# Patient Record
Sex: FEMALE | Race: WHITE | NOT HISPANIC OR LATINO | Employment: OTHER | URBAN - METROPOLITAN AREA
[De-identification: names, ages, dates, MRNs, and addresses within clinical notes are randomized per-mention and may not be internally consistent; named-entity substitution may affect disease eponyms.]

---

## 2018-01-01 ENCOUNTER — HOSPITAL ENCOUNTER (INPATIENT)
Facility: HOSPITAL | Age: 78
LOS: 9 days | DRG: 208 | End: 2018-08-27
Attending: EMERGENCY MEDICINE | Admitting: INTERNAL MEDICINE
Payer: MEDICARE

## 2018-01-01 ENCOUNTER — APPOINTMENT (INPATIENT)
Dept: RADIOLOGY | Facility: HOSPITAL | Age: 78
DRG: 208 | End: 2018-01-01
Payer: MEDICARE

## 2018-01-01 ENCOUNTER — APPOINTMENT (INPATIENT)
Dept: CT IMAGING | Facility: HOSPITAL | Age: 78
DRG: 208 | End: 2018-01-01
Payer: MEDICARE

## 2018-01-01 ENCOUNTER — APPOINTMENT (INPATIENT)
Dept: ULTRASOUND IMAGING | Facility: HOSPITAL | Age: 78
DRG: 208 | End: 2018-01-01
Payer: MEDICARE

## 2018-01-01 ENCOUNTER — APPOINTMENT (EMERGENCY)
Dept: RADIOLOGY | Facility: HOSPITAL | Age: 78
DRG: 208 | End: 2018-01-01
Payer: MEDICARE

## 2018-01-01 ENCOUNTER — APPOINTMENT (INPATIENT)
Dept: NON INVASIVE DIAGNOSTICS | Facility: HOSPITAL | Age: 78
DRG: 208 | End: 2018-01-01
Payer: MEDICARE

## 2018-01-01 VITALS
DIASTOLIC BLOOD PRESSURE: 54 MMHG | OXYGEN SATURATION: 96 % | SYSTOLIC BLOOD PRESSURE: 112 MMHG | RESPIRATION RATE: 24 BRPM | BODY MASS INDEX: 27.59 KG/M2 | TEMPERATURE: 96.4 F | HEART RATE: 81 BPM | WEIGHT: 161.6 LBS | HEIGHT: 64 IN

## 2018-01-01 DIAGNOSIS — R09.02 HYPOXIA: ICD-10-CM

## 2018-01-01 DIAGNOSIS — N17.9 ACUTE RENAL FAILURE (ARF) (HCC): Primary | ICD-10-CM

## 2018-01-01 DIAGNOSIS — I50.43 ACUTE ON CHRONIC COMBINED SYSTOLIC (CONGESTIVE) AND DIASTOLIC (CONGESTIVE) HEART FAILURE (HCC): ICD-10-CM

## 2018-01-01 DIAGNOSIS — R41.82 ALTERED MENTAL STATUS: ICD-10-CM

## 2018-01-01 DIAGNOSIS — J96.01 ACUTE RESPIRATORY FAILURE WITH HYPOXIA (HCC): ICD-10-CM

## 2018-01-01 DIAGNOSIS — R77.8 ELEVATED TROPONIN I LEVEL: ICD-10-CM

## 2018-01-01 DIAGNOSIS — D68.9 COAGULOPATHY (HCC): ICD-10-CM

## 2018-01-01 DIAGNOSIS — J90 PLEURAL EFFUSION: ICD-10-CM

## 2018-01-01 DIAGNOSIS — R79.1 SUPRATHERAPEUTIC INR: ICD-10-CM

## 2018-01-01 DIAGNOSIS — G93.40 ENCEPHALOPATHY: ICD-10-CM

## 2018-01-01 DIAGNOSIS — R79.89 ABNORMAL LFTS: ICD-10-CM

## 2018-01-01 DIAGNOSIS — N17.9 AKI (ACUTE KIDNEY INJURY) (HCC): ICD-10-CM

## 2018-01-01 LAB
A1AT SERPL-MCNC: 207 MG/DL (ref 90–200)
ABO GROUP BLD BPU: NORMAL
ABO GROUP BLD: NORMAL
ACANTHOCYTES BLD QL SMEAR: PRESENT
ACTIN IGG SERPL-ACNC: 8 UNITS (ref 0–19)
ALBUMIN SERPL BCP-MCNC: 2.2 G/DL (ref 3.5–5)
ALBUMIN SERPL BCP-MCNC: 2.3 G/DL (ref 3.5–5)
ALBUMIN SERPL BCP-MCNC: 2.3 G/DL (ref 3.5–5)
ALBUMIN SERPL BCP-MCNC: 2.4 G/DL (ref 3.5–5)
ALBUMIN SERPL BCP-MCNC: 2.5 G/DL (ref 3.5–5)
ALBUMIN SERPL BCP-MCNC: 2.7 G/DL (ref 3.5–5)
ALBUMIN SERPL BCP-MCNC: 2.7 G/DL (ref 3.5–5)
ALP SERPL-CCNC: 145 U/L (ref 46–116)
ALP SERPL-CCNC: 187 U/L (ref 46–116)
ALP SERPL-CCNC: 194 U/L (ref 46–116)
ALP SERPL-CCNC: 197 U/L (ref 46–116)
ALP SERPL-CCNC: 215 U/L (ref 46–116)
ALP SERPL-CCNC: 216 U/L (ref 46–116)
ALP SERPL-CCNC: 221 U/L (ref 46–116)
ALP SERPL-CCNC: 223 U/L (ref 46–116)
ALP SERPL-CCNC: 238 U/L (ref 46–116)
ALT SERPL W P-5'-P-CCNC: 106 U/L (ref 12–78)
ALT SERPL W P-5'-P-CCNC: 111 U/L (ref 12–78)
ALT SERPL W P-5'-P-CCNC: 127 U/L (ref 12–78)
ALT SERPL W P-5'-P-CCNC: 131 U/L (ref 12–78)
ALT SERPL W P-5'-P-CCNC: 161 U/L (ref 12–78)
ALT SERPL W P-5'-P-CCNC: 167 U/L (ref 12–78)
ALT SERPL W P-5'-P-CCNC: 208 U/L (ref 12–78)
ALT SERPL W P-5'-P-CCNC: 413 U/L (ref 12–78)
ALT SERPL W P-5'-P-CCNC: 429 U/L (ref 12–78)
AMMONIA PLAS-SCNC: 11 UMOL/L (ref 11–35)
AMMONIA PLAS-SCNC: 34 UMOL/L (ref 11–35)
ANION GAP BLD CALC-SCNC: 17 MMOL/L (ref 4–13)
ANION GAP SERPL CALCULATED.3IONS-SCNC: 10 MMOL/L (ref 4–13)
ANION GAP SERPL CALCULATED.3IONS-SCNC: 10 MMOL/L (ref 4–13)
ANION GAP SERPL CALCULATED.3IONS-SCNC: 11 MMOL/L (ref 4–13)
ANION GAP SERPL CALCULATED.3IONS-SCNC: 12 MMOL/L (ref 4–13)
ANION GAP SERPL CALCULATED.3IONS-SCNC: 12 MMOL/L (ref 4–13)
ANION GAP SERPL CALCULATED.3IONS-SCNC: 13 MMOL/L (ref 4–13)
ANION GAP SERPL CALCULATED.3IONS-SCNC: 14 MMOL/L (ref 4–13)
ANION GAP SERPL CALCULATED.3IONS-SCNC: 14 MMOL/L (ref 4–13)
ANION GAP SERPL CALCULATED.3IONS-SCNC: 15 MMOL/L (ref 4–13)
ANION GAP SERPL CALCULATED.3IONS-SCNC: 16 MMOL/L (ref 4–13)
ANION GAP SERPL CALCULATED.3IONS-SCNC: 18 MMOL/L (ref 4–13)
ANION GAP SERPL CALCULATED.3IONS-SCNC: 7 MMOL/L (ref 4–13)
ANION GAP SERPL CALCULATED.3IONS-SCNC: 9 MMOL/L (ref 4–13)
ANISOCYTOSIS BLD QL SMEAR: PRESENT
APTT PPP: 45 SECONDS (ref 24–36)
APTT PPP: 47 SECONDS (ref 24–36)
APTT PPP: 47 SECONDS (ref 24–36)
ARTERIAL PATENCY WRIST A: ABNORMAL
ARTERIAL PATENCY WRIST A: ABNORMAL
AST SERPL W P-5'-P-CCNC: 22 U/L (ref 5–45)
AST SERPL W P-5'-P-CCNC: 23 U/L (ref 5–45)
AST SERPL W P-5'-P-CCNC: 265 U/L (ref 5–45)
AST SERPL W P-5'-P-CCNC: 27 U/L (ref 5–45)
AST SERPL W P-5'-P-CCNC: 388 U/L (ref 5–45)
AST SERPL W P-5'-P-CCNC: 40 U/L (ref 5–45)
AST SERPL W P-5'-P-CCNC: 473 U/L (ref 5–45)
AST SERPL W P-5'-P-CCNC: 64 U/L (ref 5–45)
AST SERPL W P-5'-P-CCNC: 72 U/L (ref 5–45)
ATRIAL RATE: 100 BPM
ATRIAL RATE: 119 BPM
ATRIAL RATE: 120 BPM
ATRIAL RATE: 54 BPM
ATRIAL RATE: 58 BPM
ATRIAL RATE: 94 BPM
BACTERIA BLD CULT: NORMAL
BACTERIA UR CULT: ABNORMAL
BACTERIA UR QL AUTO: ABNORMAL /HPF
BACTERIA UR QL AUTO: ABNORMAL /HPF
BASE EX.OXY STD BLDV CALC-SCNC: 74.7 % (ref 60–80)
BASE EXCESS BLDA CALC-SCNC: -15 MMOL/L (ref -2–3)
BASE EXCESS BLDA CALC-SCNC: -4 MMOL/L (ref -2–3)
BASE EXCESS BLDA CALC-SCNC: 0 MMOL/L (ref -2–3)
BASE EXCESS BLDV CALC-SCNC: -13.4 MMOL/L
BASOPHILS # BLD AUTO: 0.01 THOUSANDS/ΜL (ref 0–0.1)
BASOPHILS # BLD MANUAL: 0 THOUSAND/UL (ref 0–0.1)
BASOPHILS NFR BLD AUTO: 0 % (ref 0–1)
BASOPHILS NFR MAR MANUAL: 0 % (ref 0–1)
BILIRUB DIRECT SERPL-MCNC: 0.91 MG/DL (ref 0–0.2)
BILIRUB DIRECT SERPL-MCNC: 1.13 MG/DL (ref 0–0.2)
BILIRUB DIRECT SERPL-MCNC: 1.2 MG/DL (ref 0–0.2)
BILIRUB DIRECT SERPL-MCNC: 1.81 MG/DL (ref 0–0.2)
BILIRUB SERPL-MCNC: 1.2 MG/DL (ref 0.2–1)
BILIRUB SERPL-MCNC: 1.4 MG/DL (ref 0.2–1)
BILIRUB SERPL-MCNC: 1.5 MG/DL (ref 0.2–1)
BILIRUB SERPL-MCNC: 1.6 MG/DL (ref 0.2–1)
BILIRUB SERPL-MCNC: 1.6 MG/DL (ref 0.2–1)
BILIRUB SERPL-MCNC: 1.8 MG/DL (ref 0.2–1)
BILIRUB SERPL-MCNC: 2.4 MG/DL (ref 0.2–1)
BILIRUB SERPL-MCNC: 3.1 MG/DL (ref 0.2–1)
BILIRUB SERPL-MCNC: 3.4 MG/DL (ref 0.2–1)
BILIRUB UR QL STRIP: NEGATIVE
BILIRUB UR QL STRIP: NEGATIVE
BLD GP AB SCN SERPL QL: POSITIVE
BLD SMEAR INTERP: NORMAL
BLOOD GROUP ANTIBODIES SERPL: NORMAL
BLOOD GROUP ANTIBODIES SERPL: NORMAL
BPU ID: NORMAL
BUN BLD-MCNC: 55 MG/DL (ref 5–25)
BUN SERPL-MCNC: 41 MG/DL (ref 5–25)
BUN SERPL-MCNC: 42 MG/DL (ref 5–25)
BUN SERPL-MCNC: 46 MG/DL (ref 5–25)
BUN SERPL-MCNC: 48 MG/DL (ref 5–25)
BUN SERPL-MCNC: 49 MG/DL (ref 5–25)
BUN SERPL-MCNC: 51 MG/DL (ref 5–25)
BUN SERPL-MCNC: 52 MG/DL (ref 5–25)
BUN SERPL-MCNC: 52 MG/DL (ref 5–25)
BUN SERPL-MCNC: 54 MG/DL (ref 5–25)
BUN SERPL-MCNC: 54 MG/DL (ref 5–25)
BUN SERPL-MCNC: 56 MG/DL (ref 5–25)
BUN SERPL-MCNC: 57 MG/DL (ref 5–25)
BUN SERPL-MCNC: 57 MG/DL (ref 5–25)
BUN SERPL-MCNC: 62 MG/DL (ref 5–25)
BUN SERPL-MCNC: 65 MG/DL (ref 5–25)
BUN SERPL-MCNC: 68 MG/DL (ref 5–25)
BUN SERPL-MCNC: 69 MG/DL (ref 5–25)
BUN SERPL-MCNC: 71 MG/DL (ref 5–25)
BUN SERPL-MCNC: 74 MG/DL (ref 5–25)
BURR CELLS BLD QL SMEAR: PRESENT
CA-I BLD-SCNC: 1.04 MMOL/L (ref 1.12–1.32)
CA-I BLD-SCNC: 1.07 MMOL/L (ref 1.12–1.32)
CA-I BLD-SCNC: 1.07 MMOL/L (ref 1.12–1.32)
CA-I BLD-SCNC: 1.11 MMOL/L (ref 1.12–1.32)
CA-I BLD-SCNC: 1.12 MMOL/L (ref 1.12–1.32)
CA-I BLD-SCNC: 1.14 MMOL/L (ref 1.12–1.32)
CA-I BLD-SCNC: 1.14 MMOL/L (ref 1.12–1.32)
CA-I BLD-SCNC: 1.16 MMOL/L (ref 1.12–1.32)
CA-I BLD-SCNC: 1.19 MMOL/L (ref 1.12–1.32)
CALCIUM SERPL-MCNC: 7.8 MG/DL (ref 8.3–10.1)
CALCIUM SERPL-MCNC: 8 MG/DL (ref 8.3–10.1)
CALCIUM SERPL-MCNC: 8.1 MG/DL (ref 8.3–10.1)
CALCIUM SERPL-MCNC: 8.2 MG/DL (ref 8.3–10.1)
CALCIUM SERPL-MCNC: 8.3 MG/DL (ref 8.3–10.1)
CALCIUM SERPL-MCNC: 8.4 MG/DL (ref 8.3–10.1)
CALCIUM SERPL-MCNC: 8.6 MG/DL (ref 8.3–10.1)
CALCIUM SERPL-MCNC: 8.6 MG/DL (ref 8.3–10.1)
CALCIUM SERPL-MCNC: 8.9 MG/DL (ref 8.3–10.1)
CALCIUM SERPL-MCNC: 9 MG/DL (ref 8.3–10.1)
CALCIUM SERPL-MCNC: 9.4 MG/DL (ref 8.3–10.1)
CERULOPLASMIN SERPL-MCNC: 36.3 MG/DL (ref 19–39)
CHLORIDE BLD-SCNC: 110 MMOL/L (ref 100–108)
CHLORIDE SERPL-SCNC: 105 MMOL/L (ref 100–108)
CHLORIDE SERPL-SCNC: 106 MMOL/L (ref 100–108)
CHLORIDE SERPL-SCNC: 107 MMOL/L (ref 100–108)
CHLORIDE SERPL-SCNC: 108 MMOL/L (ref 100–108)
CHLORIDE SERPL-SCNC: 109 MMOL/L (ref 100–108)
CHLORIDE SERPL-SCNC: 110 MMOL/L (ref 100–108)
CHLORIDE SERPL-SCNC: 111 MMOL/L (ref 100–108)
CHLORIDE SERPL-SCNC: 112 MMOL/L (ref 100–108)
CLARITY UR: ABNORMAL
CLARITY UR: ABNORMAL
CO2 SERPL-SCNC: 20 MMOL/L (ref 21–32)
CO2 SERPL-SCNC: 20 MMOL/L (ref 21–32)
CO2 SERPL-SCNC: 22 MMOL/L (ref 21–32)
CO2 SERPL-SCNC: 23 MMOL/L (ref 21–32)
CO2 SERPL-SCNC: 24 MMOL/L (ref 21–32)
CO2 SERPL-SCNC: 24 MMOL/L (ref 21–32)
CO2 SERPL-SCNC: 25 MMOL/L (ref 21–32)
CO2 SERPL-SCNC: 26 MMOL/L (ref 21–32)
CO2 SERPL-SCNC: 26 MMOL/L (ref 21–32)
CO2 SERPL-SCNC: 27 MMOL/L (ref 21–32)
CO2 SERPL-SCNC: 28 MMOL/L (ref 21–32)
COARSE GRAN CASTS URNS QL MICRO: ABNORMAL /LPF
COLOR UR: YELLOW
COLOR UR: YELLOW
CREAT BLD-MCNC: 2.7 MG/DL (ref 0.6–1.3)
CREAT SERPL-MCNC: 2.17 MG/DL (ref 0.6–1.3)
CREAT SERPL-MCNC: 2.34 MG/DL (ref 0.6–1.3)
CREAT SERPL-MCNC: 2.39 MG/DL (ref 0.6–1.3)
CREAT SERPL-MCNC: 2.41 MG/DL (ref 0.6–1.3)
CREAT SERPL-MCNC: 2.54 MG/DL (ref 0.6–1.3)
CREAT SERPL-MCNC: 2.62 MG/DL (ref 0.6–1.3)
CREAT SERPL-MCNC: 2.68 MG/DL (ref 0.6–1.3)
CREAT SERPL-MCNC: 2.69 MG/DL (ref 0.6–1.3)
CREAT SERPL-MCNC: 2.7 MG/DL (ref 0.6–1.3)
CREAT SERPL-MCNC: 2.77 MG/DL (ref 0.6–1.3)
CREAT SERPL-MCNC: 2.79 MG/DL (ref 0.6–1.3)
CREAT SERPL-MCNC: 2.81 MG/DL (ref 0.6–1.3)
CREAT SERPL-MCNC: 2.93 MG/DL (ref 0.6–1.3)
CREAT SERPL-MCNC: 3.27 MG/DL (ref 0.6–1.3)
CREAT SERPL-MCNC: 3.43 MG/DL (ref 0.6–1.3)
CREAT SERPL-MCNC: 3.66 MG/DL (ref 0.6–1.3)
CREAT SERPL-MCNC: 3.67 MG/DL (ref 0.6–1.3)
CREAT SERPL-MCNC: 3.85 MG/DL (ref 0.6–1.3)
CREAT SERPL-MCNC: 3.87 MG/DL (ref 0.6–1.3)
CREAT UR-MCNC: 38.6 MG/DL
CROSSMATCH: NORMAL
CROSSMATCH: NORMAL
DAT POLY-SP REAG RBC QL: NEGATIVE
DEPRECATED AT III PPP: 69 % OF NORMAL (ref 92–136)
DEPRECATED D DIMER PPP: 2222 NG/ML (FEU) (ref 0–424)
DS:DELIVERY SYSTEM: ABNORMAL
DS:DELIVERY SYSTEM: AC
EOSINOPHIL # BLD AUTO: 0 THOUSAND/ΜL (ref 0–0.61)
EOSINOPHIL # BLD AUTO: 0.01 THOUSAND/ΜL (ref 0–0.61)
EOSINOPHIL # BLD AUTO: 0.01 THOUSAND/ΜL (ref 0–0.61)
EOSINOPHIL # BLD AUTO: 0.02 THOUSAND/ΜL (ref 0–0.61)
EOSINOPHIL # BLD MANUAL: 0 THOUSAND/UL (ref 0–0.4)
EOSINOPHIL NFR BLD AUTO: 0 % (ref 0–6)
EOSINOPHIL NFR BLD MANUAL: 0 % (ref 0–6)
ERYTHROCYTE [DISTWIDTH] IN BLOOD BY AUTOMATED COUNT: 20.4 % (ref 11.6–15.1)
ERYTHROCYTE [DISTWIDTH] IN BLOOD BY AUTOMATED COUNT: 20.6 % (ref 11.6–15.1)
ERYTHROCYTE [DISTWIDTH] IN BLOOD BY AUTOMATED COUNT: 20.7 % (ref 11.6–15.1)
ERYTHROCYTE [DISTWIDTH] IN BLOOD BY AUTOMATED COUNT: 20.8 % (ref 11.6–15.1)
ERYTHROCYTE [DISTWIDTH] IN BLOOD BY AUTOMATED COUNT: 20.9 % (ref 11.6–15.1)
ERYTHROCYTE [DISTWIDTH] IN BLOOD BY AUTOMATED COUNT: 21.7 % (ref 11.6–15.1)
ERYTHROCYTE [DISTWIDTH] IN BLOOD BY AUTOMATED COUNT: 21.9 % (ref 11.6–15.1)
ERYTHROCYTE [DISTWIDTH] IN BLOOD BY AUTOMATED COUNT: 22 % (ref 11.6–15.1)
ERYTHROCYTE [DISTWIDTH] IN BLOOD BY AUTOMATED COUNT: 22.2 % (ref 11.6–15.1)
ERYTHROCYTE [SEDIMENTATION RATE] IN BLOOD: 5 MM/HOUR (ref 0–20)
EST. AVERAGE GLUCOSE BLD GHB EST-MCNC: 137 MG/DL
FDP BLD QL AGGL: <10
FERRITIN SERPL-MCNC: 35 NG/ML (ref 8–388)
FIBRINOGEN PPP-MCNC: 169 MG/DL (ref 227–495)
FIBRINOGEN PPP-MCNC: 307 MG/DL (ref 227–495)
FINE GRAN CASTS URNS QL MICRO: ABNORMAL /LPF
FINE GRAN CASTS URNS QL MICRO: ABNORMAL /LPF
FIO2 GAS DIL.REBREATH: 100 L
FIO2 GAS DIL.REBREATH: 28 L
FIO2 GAS DIL.REBREATH: 44 L
GFR SERPL CREATININE-BSD FRML MDRD: 11 ML/MIN/1.73SQ M
GFR SERPL CREATININE-BSD FRML MDRD: 12 ML/MIN/1.73SQ M
GFR SERPL CREATININE-BSD FRML MDRD: 13 ML/MIN/1.73SQ M
GFR SERPL CREATININE-BSD FRML MDRD: 15 ML/MIN/1.73SQ M
GFR SERPL CREATININE-BSD FRML MDRD: 16 ML/MIN/1.73SQ M
GFR SERPL CREATININE-BSD FRML MDRD: 17 ML/MIN/1.73SQ M
GFR SERPL CREATININE-BSD FRML MDRD: 18 ML/MIN/1.73SQ M
GFR SERPL CREATININE-BSD FRML MDRD: 19 ML/MIN/1.73SQ M
GFR SERPL CREATININE-BSD FRML MDRD: 19 ML/MIN/1.73SQ M
GFR SERPL CREATININE-BSD FRML MDRD: 20 ML/MIN/1.73SQ M
GFR SERPL CREATININE-BSD FRML MDRD: 22 ML/MIN/1.73SQ M
GLUCOSE SERPL-MCNC: 103 MG/DL (ref 65–140)
GLUCOSE SERPL-MCNC: 104 MG/DL (ref 65–140)
GLUCOSE SERPL-MCNC: 106 MG/DL (ref 65–140)
GLUCOSE SERPL-MCNC: 108 MG/DL (ref 65–140)
GLUCOSE SERPL-MCNC: 108 MG/DL (ref 65–140)
GLUCOSE SERPL-MCNC: 110 MG/DL (ref 65–140)
GLUCOSE SERPL-MCNC: 114 MG/DL (ref 65–140)
GLUCOSE SERPL-MCNC: 118 MG/DL (ref 65–140)
GLUCOSE SERPL-MCNC: 119 MG/DL (ref 65–140)
GLUCOSE SERPL-MCNC: 122 MG/DL (ref 65–140)
GLUCOSE SERPL-MCNC: 125 MG/DL (ref 65–140)
GLUCOSE SERPL-MCNC: 129 MG/DL (ref 65–140)
GLUCOSE SERPL-MCNC: 129 MG/DL (ref 65–140)
GLUCOSE SERPL-MCNC: 132 MG/DL (ref 65–140)
GLUCOSE SERPL-MCNC: 135 MG/DL (ref 65–140)
GLUCOSE SERPL-MCNC: 136 MG/DL (ref 65–140)
GLUCOSE SERPL-MCNC: 139 MG/DL (ref 65–140)
GLUCOSE SERPL-MCNC: 141 MG/DL (ref 65–140)
GLUCOSE SERPL-MCNC: 141 MG/DL (ref 65–140)
GLUCOSE SERPL-MCNC: 146 MG/DL (ref 65–140)
GLUCOSE SERPL-MCNC: 148 MG/DL (ref 65–140)
GLUCOSE SERPL-MCNC: 148 MG/DL (ref 65–140)
GLUCOSE SERPL-MCNC: 149 MG/DL (ref 65–140)
GLUCOSE SERPL-MCNC: 153 MG/DL (ref 65–140)
GLUCOSE SERPL-MCNC: 154 MG/DL (ref 65–140)
GLUCOSE SERPL-MCNC: 155 MG/DL (ref 65–140)
GLUCOSE SERPL-MCNC: 157 MG/DL (ref 65–140)
GLUCOSE SERPL-MCNC: 160 MG/DL (ref 65–140)
GLUCOSE SERPL-MCNC: 160 MG/DL (ref 65–140)
GLUCOSE SERPL-MCNC: 162 MG/DL (ref 65–140)
GLUCOSE SERPL-MCNC: 178 MG/DL (ref 65–140)
GLUCOSE SERPL-MCNC: 187 MG/DL (ref 65–140)
GLUCOSE SERPL-MCNC: 187 MG/DL (ref 65–140)
GLUCOSE SERPL-MCNC: 248 MG/DL (ref 65–140)
GLUCOSE SERPL-MCNC: 43 MG/DL (ref 65–140)
GLUCOSE SERPL-MCNC: 431 MG/DL (ref 65–140)
GLUCOSE SERPL-MCNC: 63 MG/DL (ref 65–140)
GLUCOSE SERPL-MCNC: 79 MG/DL (ref 65–140)
GLUCOSE SERPL-MCNC: 89 MG/DL (ref 65–140)
GLUCOSE SERPL-MCNC: 94 MG/DL (ref 65–140)
GLUCOSE SERPL-MCNC: 97 MG/DL (ref 65–140)
GLUCOSE UR STRIP-MCNC: NEGATIVE MG/DL
GLUCOSE UR STRIP-MCNC: NEGATIVE MG/DL
HAV IGM SER QL: NORMAL
HBA1C MFR BLD: 6.4 % (ref 4.2–6.3)
HBV CORE AB SER QL: REACTIVE
HBV CORE IGM SER QL: ABNORMAL
HBV CORE IGM SER QL: NORMAL
HBV SURFACE AG SER QL: ABNORMAL
HBV SURFACE AG SER QL: NORMAL
HCO3 BLDA-SCNC: 15.2 MMOL/L (ref 22–28)
HCO3 BLDA-SCNC: 21 MMOL/L (ref 22–28)
HCO3 BLDA-SCNC: 25.3 MMOL/L (ref 22–28)
HCO3 BLDV-SCNC: 13.9 MMOL/L (ref 24–30)
HCT VFR BLD AUTO: 21.8 % (ref 34.8–46.1)
HCT VFR BLD AUTO: 21.8 % (ref 34.8–46.1)
HCT VFR BLD AUTO: 25 % (ref 34.8–46.1)
HCT VFR BLD AUTO: 27.4 % (ref 34.8–46.1)
HCT VFR BLD AUTO: 27.9 % (ref 34.8–46.1)
HCT VFR BLD AUTO: 28.4 % (ref 34.8–46.1)
HCT VFR BLD AUTO: 28.6 % (ref 34.8–46.1)
HCT VFR BLD AUTO: 29.8 % (ref 34.8–46.1)
HCT VFR BLD AUTO: 30.2 % (ref 34.8–46.1)
HCT VFR BLD CALC: 21 % (ref 34.8–46.1)
HCT VFR BLD CALC: 22 % (ref 34.8–46.1)
HCT VFR BLD CALC: 27 % (ref 34.8–46.1)
HCT VFR BLD CALC: 30 % (ref 34.8–46.1)
HCV AB SER QL: ABNORMAL
HCV AB SER QL: NORMAL
HCYS SERPL-SCNC: 13.1 UMOL/L (ref 3.7–11.2)
HGB BLD-MCNC: 6.2 G/DL (ref 11.5–15.4)
HGB BLD-MCNC: 6.2 G/DL (ref 11.5–15.4)
HGB BLD-MCNC: 6.3 G/DL (ref 11.5–15.4)
HGB BLD-MCNC: 7.1 G/DL (ref 11.5–15.4)
HGB BLD-MCNC: 7.6 G/DL (ref 11.5–15.4)
HGB BLD-MCNC: 7.8 G/DL (ref 11.5–15.4)
HGB BLD-MCNC: 8 G/DL (ref 11.5–15.4)
HGB BLD-MCNC: 8.1 G/DL (ref 11.5–15.4)
HGB BLD-MCNC: 8.2 G/DL (ref 11.5–15.4)
HGB BLD-MCNC: 8.3 G/DL (ref 11.5–15.4)
HGB BLD-MCNC: 8.4 G/DL (ref 11.5–15.4)
HGB BLD-MCNC: 8.4 G/DL (ref 11.5–15.4)
HGB BLD-MCNC: 8.9 G/DL (ref 11.5–15.4)
HGB BLD-MCNC: 9 G/DL (ref 11.5–15.4)
HGB BLD-MCNC: 9.2 G/DL (ref 11.5–15.4)
HGB BLDA-MCNC: 10.2 G/DL (ref 11.5–15.4)
HGB BLDA-MCNC: 7.1 G/DL (ref 11.5–15.4)
HGB BLDA-MCNC: 7.5 G/DL (ref 11.5–15.4)
HGB BLDA-MCNC: 9.2 G/DL (ref 11.5–15.4)
HGB UR QL STRIP.AUTO: ABNORMAL
HGB UR QL STRIP.AUTO: ABNORMAL
HYALINE CASTS #/AREA URNS LPF: ABNORMAL /LPF
IMM GRANULOCYTES # BLD AUTO: 0.05 THOUSAND/UL (ref 0–0.2)
IMM GRANULOCYTES # BLD AUTO: 0.06 THOUSAND/UL (ref 0–0.2)
IMM GRANULOCYTES # BLD AUTO: 0.07 THOUSAND/UL (ref 0–0.2)
IMM GRANULOCYTES # BLD AUTO: 0.08 THOUSAND/UL (ref 0–0.2)
IMM GRANULOCYTES # BLD AUTO: 0.08 THOUSAND/UL (ref 0–0.2)
IMM GRANULOCYTES # BLD AUTO: 0.12 THOUSAND/UL (ref 0–0.2)
IMM GRANULOCYTES # BLD AUTO: 0.24 THOUSAND/UL (ref 0–0.2)
IMM GRANULOCYTES NFR BLD AUTO: 1 % (ref 0–2)
IMM GRANULOCYTES NFR BLD AUTO: 2 % (ref 0–2)
INR PPP: 2.14 (ref 0.86–1.17)
INR PPP: 2.52 (ref 0.86–1.17)
INR PPP: 2.54 (ref 0.86–1.17)
INR PPP: 2.82 (ref 0.86–1.17)
INR PPP: 3 (ref 0.86–1.17)
INR PPP: 3.81 (ref 0.86–1.17)
INR PPP: 5.65 (ref 0.86–1.17)
IRON SATN MFR SERPL: 4 %
IRON SERPL-MCNC: 15 UG/DL (ref 50–170)
KETONES UR STRIP-MCNC: NEGATIVE MG/DL
KETONES UR STRIP-MCNC: NEGATIVE MG/DL
LACTATE SERPL-SCNC: 2 MMOL/L (ref 0.5–2)
LACTATE SERPL-SCNC: 2.2 MMOL/L (ref 0.5–2)
LACTATE SERPL-SCNC: 2.6 MMOL/L (ref 0.5–2)
LDH SERPL-CCNC: 277 U/L (ref 81–234)
LEUKOCYTE ESTERASE UR QL STRIP: ABNORMAL
LEUKOCYTE ESTERASE UR QL STRIP: ABNORMAL
LYMPHOCYTES # BLD AUTO: 0.85 THOUSANDS/ΜL (ref 0.6–4.47)
LYMPHOCYTES # BLD AUTO: 0.91 THOUSANDS/ΜL (ref 0.6–4.47)
LYMPHOCYTES # BLD AUTO: 1.01 THOUSANDS/ΜL (ref 0.6–4.47)
LYMPHOCYTES # BLD AUTO: 1.19 THOUSANDS/ΜL (ref 0.6–4.47)
LYMPHOCYTES # BLD AUTO: 1.32 THOUSANDS/ΜL (ref 0.6–4.47)
LYMPHOCYTES # BLD AUTO: 1.61 THOUSANDS/ΜL (ref 0.6–4.47)
LYMPHOCYTES # BLD AUTO: 1.72 THOUSAND/UL (ref 0.6–4.47)
LYMPHOCYTES # BLD AUTO: 1.72 THOUSANDS/ΜL (ref 0.6–4.47)
LYMPHOCYTES # BLD AUTO: 12 % (ref 14–44)
LYMPHOCYTES NFR BLD AUTO: 12 % (ref 14–44)
LYMPHOCYTES NFR BLD AUTO: 13 % (ref 14–44)
LYMPHOCYTES NFR BLD AUTO: 17 % (ref 14–44)
LYMPHOCYTES NFR BLD AUTO: 17 % (ref 14–44)
LYMPHOCYTES NFR BLD AUTO: 5 % (ref 14–44)
LYMPHOCYTES NFR BLD AUTO: 9 % (ref 14–44)
LYMPHOCYTES NFR BLD AUTO: 9 % (ref 14–44)
MAGNESIUM SERPL-MCNC: 2 MG/DL (ref 1.6–2.6)
MAGNESIUM SERPL-MCNC: 2.1 MG/DL (ref 1.6–2.6)
MAGNESIUM SERPL-MCNC: 2.2 MG/DL (ref 1.6–2.6)
MAGNESIUM SERPL-MCNC: 2.3 MG/DL (ref 1.6–2.6)
MAGNESIUM SERPL-MCNC: 2.4 MG/DL (ref 1.6–2.6)
MCH RBC QN AUTO: 24 PG (ref 26.8–34.3)
MCH RBC QN AUTO: 24.1 PG (ref 26.8–34.3)
MCH RBC QN AUTO: 24.2 PG (ref 26.8–34.3)
MCH RBC QN AUTO: 24.7 PG (ref 26.8–34.3)
MCH RBC QN AUTO: 24.9 PG (ref 26.8–34.3)
MCH RBC QN AUTO: 24.9 PG (ref 26.8–34.3)
MCH RBC QN AUTO: 26 PG (ref 26.8–34.3)
MCH RBC QN AUTO: 26.5 PG (ref 26.8–34.3)
MCH RBC QN AUTO: 27.2 PG (ref 26.8–34.3)
MCHC RBC AUTO-ENTMCNC: 28 G/DL (ref 31.4–37.4)
MCHC RBC AUTO-ENTMCNC: 28.2 G/DL (ref 31.4–37.4)
MCHC RBC AUTO-ENTMCNC: 28.4 G/DL (ref 31.4–37.4)
MCHC RBC AUTO-ENTMCNC: 28.5 G/DL (ref 31.4–37.4)
MCHC RBC AUTO-ENTMCNC: 28.7 G/DL (ref 31.4–37.4)
MCHC RBC AUTO-ENTMCNC: 29.2 G/DL (ref 31.4–37.4)
MCHC RBC AUTO-ENTMCNC: 29.8 G/DL (ref 31.4–37.4)
MCV RBC AUTO: 85 FL (ref 82–98)
MCV RBC AUTO: 85 FL (ref 82–98)
MCV RBC AUTO: 86 FL (ref 82–98)
MCV RBC AUTO: 87 FL (ref 82–98)
MCV RBC AUTO: 88 FL (ref 82–98)
MCV RBC AUTO: 88 FL (ref 82–98)
MCV RBC AUTO: 89 FL (ref 82–98)
MCV RBC AUTO: 89 FL (ref 82–98)
MCV RBC AUTO: 96 FL (ref 82–98)
METAMYELOCYTES NFR BLD MANUAL: 2 % (ref 0–1)
MITOCHONDRIA M2 IGG SER-ACNC: 6 UNITS (ref 0–20)
MONOCYTES # BLD AUTO: 0.69 THOUSAND/ΜL (ref 0.17–1.22)
MONOCYTES # BLD AUTO: 0.69 THOUSAND/ΜL (ref 0.17–1.22)
MONOCYTES # BLD AUTO: 0.79 THOUSAND/ΜL (ref 0.17–1.22)
MONOCYTES # BLD AUTO: 0.89 THOUSAND/ΜL (ref 0.17–1.22)
MONOCYTES # BLD AUTO: 1.01 THOUSAND/ΜL (ref 0.17–1.22)
MONOCYTES # BLD AUTO: 1.02 THOUSAND/ΜL (ref 0.17–1.22)
MONOCYTES # BLD AUTO: 1.29 THOUSAND/UL (ref 0–1.22)
MONOCYTES # BLD AUTO: 1.49 THOUSAND/ΜL (ref 0.17–1.22)
MONOCYTES NFR BLD AUTO: 10 % (ref 4–12)
MONOCYTES NFR BLD AUTO: 11 % (ref 4–12)
MONOCYTES NFR BLD AUTO: 6 % (ref 4–12)
MONOCYTES NFR BLD AUTO: 8 % (ref 4–12)
MONOCYTES NFR BLD AUTO: 9 % (ref 4–12)
MONOCYTES NFR BLD: 9 % (ref 4–12)
MUCOUS THREADS UR QL AUTO: ABNORMAL
NASAL CANNULA: 2
NEUTROPHILS # BLD AUTO: 13.3 THOUSANDS/ΜL (ref 1.85–7.62)
NEUTROPHILS # BLD AUTO: 6.9 THOUSANDS/ΜL (ref 1.85–7.62)
NEUTROPHILS # BLD AUTO: 7.18 THOUSANDS/ΜL (ref 1.85–7.62)
NEUTROPHILS # BLD AUTO: 7.31 THOUSANDS/ΜL (ref 1.85–7.62)
NEUTROPHILS # BLD AUTO: 7.89 THOUSANDS/ΜL (ref 1.85–7.62)
NEUTROPHILS # BLD AUTO: 8.57 THOUSANDS/ΜL (ref 1.85–7.62)
NEUTROPHILS # BLD AUTO: 9.03 THOUSANDS/ΜL (ref 1.85–7.62)
NEUTROPHILS # BLD MANUAL: 11.03 THOUSAND/UL (ref 1.85–7.62)
NEUTS BAND NFR BLD MANUAL: 1 % (ref 0–8)
NEUTS SEG NFR BLD AUTO: 71 % (ref 43–75)
NEUTS SEG NFR BLD AUTO: 72 % (ref 43–75)
NEUTS SEG NFR BLD AUTO: 76 % (ref 43–75)
NEUTS SEG NFR BLD AUTO: 78 % (ref 43–75)
NEUTS SEG NFR BLD AUTO: 79 % (ref 43–75)
NEUTS SEG NFR BLD AUTO: 82 % (ref 43–75)
NEUTS SEG NFR BLD AUTO: 84 % (ref 43–75)
NEUTS SEG NFR BLD AUTO: 84 % (ref 43–75)
NITRITE UR QL STRIP: NEGATIVE
NITRITE UR QL STRIP: NEGATIVE
NON-SQ EPI CELLS URNS QL MICRO: ABNORMAL /HPF
NON-SQ EPI CELLS URNS QL MICRO: ABNORMAL /HPF
NRBC BLD AUTO-RTO: 1 /100 WBCS
NRBC BLD AUTO-RTO: 2 /100 WBCS
NRBC BLD AUTO-RTO: 4 /100 WBCS
NRBC BLD AUTO-RTO: 7 /100 WBCS
NRBC BLD AUTO-RTO: 9 /100 WBC (ref 0–2)
NT-PROBNP SERPL-MCNC: ABNORMAL PG/ML
O2 CT BLDV-SCNC: 9.7 ML/DL
P AXIS: 25 DEGREES
PCO2 BLD: 17 MMOL/L (ref 21–32)
PCO2 BLD: 21 MMOL/L (ref 21–32)
PCO2 BLD: 22 MMOL/L (ref 21–32)
PCO2 BLD: 27 MMOL/L (ref 21–32)
PCO2 BLD: 37.6 MM HG (ref 36–44)
PCO2 BLD: 46.1 MM HG (ref 36–44)
PCO2 BLD: 55.9 MM HG (ref 36–44)
PCO2 BLDV: 37.9 MM HG (ref 42–50)
PH BLD: 7.04 [PH] (ref 7.35–7.45)
PH BLD: 7.35 [PH] (ref 7.35–7.45)
PH BLD: 7.36 [PH] (ref 7.35–7.45)
PH BLDV: 7.18 [PH] (ref 7.3–7.4)
PH UR STRIP.AUTO: 5 [PH] (ref 4.5–8)
PH UR STRIP.AUTO: 5 [PH] (ref 4.5–8)
PHOSPHATE SERPL-MCNC: 4 MG/DL (ref 2.3–4.1)
PHOSPHATE SERPL-MCNC: 4.4 MG/DL (ref 2.3–4.1)
PHOSPHATE SERPL-MCNC: 4.6 MG/DL (ref 2.3–4.1)
PHOSPHATE SERPL-MCNC: 4.8 MG/DL (ref 2.3–4.1)
PHOSPHATE SERPL-MCNC: 5.1 MG/DL (ref 2.3–4.1)
PHOSPHATE SERPL-MCNC: 6.2 MG/DL (ref 2.3–4.1)
PHOSPHATE SERPL-MCNC: 6.9 MG/DL (ref 2.3–4.1)
PLATELET # BLD AUTO: 101 THOUSANDS/UL (ref 149–390)
PLATELET # BLD AUTO: 101 THOUSANDS/UL (ref 149–390)
PLATELET # BLD AUTO: 108 THOUSANDS/UL (ref 149–390)
PLATELET # BLD AUTO: 112 THOUSANDS/UL (ref 149–390)
PLATELET # BLD AUTO: 149 THOUSANDS/UL (ref 149–390)
PLATELET # BLD AUTO: 154 THOUSANDS/UL (ref 149–390)
PLATELET # BLD AUTO: 230 THOUSANDS/UL (ref 149–390)
PLATELET # BLD AUTO: 291 THOUSANDS/UL (ref 149–390)
PLATELET # BLD AUTO: 33 THOUSANDS/UL (ref 149–390)
PLATELET # BLD AUTO: 60 THOUSANDS/UL (ref 149–390)
PLATELET BLD QL SMEAR: ABNORMAL
PMV BLD AUTO: 10.3 FL (ref 8.9–12.7)
PMV BLD AUTO: 10.3 FL (ref 8.9–12.7)
PMV BLD AUTO: 10.4 FL (ref 8.9–12.7)
PMV BLD AUTO: 10.6 FL (ref 8.9–12.7)
PMV BLD AUTO: 10.7 FL (ref 8.9–12.7)
PMV BLD AUTO: 10.7 FL (ref 8.9–12.7)
PMV BLD AUTO: 10.8 FL (ref 8.9–12.7)
PMV BLD AUTO: 11 FL (ref 8.9–12.7)
PMV BLD AUTO: 11.4 FL (ref 8.9–12.7)
PO2 BLD: 108 MM HG (ref 75–129)
PO2 BLD: 114 MM HG (ref 75–129)
PO2 BLD: 194 MM HG (ref 75–129)
PO2 BLDV: 49.2 MM HG (ref 35–45)
POIKILOCYTOSIS BLD QL SMEAR: PRESENT
POLYCHROMASIA BLD QL SMEAR: PRESENT
POTASSIUM BLD-SCNC: 4.1 MMOL/L (ref 3.5–5.3)
POTASSIUM BLD-SCNC: 4.9 MMOL/L (ref 3.5–5.3)
POTASSIUM BLD-SCNC: 5 MMOL/L (ref 3.5–5.3)
POTASSIUM BLD-SCNC: 5.8 MMOL/L (ref 3.5–5.3)
POTASSIUM SERPL-SCNC: 3.6 MMOL/L (ref 3.5–5.3)
POTASSIUM SERPL-SCNC: 3.7 MMOL/L (ref 3.5–5.3)
POTASSIUM SERPL-SCNC: 3.8 MMOL/L (ref 3.5–5.3)
POTASSIUM SERPL-SCNC: 3.9 MMOL/L (ref 3.5–5.3)
POTASSIUM SERPL-SCNC: 4.1 MMOL/L (ref 3.5–5.3)
POTASSIUM SERPL-SCNC: 4.1 MMOL/L (ref 3.5–5.3)
POTASSIUM SERPL-SCNC: 4.4 MMOL/L (ref 3.5–5.3)
POTASSIUM SERPL-SCNC: 4.5 MMOL/L (ref 3.5–5.3)
POTASSIUM SERPL-SCNC: 4.6 MMOL/L (ref 3.5–5.3)
POTASSIUM SERPL-SCNC: 4.7 MMOL/L (ref 3.5–5.3)
POTASSIUM SERPL-SCNC: 4.9 MMOL/L (ref 3.5–5.3)
POTASSIUM SERPL-SCNC: 4.9 MMOL/L (ref 3.5–5.3)
POTASSIUM SERPL-SCNC: 5.3 MMOL/L (ref 3.5–5.3)
POTASSIUM SERPL-SCNC: 6 MMOL/L (ref 3.5–5.3)
PR INTERVAL: 156 MS
PROCALCITONIN SERPL-MCNC: 0.47 NG/ML
PROCALCITONIN SERPL-MCNC: 0.82 NG/ML
PROT SERPL-MCNC: 4.7 G/DL (ref 6.4–8.2)
PROT SERPL-MCNC: 5.2 G/DL (ref 6.4–8.2)
PROT SERPL-MCNC: 5.3 G/DL (ref 6.4–8.2)
PROT SERPL-MCNC: 5.5 G/DL (ref 6.4–8.2)
PROT SERPL-MCNC: 5.7 G/DL (ref 6.4–8.2)
PROT SERPL-MCNC: 5.8 G/DL (ref 6.4–8.2)
PROT SERPL-MCNC: 6 G/DL (ref 6.4–8.2)
PROT UR STRIP-MCNC: ABNORMAL MG/DL
PROT UR STRIP-MCNC: NEGATIVE MG/DL
PROT UR-MCNC: 31 MG/DL
PROT/CREAT UR: 0.8 MG/G{CREAT} (ref 0–0.1)
PROTHROMBIN TIME: 23.3 SECONDS (ref 11.8–14.2)
PROTHROMBIN TIME: 26.4 SECONDS (ref 11.8–14.2)
PROTHROMBIN TIME: 26.6 SECONDS (ref 11.8–14.2)
PROTHROMBIN TIME: 28.8 SECONDS (ref 11.8–14.2)
PROTHROMBIN TIME: 30.2 SECONDS (ref 11.8–14.2)
PROTHROMBIN TIME: 36.4 SECONDS (ref 11.8–14.2)
PROTHROMBIN TIME: 49.4 SECONDS (ref 11.8–14.2)
QRS AXIS: -46 DEGREES
QRS AXIS: -50 DEGREES
QRS AXIS: -55 DEGREES
QRS AXIS: -56 DEGREES
QRS AXIS: 159 DEGREES
QRS AXIS: 270 DEGREES
QRSD INTERVAL: 102 MS
QRSD INTERVAL: 116 MS
QRSD INTERVAL: 116 MS
QRSD INTERVAL: 126 MS
QRSD INTERVAL: 132 MS
QRSD INTERVAL: 164 MS
QT INTERVAL: 342 MS
QT INTERVAL: 350 MS
QT INTERVAL: 360 MS
QT INTERVAL: 370 MS
QT INTERVAL: 432 MS
QT INTERVAL: 536 MS
QTC INTERVAL: 417 MS
QTC INTERVAL: 445 MS
QTC INTERVAL: 464 MS
QTC INTERVAL: 492 MS
QTC INTERVAL: 508 MS
QTC INTERVAL: 511 MS
RBC # BLD AUTO: 2.49 MILLION/UL (ref 3.81–5.12)
RBC # BLD AUTO: 2.49 MILLION/UL (ref 3.81–5.12)
RBC # BLD AUTO: 2.61 MILLION/UL (ref 3.81–5.12)
RBC # BLD AUTO: 3.08 MILLION/UL (ref 3.81–5.12)
RBC # BLD AUTO: 3.22 MILLION/UL (ref 3.81–5.12)
RBC # BLD AUTO: 3.32 MILLION/UL (ref 3.81–5.12)
RBC # BLD AUTO: 3.36 MILLION/UL (ref 3.81–5.12)
RBC # BLD AUTO: 3.4 MILLION/UL (ref 3.81–5.12)
RBC # BLD AUTO: 3.5 MILLION/UL (ref 3.81–5.12)
RBC #/AREA URNS AUTO: ABNORMAL /HPF
RBC #/AREA URNS AUTO: ABNORMAL /HPF
RESPIRATORY RATE: 15
RH BLD: NEGATIVE
RHEUMATOID FACT SER QL LA: NEGATIVE
RYE IGE QN: NEGATIVE
SAMPLE SITE: ABNORMAL
SAMPLE SITE: ABNORMAL
SAO2 % BLD FROM PO2: 100 % (ref 95–98)
SAO2 % BLD FROM PO2: 95 % (ref 95–98)
SAO2 % BLD FROM PO2: 98 % (ref 95–98)
SODIUM BLD-SCNC: 141 MMOL/L (ref 136–145)
SODIUM BLD-SCNC: 142 MMOL/L (ref 136–145)
SODIUM BLD-SCNC: 143 MMOL/L (ref 136–145)
SODIUM BLD-SCNC: 144 MMOL/L (ref 136–145)
SODIUM SERPL-SCNC: 141 MMOL/L (ref 136–145)
SODIUM SERPL-SCNC: 142 MMOL/L (ref 136–145)
SODIUM SERPL-SCNC: 143 MMOL/L (ref 136–145)
SODIUM SERPL-SCNC: 144 MMOL/L (ref 136–145)
SODIUM SERPL-SCNC: 145 MMOL/L (ref 136–145)
SODIUM SERPL-SCNC: 146 MMOL/L (ref 136–145)
SP GR UR STRIP.AUTO: 1.01 (ref 1–1.03)
SP GR UR STRIP.AUTO: 1.02 (ref 1–1.03)
SPECIMEN EXPIRATION DATE: NORMAL
SPECIMEN SOURCE: ABNORMAL
T WAVE AXIS: -26 DEGREES
T WAVE AXIS: -77 DEGREES
T WAVE AXIS: 132 DEGREES
T WAVE AXIS: 136 DEGREES
T WAVE AXIS: 16 DEGREES
T WAVE AXIS: 183 DEGREES
T4 FREE SERPL-MCNC: 1.29 NG/DL (ref 0.76–1.46)
TIBC SERPL-MCNC: 407 UG/DL (ref 250–450)
TOTAL CELLS COUNTED SPEC: 100
TPA PPP QL CHRO: 51 % OF NORMAL (ref 77–138)
TROPONIN I SERPL-MCNC: 0.38 NG/ML
TROPONIN I SERPL-MCNC: 0.38 NG/ML
TROPONIN I SERPL-MCNC: 0.39 NG/ML
TROPONIN I SERPL-MCNC: 0.5 NG/ML
TROPONIN I SERPL-MCNC: 0.77 NG/ML
TROPONIN I SERPL-MCNC: 0.96 NG/ML
TROPONIN I SERPL-MCNC: 1.04 NG/ML
TROPONIN I SERPL-MCNC: 1.12 NG/ML
TSH SERPL DL<=0.05 MIU/L-ACNC: 5.48 UIU/ML (ref 0.36–3.74)
UNIT DISPENSE STATUS: NORMAL
UNIT PRODUCT CODE: NORMAL
UNIT RH: NORMAL
UROBILINOGEN UR QL STRIP.AUTO: 0.2 E.U./DL
UROBILINOGEN UR QL STRIP.AUTO: 0.2 E.U./DL
VENTRICULAR RATE: 100 BPM
VENTRICULAR RATE: 102 BPM
VENTRICULAR RATE: 115 BPM
VENTRICULAR RATE: 119 BPM
VENTRICULAR RATE: 54 BPM
VENTRICULAR RATE: 56 BPM
VIT B12 SERPL-MCNC: 3857 PG/ML (ref 100–900)
WBC # BLD AUTO: 10.15 THOUSAND/UL (ref 4.31–10.16)
WBC # BLD AUTO: 10.39 THOUSAND/UL (ref 4.31–10.16)
WBC # BLD AUTO: 10.82 THOUSAND/UL (ref 4.31–10.16)
WBC # BLD AUTO: 10.84 THOUSAND/UL (ref 4.31–10.16)
WBC # BLD AUTO: 14.33 THOUSAND/UL (ref 4.31–10.16)
WBC # BLD AUTO: 15.89 THOUSAND/UL (ref 4.31–10.16)
WBC # BLD AUTO: 9.21 THOUSAND/UL (ref 4.31–10.16)
WBC # BLD AUTO: 9.61 THOUSAND/UL (ref 4.31–10.16)
WBC # BLD AUTO: 9.66 THOUSAND/UL (ref 4.31–10.16)
WBC #/AREA URNS AUTO: ABNORMAL /HPF
WBC #/AREA URNS AUTO: ABNORMAL /HPF

## 2018-01-01 PROCEDURE — 31500 INSERT EMERGENCY AIRWAY: CPT | Performed by: PHYSICIAN ASSISTANT

## 2018-01-01 PROCEDURE — 99292 CRITICAL CARE ADDL 30 MIN: CPT | Performed by: PHYSICIAN ASSISTANT

## 2018-01-01 PROCEDURE — 93005 ELECTROCARDIOGRAM TRACING: CPT

## 2018-01-01 PROCEDURE — 84295 ASSAY OF SERUM SODIUM: CPT

## 2018-01-01 PROCEDURE — P9016 RBC LEUKOCYTES REDUCED: HCPCS

## 2018-01-01 PROCEDURE — 93971 EXTREMITY STUDY: CPT

## 2018-01-01 PROCEDURE — 99233 SBSQ HOSP IP/OBS HIGH 50: CPT | Performed by: INTERNAL MEDICINE

## 2018-01-01 PROCEDURE — 80048 BASIC METABOLIC PNL TOTAL CA: CPT | Performed by: EMERGENCY MEDICINE

## 2018-01-01 PROCEDURE — 99238 HOSP IP/OBS DSCHRG MGMT 30/<: CPT | Performed by: PHYSICIAN ASSISTANT

## 2018-01-01 PROCEDURE — 94760 N-INVAS EAR/PLS OXIMETRY 1: CPT

## 2018-01-01 PROCEDURE — 97167 OT EVAL HIGH COMPLEX 60 MIN: CPT

## 2018-01-01 PROCEDURE — 85384 FIBRINOGEN ACTIVITY: CPT | Performed by: PHYSICIAN ASSISTANT

## 2018-01-01 PROCEDURE — 87186 SC STD MICRODIL/AGAR DIL: CPT | Performed by: EMERGENCY MEDICINE

## 2018-01-01 PROCEDURE — 86880 COOMBS TEST DIRECT: CPT | Performed by: INTERNAL MEDICINE

## 2018-01-01 PROCEDURE — 82948 REAGENT STRIP/BLOOD GLUCOSE: CPT

## 2018-01-01 PROCEDURE — 30233R1 TRANSFUSION OF NONAUTOLOGOUS PLATELETS INTO PERIPHERAL VEIN, PERCUTANEOUS APPROACH: ICD-10-PCS | Performed by: INTERNAL MEDICINE

## 2018-01-01 PROCEDURE — 74176 CT ABD & PELVIS W/O CONTRAST: CPT

## 2018-01-01 PROCEDURE — G8997 SWALLOW GOAL STATUS: HCPCS

## 2018-01-01 PROCEDURE — 86900 BLOOD TYPING SEROLOGIC ABO: CPT | Performed by: PHYSICIAN ASSISTANT

## 2018-01-01 PROCEDURE — 93010 ELECTROCARDIOGRAM REPORT: CPT | Performed by: INTERNAL MEDICINE

## 2018-01-01 PROCEDURE — 71045 X-RAY EXAM CHEST 1 VIEW: CPT

## 2018-01-01 PROCEDURE — 82728 ASSAY OF FERRITIN: CPT | Performed by: INTERNAL MEDICINE

## 2018-01-01 PROCEDURE — 0BH18EZ INSERTION OF ENDOTRACHEAL AIRWAY INTO TRACHEA, VIA NATURAL OR ARTIFICIAL OPENING ENDOSCOPIC: ICD-10-PCS | Performed by: INTERNAL MEDICINE

## 2018-01-01 PROCEDURE — 83735 ASSAY OF MAGNESIUM: CPT | Performed by: INTERNAL MEDICINE

## 2018-01-01 PROCEDURE — 82947 ASSAY GLUCOSE BLOOD QUANT: CPT

## 2018-01-01 PROCEDURE — 99231 SBSQ HOSP IP/OBS SF/LOW 25: CPT | Performed by: INTERNAL MEDICINE

## 2018-01-01 PROCEDURE — 80076 HEPATIC FUNCTION PANEL: CPT | Performed by: INTERNAL MEDICINE

## 2018-01-01 PROCEDURE — 97530 THERAPEUTIC ACTIVITIES: CPT

## 2018-01-01 PROCEDURE — 85610 PROTHROMBIN TIME: CPT | Performed by: PHYSICIAN ASSISTANT

## 2018-01-01 PROCEDURE — 86921 COMPATIBILITY TEST INCUBATE: CPT

## 2018-01-01 PROCEDURE — 85018 HEMOGLOBIN: CPT | Performed by: PHYSICIAN ASSISTANT

## 2018-01-01 PROCEDURE — 84132 ASSAY OF SERUM POTASSIUM: CPT

## 2018-01-01 PROCEDURE — 85730 THROMBOPLASTIN TIME PARTIAL: CPT | Performed by: PHYSICIAN ASSISTANT

## 2018-01-01 PROCEDURE — 86901 BLOOD TYPING SEROLOGIC RH(D): CPT | Performed by: PHYSICIAN ASSISTANT

## 2018-01-01 PROCEDURE — 84145 PROCALCITONIN (PCT): CPT | Performed by: PHYSICIAN ASSISTANT

## 2018-01-01 PROCEDURE — 82103 ALPHA-1-ANTITRYPSIN TOTAL: CPT | Performed by: PHYSICIAN ASSISTANT

## 2018-01-01 PROCEDURE — P9017 PLASMA 1 DONOR FRZ W/IN 8 HR: HCPCS

## 2018-01-01 PROCEDURE — 93306 TTE W/DOPPLER COMPLETE: CPT

## 2018-01-01 PROCEDURE — 85300 ANTITHROMBIN III ACTIVITY: CPT | Performed by: PHYSICIAN ASSISTANT

## 2018-01-01 PROCEDURE — 85379 FIBRIN DEGRADATION QUANT: CPT | Performed by: PHYSICIAN ASSISTANT

## 2018-01-01 PROCEDURE — C1751 CATH, INF, PER/CENT/MIDLINE: HCPCS

## 2018-01-01 PROCEDURE — 84484 ASSAY OF TROPONIN QUANT: CPT | Performed by: PHYSICIAN ASSISTANT

## 2018-01-01 PROCEDURE — 93976 VASCULAR STUDY: CPT

## 2018-01-01 PROCEDURE — 82330 ASSAY OF CALCIUM: CPT | Performed by: INTERNAL MEDICINE

## 2018-01-01 PROCEDURE — 87077 CULTURE AEROBIC IDENTIFY: CPT | Performed by: EMERGENCY MEDICINE

## 2018-01-01 PROCEDURE — 85014 HEMATOCRIT: CPT

## 2018-01-01 PROCEDURE — 70450 CT HEAD/BRAIN W/O DYE: CPT

## 2018-01-01 PROCEDURE — 85025 COMPLETE CBC W/AUTO DIFF WBC: CPT | Performed by: PHYSICIAN ASSISTANT

## 2018-01-01 PROCEDURE — 99223 1ST HOSP IP/OBS HIGH 75: CPT | Performed by: INTERNAL MEDICINE

## 2018-01-01 PROCEDURE — 83010 ASSAY OF HAPTOGLOBIN QUANT: CPT | Performed by: INTERNAL MEDICINE

## 2018-01-01 PROCEDURE — 85420 FIBRINOLYTIC PLASMINOGEN: CPT | Performed by: PHYSICIAN ASSISTANT

## 2018-01-01 PROCEDURE — 83605 ASSAY OF LACTIC ACID: CPT | Performed by: PHYSICIAN ASSISTANT

## 2018-01-01 PROCEDURE — 93971 EXTREMITY STUDY: CPT | Performed by: SURGERY

## 2018-01-01 PROCEDURE — 82330 ASSAY OF CALCIUM: CPT

## 2018-01-01 PROCEDURE — 5A2204Z RESTORATION OF CARDIAC RHYTHM, SINGLE: ICD-10-PCS | Performed by: INTERNAL MEDICINE

## 2018-01-01 PROCEDURE — 82805 BLOOD GASES W/O2 SATURATION: CPT | Performed by: PHYSICIAN ASSISTANT

## 2018-01-01 PROCEDURE — 93306 TTE W/DOPPLER COMPLETE: CPT | Performed by: INTERNAL MEDICINE

## 2018-01-01 PROCEDURE — 85025 COMPLETE CBC W/AUTO DIFF WBC: CPT | Performed by: NURSE PRACTITIONER

## 2018-01-01 PROCEDURE — 86430 RHEUMATOID FACTOR TEST QUAL: CPT | Performed by: INTERNAL MEDICINE

## 2018-01-01 PROCEDURE — 84100 ASSAY OF PHOSPHORUS: CPT | Performed by: PHYSICIAN ASSISTANT

## 2018-01-01 PROCEDURE — 85007 BL SMEAR W/DIFF WBC COUNT: CPT | Performed by: PHYSICIAN ASSISTANT

## 2018-01-01 PROCEDURE — 84484 ASSAY OF TROPONIN QUANT: CPT | Performed by: INTERNAL MEDICINE

## 2018-01-01 PROCEDURE — 80048 BASIC METABOLIC PNL TOTAL CA: CPT | Performed by: INTERNAL MEDICINE

## 2018-01-01 PROCEDURE — 97163 PT EVAL HIGH COMPLEX 45 MIN: CPT

## 2018-01-01 PROCEDURE — 86235 NUCLEAR ANTIGEN ANTIBODY: CPT | Performed by: PHYSICIAN ASSISTANT

## 2018-01-01 PROCEDURE — 80076 HEPATIC FUNCTION PANEL: CPT | Performed by: PHYSICIAN ASSISTANT

## 2018-01-01 PROCEDURE — 99222 1ST HOSP IP/OBS MODERATE 55: CPT | Performed by: INTERNAL MEDICINE

## 2018-01-01 PROCEDURE — 82140 ASSAY OF AMMONIA: CPT | Performed by: NURSE PRACTITIONER

## 2018-01-01 PROCEDURE — 87040 BLOOD CULTURE FOR BACTERIA: CPT | Performed by: PHYSICIAN ASSISTANT

## 2018-01-01 PROCEDURE — 85362 FIBRIN DEGRADATION PRODUCTS: CPT | Performed by: PHYSICIAN ASSISTANT

## 2018-01-01 PROCEDURE — C9113 INJ PANTOPRAZOLE SODIUM, VIA: HCPCS | Performed by: PHYSICIAN ASSISTANT

## 2018-01-01 PROCEDURE — 93970 EXTREMITY STUDY: CPT

## 2018-01-01 PROCEDURE — 77001 FLUOROGUIDE FOR VEIN DEVICE: CPT | Performed by: RADIOLOGY

## 2018-01-01 PROCEDURE — 76937 US GUIDE VASCULAR ACCESS: CPT

## 2018-01-01 PROCEDURE — 84484 ASSAY OF TROPONIN QUANT: CPT | Performed by: NURSE PRACTITIONER

## 2018-01-01 PROCEDURE — 85610 PROTHROMBIN TIME: CPT | Performed by: INTERNAL MEDICINE

## 2018-01-01 PROCEDURE — 80053 COMPREHEN METABOLIC PANEL: CPT | Performed by: NURSE PRACTITIONER

## 2018-01-01 PROCEDURE — 80048 BASIC METABOLIC PNL TOTAL CA: CPT | Performed by: NURSE PRACTITIONER

## 2018-01-01 PROCEDURE — 30233N1 TRANSFUSION OF NONAUTOLOGOUS RED BLOOD CELLS INTO PERIPHERAL VEIN, PERCUTANEOUS APPROACH: ICD-10-PCS | Performed by: INTERNAL MEDICINE

## 2018-01-01 PROCEDURE — 83735 ASSAY OF MAGNESIUM: CPT | Performed by: PHYSICIAN ASSISTANT

## 2018-01-01 PROCEDURE — 86850 RBC ANTIBODY SCREEN: CPT | Performed by: PHYSICIAN ASSISTANT

## 2018-01-01 PROCEDURE — 80048 BASIC METABOLIC PNL TOTAL CA: CPT | Performed by: PHYSICIAN ASSISTANT

## 2018-01-01 PROCEDURE — 92610 EVALUATE SWALLOWING FUNCTION: CPT

## 2018-01-01 PROCEDURE — 83880 ASSAY OF NATRIURETIC PEPTIDE: CPT | Performed by: EMERGENCY MEDICINE

## 2018-01-01 PROCEDURE — 84484 ASSAY OF TROPONIN QUANT: CPT | Performed by: EMERGENCY MEDICINE

## 2018-01-01 PROCEDURE — 86256 FLUORESCENT ANTIBODY TITER: CPT | Performed by: PHYSICIAN ASSISTANT

## 2018-01-01 PROCEDURE — G8979 MOBILITY GOAL STATUS: HCPCS

## 2018-01-01 PROCEDURE — 86038 ANTINUCLEAR ANTIBODIES: CPT | Performed by: PHYSICIAN ASSISTANT

## 2018-01-01 PROCEDURE — 71250 CT THORAX DX C-: CPT

## 2018-01-01 PROCEDURE — 73610 X-RAY EXAM OF ANKLE: CPT

## 2018-01-01 PROCEDURE — 80076 HEPATIC FUNCTION PANEL: CPT | Performed by: EMERGENCY MEDICINE

## 2018-01-01 PROCEDURE — 85027 COMPLETE CBC AUTOMATED: CPT | Performed by: PHYSICIAN ASSISTANT

## 2018-01-01 PROCEDURE — 82390 ASSAY OF CERULOPLASMIN: CPT | Performed by: PHYSICIAN ASSISTANT

## 2018-01-01 PROCEDURE — 99232 SBSQ HOSP IP/OBS MODERATE 35: CPT | Performed by: INTERNAL MEDICINE

## 2018-01-01 PROCEDURE — 81001 URINALYSIS AUTO W/SCOPE: CPT | Performed by: EMERGENCY MEDICINE

## 2018-01-01 PROCEDURE — 94002 VENT MGMT INPAT INIT DAY: CPT

## 2018-01-01 PROCEDURE — 99291 CRITICAL CARE FIRST HOUR: CPT | Performed by: PHYSICIAN ASSISTANT

## 2018-01-01 PROCEDURE — 85025 COMPLETE CBC W/AUTO DIFF WBC: CPT | Performed by: INTERNAL MEDICINE

## 2018-01-01 PROCEDURE — 86705 HEP B CORE ANTIBODY IGM: CPT | Performed by: INTERNAL MEDICINE

## 2018-01-01 PROCEDURE — 84156 ASSAY OF PROTEIN URINE: CPT | Performed by: INTERNAL MEDICINE

## 2018-01-01 PROCEDURE — 36556 INSERT NON-TUNNEL CV CATH: CPT | Performed by: PHYSICIAN ASSISTANT

## 2018-01-01 PROCEDURE — 94644 CONT INHLJ TX 1ST HOUR: CPT

## 2018-01-01 PROCEDURE — 80053 COMPREHEN METABOLIC PANEL: CPT | Performed by: PHYSICIAN ASSISTANT

## 2018-01-01 PROCEDURE — 5A1D90Z PERFORMANCE OF URINARY FILTRATION, CONTINUOUS, GREATER THAN 18 HOURS PER DAY: ICD-10-PCS | Performed by: INTERNAL MEDICINE

## 2018-01-01 PROCEDURE — 97110 THERAPEUTIC EXERCISES: CPT

## 2018-01-01 PROCEDURE — 85049 AUTOMATED PLATELET COUNT: CPT | Performed by: PHYSICIAN ASSISTANT

## 2018-01-01 PROCEDURE — 84100 ASSAY OF PHOSPHORUS: CPT | Performed by: INTERNAL MEDICINE

## 2018-01-01 PROCEDURE — 86922 COMPATIBILITY TEST ANTIGLOB: CPT

## 2018-01-01 PROCEDURE — 83605 ASSAY OF LACTIC ACID: CPT | Performed by: EMERGENCY MEDICINE

## 2018-01-01 PROCEDURE — 85730 THROMBOPLASTIN TIME PARTIAL: CPT | Performed by: EMERGENCY MEDICINE

## 2018-01-01 PROCEDURE — 83918 ORGANIC ACIDS TOTAL QUANT: CPT | Performed by: INTERNAL MEDICINE

## 2018-01-01 PROCEDURE — 5A1935Z RESPIRATORY VENTILATION, LESS THAN 24 CONSECUTIVE HOURS: ICD-10-PCS | Performed by: INTERNAL MEDICINE

## 2018-01-01 PROCEDURE — 36600 WITHDRAWAL OF ARTERIAL BLOOD: CPT

## 2018-01-01 PROCEDURE — 85652 RBC SED RATE AUTOMATED: CPT | Performed by: INTERNAL MEDICINE

## 2018-01-01 PROCEDURE — 82803 BLOOD GASES ANY COMBINATION: CPT

## 2018-01-01 PROCEDURE — 83036 HEMOGLOBIN GLYCOSYLATED A1C: CPT | Performed by: NURSE PRACTITIONER

## 2018-01-01 PROCEDURE — 90945 DIALYSIS ONE EVALUATION: CPT

## 2018-01-01 PROCEDURE — G8996 SWALLOW CURRENT STATUS: HCPCS

## 2018-01-01 PROCEDURE — G8978 MOBILITY CURRENT STATUS: HCPCS

## 2018-01-01 PROCEDURE — 83540 ASSAY OF IRON: CPT | Performed by: INTERNAL MEDICINE

## 2018-01-01 PROCEDURE — G8988 SELF CARE GOAL STATUS: HCPCS

## 2018-01-01 PROCEDURE — 84439 ASSAY OF FREE THYROXINE: CPT | Performed by: NURSE PRACTITIONER

## 2018-01-01 PROCEDURE — 76700 US EXAM ABDOM COMPLETE: CPT

## 2018-01-01 PROCEDURE — 36569 INSJ PICC 5 YR+ W/O IMAGING: CPT

## 2018-01-01 PROCEDURE — P9037 PLATE PHERES LEUKOREDU IRRAD: HCPCS

## 2018-01-01 PROCEDURE — 82140 ASSAY OF AMMONIA: CPT | Performed by: PHYSICIAN ASSISTANT

## 2018-01-01 PROCEDURE — 80053 COMPREHEN METABOLIC PANEL: CPT | Performed by: INTERNAL MEDICINE

## 2018-01-01 PROCEDURE — 77001 FLUOROGUIDE FOR VEIN DEVICE: CPT

## 2018-01-01 PROCEDURE — 02HV33Z INSERTION OF INFUSION DEVICE INTO SUPERIOR VENA CAVA, PERCUTANEOUS APPROACH: ICD-10-PCS | Performed by: RADIOLOGY

## 2018-01-01 PROCEDURE — 5A12012 PERFORMANCE OF CARDIAC OUTPUT, SINGLE, MANUAL: ICD-10-PCS | Performed by: INTERNAL MEDICINE

## 2018-01-01 PROCEDURE — 83615 LACTATE (LD) (LDH) ENZYME: CPT | Performed by: INTERNAL MEDICINE

## 2018-01-01 PROCEDURE — 87086 URINE CULTURE/COLONY COUNT: CPT | Performed by: EMERGENCY MEDICINE

## 2018-01-01 PROCEDURE — 86704 HEP B CORE ANTIBODY TOTAL: CPT | Performed by: INTERNAL MEDICINE

## 2018-01-01 PROCEDURE — 83550 IRON BINDING TEST: CPT | Performed by: INTERNAL MEDICINE

## 2018-01-01 PROCEDURE — 85025 COMPLETE CBC W/AUTO DIFF WBC: CPT | Performed by: EMERGENCY MEDICINE

## 2018-01-01 PROCEDURE — 76937 US GUIDE VASCULAR ACCESS: CPT | Performed by: RADIOLOGY

## 2018-01-01 PROCEDURE — 02HV33Z INSERTION OF INFUSION DEVICE INTO SUPERIOR VENA CAVA, PERCUTANEOUS APPROACH: ICD-10-PCS | Performed by: INTERNAL MEDICINE

## 2018-01-01 PROCEDURE — 82570 ASSAY OF URINE CREATININE: CPT | Performed by: INTERNAL MEDICINE

## 2018-01-01 PROCEDURE — 87340 HEPATITIS B SURFACE AG IA: CPT | Performed by: INTERNAL MEDICINE

## 2018-01-01 PROCEDURE — 36569 INSJ PICC 5 YR+ W/O IMAGING: CPT | Performed by: RADIOLOGY

## 2018-01-01 PROCEDURE — 84443 ASSAY THYROID STIM HORMONE: CPT | Performed by: EMERGENCY MEDICINE

## 2018-01-01 PROCEDURE — C9132 KCENTRA, PER I.U.: HCPCS | Performed by: PHYSICIAN ASSISTANT

## 2018-01-01 PROCEDURE — 93970 EXTREMITY STUDY: CPT | Performed by: SURGERY

## 2018-01-01 PROCEDURE — 36415 COLL VENOUS BLD VENIPUNCTURE: CPT | Performed by: EMERGENCY MEDICINE

## 2018-01-01 PROCEDURE — 86870 RBC ANTIBODY IDENTIFICATION: CPT | Performed by: PHYSICIAN ASSISTANT

## 2018-01-01 PROCEDURE — 85610 PROTHROMBIN TIME: CPT | Performed by: EMERGENCY MEDICINE

## 2018-01-01 PROCEDURE — 81001 URINALYSIS AUTO W/SCOPE: CPT | Performed by: INTERNAL MEDICINE

## 2018-01-01 PROCEDURE — 83090 ASSAY OF HOMOCYSTEINE: CPT | Performed by: INTERNAL MEDICINE

## 2018-01-01 PROCEDURE — 99292 CRITICAL CARE ADDL 30 MIN: CPT | Performed by: INTERNAL MEDICINE

## 2018-01-01 PROCEDURE — G8987 SELF CARE CURRENT STATUS: HCPCS

## 2018-01-01 PROCEDURE — 99285 EMERGENCY DEPT VISIT HI MDM: CPT

## 2018-01-01 PROCEDURE — 80047 BASIC METABLC PNL IONIZED CA: CPT

## 2018-01-01 PROCEDURE — 83605 ASSAY OF LACTIC ACID: CPT | Performed by: NURSE PRACTITIONER

## 2018-01-01 PROCEDURE — 87040 BLOOD CULTURE FOR BACTERIA: CPT | Performed by: INTERNAL MEDICINE

## 2018-01-01 PROCEDURE — 99291 CRITICAL CARE FIRST HOUR: CPT | Performed by: INTERNAL MEDICINE

## 2018-01-01 PROCEDURE — 82607 VITAMIN B-12: CPT | Performed by: INTERNAL MEDICINE

## 2018-01-01 PROCEDURE — 30233K1 TRANSFUSION OF NONAUTOLOGOUS FROZEN PLASMA INTO PERIPHERAL VEIN, PERCUTANEOUS APPROACH: ICD-10-PCS | Performed by: INTERNAL MEDICINE

## 2018-01-01 PROCEDURE — 99232 SBSQ HOSP IP/OBS MODERATE 35: CPT | Performed by: NURSE PRACTITIONER

## 2018-01-01 PROCEDURE — 85397 CLOTTING FUNCT ACTIVITY: CPT | Performed by: INTERNAL MEDICINE

## 2018-01-01 PROCEDURE — 85027 COMPLETE CBC AUTOMATED: CPT | Performed by: INTERNAL MEDICINE

## 2018-01-01 PROCEDURE — 86803 HEPATITIS C AB TEST: CPT | Performed by: INTERNAL MEDICINE

## 2018-01-01 PROCEDURE — 80074 ACUTE HEPATITIS PANEL: CPT | Performed by: INTERNAL MEDICINE

## 2018-01-01 PROCEDURE — 96360 HYDRATION IV INFUSION INIT: CPT

## 2018-01-01 RX ORDER — PREDNISOLONE ACETATE 10 MG/ML
1 SUSPENSION/ DROPS OPHTHALMIC 4 TIMES DAILY
COMMUNITY
End: 2018-01-01 | Stop reason: HOSPADM

## 2018-01-01 RX ORDER — LORAZEPAM 2 MG/ML
2 INJECTION INTRAMUSCULAR ONCE
Status: COMPLETED | OUTPATIENT
Start: 2018-01-01 | End: 2018-01-01

## 2018-01-01 RX ORDER — HALOPERIDOL 5 MG/ML
2 INJECTION INTRAMUSCULAR ONCE
Status: DISCONTINUED | OUTPATIENT
Start: 2018-01-01 | End: 2018-01-01

## 2018-01-01 RX ORDER — DEXTROSE 50 % IN WATER 50 %
SYRINGE (ML) INTRAVENOUS CODE/TRAUMA/SEDATION MEDICATION
Status: COMPLETED | OUTPATIENT
Start: 2018-01-01 | End: 2018-01-01

## 2018-01-01 RX ORDER — MORPHINE SULFATE 4 MG/ML
4 INJECTION, SOLUTION INTRAMUSCULAR; INTRAVENOUS ONCE
Status: COMPLETED | OUTPATIENT
Start: 2018-01-01 | End: 2018-01-01

## 2018-01-01 RX ORDER — PREDNISOLONE ACETATE 10 MG/ML
1 SUSPENSION/ DROPS OPHTHALMIC 4 TIMES DAILY
Status: DISCONTINUED | OUTPATIENT
Start: 2018-01-01 | End: 2018-01-01

## 2018-01-01 RX ORDER — VANCOMYCIN HYDROCHLORIDE 1 G/200ML
15 INJECTION, SOLUTION INTRAVENOUS EVERY 24 HOURS
Status: DISCONTINUED | OUTPATIENT
Start: 2018-01-01 | End: 2018-01-01

## 2018-01-01 RX ORDER — METOLAZONE 5 MG/1
5 TABLET ORAL DAILY
Status: DISCONTINUED | OUTPATIENT
Start: 2018-01-01 | End: 2018-01-01

## 2018-01-01 RX ORDER — ALBUTEROL SULFATE 90 UG/1
2 AEROSOL, METERED RESPIRATORY (INHALATION) EVERY 4 HOURS PRN
Status: DISCONTINUED | OUTPATIENT
Start: 2018-01-01 | End: 2018-01-01

## 2018-01-01 RX ORDER — FUROSEMIDE 20 MG/1
20 TABLET ORAL
Status: DISCONTINUED | OUTPATIENT
Start: 2018-01-01 | End: 2018-01-01

## 2018-01-01 RX ORDER — ONDANSETRON 2 MG/ML
4 INJECTION INTRAMUSCULAR; INTRAVENOUS EVERY 6 HOURS PRN
Status: DISCONTINUED | OUTPATIENT
Start: 2018-01-01 | End: 2018-01-01

## 2018-01-01 RX ORDER — METOPROLOL TARTRATE 5 MG/5ML
5 INJECTION INTRAVENOUS EVERY 6 HOURS PRN
Status: DISCONTINUED | OUTPATIENT
Start: 2018-01-01 | End: 2018-01-01

## 2018-01-01 RX ORDER — FUROSEMIDE 10 MG/ML
20 INJECTION INTRAMUSCULAR; INTRAVENOUS ONCE
Status: COMPLETED | OUTPATIENT
Start: 2018-01-01 | End: 2018-01-01

## 2018-01-01 RX ORDER — HYDRALAZINE HYDROCHLORIDE 25 MG/1
25 TABLET, FILM COATED ORAL 3 TIMES DAILY
Status: DISCONTINUED | OUTPATIENT
Start: 2018-01-01 | End: 2018-01-01

## 2018-01-01 RX ORDER — ALBUMIN (HUMAN) 12.5 G/50ML
12.5 SOLUTION INTRAVENOUS ONCE
Status: COMPLETED | OUTPATIENT
Start: 2018-01-01 | End: 2018-01-01

## 2018-01-01 RX ORDER — FUROSEMIDE 10 MG/ML
80 INJECTION INTRAMUSCULAR; INTRAVENOUS ONCE
Status: COMPLETED | OUTPATIENT
Start: 2018-01-01 | End: 2018-01-01

## 2018-01-01 RX ORDER — EPINEPHRINE 0.1 MG/ML
SYRINGE (ML) INJECTION CODE/TRAUMA/SEDATION MEDICATION
Status: COMPLETED | OUTPATIENT
Start: 2018-01-01 | End: 2018-01-01

## 2018-01-01 RX ORDER — ALBUTEROL SULFATE 1.25 MG/3ML
1 SOLUTION RESPIRATORY (INHALATION) EVERY 4 HOURS PRN
COMMUNITY
End: 2018-01-01 | Stop reason: HOSPADM

## 2018-01-01 RX ORDER — GLIPIZIDE 5 MG/1
2.5 TABLET ORAL
COMMUNITY
End: 2018-01-01 | Stop reason: HOSPADM

## 2018-01-01 RX ORDER — DIAZEPAM 5 MG/1
5 TABLET ORAL EVERY 6 HOURS PRN
Status: DISCONTINUED | OUTPATIENT
Start: 2018-01-01 | End: 2018-01-01

## 2018-01-01 RX ORDER — PANTOPRAZOLE SODIUM 40 MG/1
40 INJECTION, POWDER, FOR SOLUTION INTRAVENOUS DAILY
Status: DISCONTINUED | OUTPATIENT
Start: 2018-01-01 | End: 2018-01-01

## 2018-01-01 RX ORDER — TRAMADOL HYDROCHLORIDE 50 MG/1
50 TABLET ORAL ONCE
Status: COMPLETED | OUTPATIENT
Start: 2018-01-01 | End: 2018-01-01

## 2018-01-01 RX ORDER — FUROSEMIDE 20 MG/1
20 TABLET ORAL 2 TIMES DAILY
Status: DISCONTINUED | OUTPATIENT
Start: 2018-01-01 | End: 2018-01-01

## 2018-01-01 RX ORDER — LORAZEPAM 2 MG/ML
INJECTION INTRAMUSCULAR
Status: COMPLETED
Start: 2018-01-01 | End: 2018-01-01

## 2018-01-01 RX ORDER — FUROSEMIDE 20 MG/1
20 TABLET ORAL 2 TIMES DAILY
COMMUNITY
End: 2018-01-01 | Stop reason: HOSPADM

## 2018-01-01 RX ORDER — POLYETHYLENE GLYCOL 3350 17 G/17G
17 POWDER, FOR SOLUTION ORAL DAILY
Status: DISCONTINUED | OUTPATIENT
Start: 2018-01-01 | End: 2018-01-01

## 2018-01-01 RX ORDER — MUSCLE RUB CREAM 100; 150 MG/G; MG/G
CREAM TOPICAL 4 TIMES DAILY PRN
Status: DISCONTINUED | OUTPATIENT
Start: 2018-01-01 | End: 2018-01-01

## 2018-01-01 RX ORDER — ALBUTEROL SULFATE 2.5 MG/3ML
10 SOLUTION RESPIRATORY (INHALATION) ONCE
Status: COMPLETED | OUTPATIENT
Start: 2018-01-01 | End: 2018-01-01

## 2018-01-01 RX ORDER — VANCOMYCIN HYDROCHLORIDE 1 G/200ML
12.5 INJECTION, SOLUTION INTRAVENOUS EVERY 24 HOURS
Status: DISCONTINUED | OUTPATIENT
Start: 2018-01-01 | End: 2018-01-01

## 2018-01-01 RX ORDER — ETOMIDATE 2 MG/ML
INJECTION INTRAVENOUS CODE/TRAUMA/SEDATION MEDICATION
Status: COMPLETED | OUTPATIENT
Start: 2018-01-01 | End: 2018-01-01

## 2018-01-01 RX ORDER — FUROSEMIDE 10 MG/ML
40 INJECTION INTRAMUSCULAR; INTRAVENOUS ONCE
Status: COMPLETED | OUTPATIENT
Start: 2018-01-01 | End: 2018-01-01

## 2018-01-01 RX ORDER — DEXTROSE MONOHYDRATE 25 G/50ML
50 INJECTION, SOLUTION INTRAVENOUS ONCE
Status: COMPLETED | OUTPATIENT
Start: 2018-01-01 | End: 2018-01-01

## 2018-01-01 RX ORDER — ALBUTEROL SULFATE 90 UG/1
2 AEROSOL, METERED RESPIRATORY (INHALATION) EVERY 4 HOURS PRN
COMMUNITY
End: 2018-01-01 | Stop reason: HOSPADM

## 2018-01-01 RX ORDER — ATROPINE SULFATE 0.1 MG/ML
INJECTION, SOLUTION ENDOTRACHEAL; INTRAMUSCULAR; INTRAVENOUS; SUBCUTANEOUS CODE/TRAUMA/SEDATION MEDICATION
Status: COMPLETED | OUTPATIENT
Start: 2018-01-01 | End: 2018-01-01

## 2018-01-01 RX ORDER — SODIUM BICARBONATE 84 MG/ML
INJECTION, SOLUTION INTRAVENOUS CODE/TRAUMA/SEDATION MEDICATION
Status: COMPLETED | OUTPATIENT
Start: 2018-01-01 | End: 2018-01-01

## 2018-01-01 RX ORDER — CALCIUM CHLORIDE 100 MG/ML
SYRINGE (ML) INTRAVENOUS CODE/TRAUMA/SEDATION MEDICATION
Status: COMPLETED | OUTPATIENT
Start: 2018-01-01 | End: 2018-01-01

## 2018-01-01 RX ORDER — FUROSEMIDE 10 MG/ML
10 SYRINGE (ML) INJECTION CONTINUOUS
Status: DISCONTINUED | OUTPATIENT
Start: 2018-01-01 | End: 2018-01-01

## 2018-01-01 RX ORDER — CHOLECALCIFEROL (VITAMIN D3) 10 MCG
1 TABLET ORAL
Status: DISCONTINUED | OUTPATIENT
Start: 2018-01-01 | End: 2018-01-01

## 2018-01-01 RX ORDER — KETOROLAC TROMETHAMINE 5 MG/ML
1 SOLUTION OPHTHALMIC 4 TIMES DAILY
COMMUNITY
End: 2018-01-01 | Stop reason: HOSPADM

## 2018-01-01 RX ORDER — ACETAMINOPHEN 650 MG/1
650 SUPPOSITORY RECTAL EVERY 4 HOURS PRN
Status: DISCONTINUED | OUTPATIENT
Start: 2018-01-01 | End: 2018-01-01

## 2018-01-01 RX ORDER — BISACODYL 10 MG
10 SUPPOSITORY, RECTAL RECTAL DAILY PRN
Status: DISCONTINUED | OUTPATIENT
Start: 2018-01-01 | End: 2018-01-01

## 2018-01-01 RX ORDER — OFLOXACIN 3 MG/ML
1 SOLUTION/ DROPS OPHTHALMIC 4 TIMES DAILY
Status: DISCONTINUED | OUTPATIENT
Start: 2018-01-01 | End: 2018-01-01

## 2018-01-01 RX ORDER — PHYTONADIONE 5 MG/1
5 TABLET ORAL ONCE
Status: COMPLETED | OUTPATIENT
Start: 2018-01-01 | End: 2018-01-01

## 2018-01-01 RX ORDER — FUROSEMIDE 10 MG/ML
20 SYRINGE (ML) INJECTION CONTINUOUS
Status: DISCONTINUED | OUTPATIENT
Start: 2018-01-01 | End: 2018-01-01

## 2018-01-01 RX ORDER — ACETAMINOPHEN 325 MG/1
650 TABLET ORAL EVERY 6 HOURS PRN
Status: DISCONTINUED | OUTPATIENT
Start: 2018-01-01 | End: 2018-01-01

## 2018-01-01 RX ORDER — HEPARIN SODIUM 5000 [USP'U]/ML
5000 INJECTION, SOLUTION INTRAVENOUS; SUBCUTANEOUS EVERY 8 HOURS SCHEDULED
Status: DISCONTINUED | OUTPATIENT
Start: 2018-01-01 | End: 2018-01-01

## 2018-01-01 RX ORDER — HYDRALAZINE HYDROCHLORIDE 25 MG/1
25 TABLET, FILM COATED ORAL 3 TIMES DAILY
COMMUNITY
End: 2018-01-01 | Stop reason: HOSPADM

## 2018-01-01 RX ORDER — DEXTROSE MONOHYDRATE 25 G/50ML
INJECTION, SOLUTION INTRAVENOUS
Status: COMPLETED
Start: 2018-01-01 | End: 2018-01-01

## 2018-01-01 RX ORDER — OFLOXACIN 3 MG/ML
1 SOLUTION/ DROPS OPHTHALMIC 4 TIMES DAILY
COMMUNITY
End: 2018-01-01 | Stop reason: HOSPADM

## 2018-01-01 RX ORDER — DOCUSATE SODIUM 100 MG/1
100 CAPSULE, LIQUID FILLED ORAL 2 TIMES DAILY
Status: DISCONTINUED | OUTPATIENT
Start: 2018-01-01 | End: 2018-01-01

## 2018-01-01 RX ORDER — ACETAMINOPHEN 160 MG/5ML
650 SUSPENSION, ORAL (FINAL DOSE FORM) ORAL EVERY 6 HOURS PRN
Status: DISCONTINUED | OUTPATIENT
Start: 2018-01-01 | End: 2018-01-01

## 2018-01-01 RX ORDER — KETOROLAC TROMETHAMINE 5 MG/ML
1 SOLUTION OPHTHALMIC 4 TIMES DAILY
Status: DISCONTINUED | OUTPATIENT
Start: 2018-01-01 | End: 2018-01-01

## 2018-01-01 RX ADMIN — POLYETHYLENE GLYCOL 3350 17 G: 17 POWDER, FOR SOLUTION ORAL at 08:10

## 2018-01-01 RX ADMIN — OFLOXACIN 1 DROP: 3 SOLUTION/ DROPS OPHTHALMIC at 09:35

## 2018-01-01 RX ADMIN — INSULIN LISPRO 1 UNITS: 100 INJECTION, SOLUTION INTRAVENOUS; SUBCUTANEOUS at 16:42

## 2018-01-01 RX ADMIN — HYDRALAZINE HYDROCHLORIDE 25 MG: 25 TABLET ORAL at 22:37

## 2018-01-01 RX ADMIN — PREDNISOLONE ACETATE 1 DROP: 10 SUSPENSION/ DROPS OPHTHALMIC at 12:00

## 2018-01-01 RX ADMIN — APIXABAN 5 MG: 5 TABLET, FILM COATED ORAL at 17:49

## 2018-01-01 RX ADMIN — AMPICILLIN SODIUM 2000 MG: 2 INJECTION, POWDER, FOR SOLUTION INTRAMUSCULAR; INTRAVENOUS at 03:14

## 2018-01-01 RX ADMIN — AMPICILLIN SODIUM 2000 MG: 2 INJECTION, POWDER, FOR SOLUTION INTRAMUSCULAR; INTRAVENOUS at 21:18

## 2018-01-01 RX ADMIN — PREDNISOLONE ACETATE 1 DROP: 10 SUSPENSION/ DROPS OPHTHALMIC at 18:38

## 2018-01-01 RX ADMIN — AMPICILLIN SODIUM 2000 MG: 2 INJECTION, POWDER, FOR SOLUTION INTRAMUSCULAR; INTRAVENOUS at 09:15

## 2018-01-01 RX ADMIN — PREDNISOLONE ACETATE 1 DROP: 10 SUSPENSION/ DROPS OPHTHALMIC at 10:14

## 2018-01-01 RX ADMIN — KETOROLAC TROMETHAMINE 1 DROP: 5 SOLUTION OPHTHALMIC at 13:17

## 2018-01-01 RX ADMIN — DOCUSATE SODIUM 100 MG: 100 CAPSULE, LIQUID FILLED ORAL at 10:04

## 2018-01-01 RX ADMIN — SODIUM BICARBONATE 50 MEQ: 84 INJECTION, SOLUTION INTRAVENOUS at 01:19

## 2018-01-01 RX ADMIN — PREDNISOLONE ACETATE 1 DROP: 10 SUSPENSION/ DROPS OPHTHALMIC at 22:17

## 2018-01-01 RX ADMIN — PREDNISOLONE ACETATE 1 DROP: 10 SUSPENSION/ DROPS OPHTHALMIC at 22:53

## 2018-01-01 RX ADMIN — KETOROLAC TROMETHAMINE 1 DROP: 5 SOLUTION OPHTHALMIC at 14:22

## 2018-01-01 RX ADMIN — DOCUSATE SODIUM 100 MG: 100 CAPSULE, LIQUID FILLED ORAL at 17:50

## 2018-01-01 RX ADMIN — FUROSEMIDE 20 MG: 10 INJECTION, SOLUTION INTRAMUSCULAR; INTRAVENOUS at 20:53

## 2018-01-01 RX ADMIN — MENTHOL, METHYL SALICYLATE: 10; 15 CREAM TOPICAL at 13:11

## 2018-01-01 RX ADMIN — AMPICILLIN SODIUM 2000 MG: 2 INJECTION, POWDER, FOR SOLUTION INTRAMUSCULAR; INTRAVENOUS at 08:45

## 2018-01-01 RX ADMIN — FUROSEMIDE 40 MG: 10 INJECTION, SOLUTION INTRAMUSCULAR; INTRAVENOUS at 22:40

## 2018-01-01 RX ADMIN — PREDNISOLONE ACETATE 1 DROP: 10 SUSPENSION/ DROPS OPHTHALMIC at 10:06

## 2018-01-01 RX ADMIN — HYDRALAZINE HYDROCHLORIDE 25 MG: 25 TABLET ORAL at 21:31

## 2018-01-01 RX ADMIN — APIXABAN 5 MG: 5 TABLET, FILM COATED ORAL at 10:05

## 2018-01-01 RX ADMIN — OFLOXACIN 1 DROP: 3 SOLUTION/ DROPS OPHTHALMIC at 21:34

## 2018-01-01 RX ADMIN — PREDNISOLONE ACETATE 1 DROP: 10 SUSPENSION/ DROPS OPHTHALMIC at 09:34

## 2018-01-01 RX ADMIN — DIAZEPAM 5 MG: 5 TABLET ORAL at 14:58

## 2018-01-01 RX ADMIN — AMPICILLIN SODIUM 2000 MG: 2 INJECTION, POWDER, FOR SOLUTION INTRAMUSCULAR; INTRAVENOUS at 06:33

## 2018-01-01 RX ADMIN — Medication 1 G: at 12:05

## 2018-01-01 RX ADMIN — KETOROLAC TROMETHAMINE 1 DROP: 5 SOLUTION OPHTHALMIC at 22:25

## 2018-01-01 RX ADMIN — EPINEPHRINE 1 MG: 0.1 INJECTION, SOLUTION ENDOTRACHEAL; INTRACARDIAC; INTRAVENOUS at 01:21

## 2018-01-01 RX ADMIN — MENTHOL, METHYL SALICYLATE: 10; 15 CREAM TOPICAL at 08:26

## 2018-01-01 RX ADMIN — PREDNISOLONE ACETATE 1 DROP: 10 SUSPENSION/ DROPS OPHTHALMIC at 21:32

## 2018-01-01 RX ADMIN — INSULIN LISPRO 1 UNITS: 100 INJECTION, SOLUTION INTRAVENOUS; SUBCUTANEOUS at 13:58

## 2018-01-01 RX ADMIN — FUROSEMIDE 20 MG: 10 INJECTION, SOLUTION INTRAMUSCULAR; INTRAVENOUS at 12:37

## 2018-01-01 RX ADMIN — NEPHROCAP 1 CAPSULE: 1 CAP ORAL at 18:01

## 2018-01-01 RX ADMIN — KETOROLAC TROMETHAMINE 1 DROP: 5 SOLUTION OPHTHALMIC at 10:12

## 2018-01-01 RX ADMIN — METOLAZONE 5 MG: 5 TABLET ORAL at 12:16

## 2018-01-01 RX ADMIN — Medication 10 MG/HR: at 13:23

## 2018-01-01 RX ADMIN — TRAMADOL HYDROCHLORIDE 50 MG: 50 TABLET, COATED ORAL at 00:36

## 2018-01-01 RX ADMIN — METOPROLOL TARTRATE 12.5 MG: 25 TABLET ORAL at 08:46

## 2018-01-01 RX ADMIN — FUROSEMIDE 20 MG: 20 TABLET ORAL at 18:12

## 2018-01-01 RX ADMIN — DESMOPRESSIN ACETATE 20 MCG: 4 SOLUTION INTRAVENOUS at 23:25

## 2018-01-01 RX ADMIN — INSULIN LISPRO 1 UNITS: 100 INJECTION, SOLUTION INTRAVENOUS; SUBCUTANEOUS at 12:13

## 2018-01-01 RX ADMIN — POLYETHYLENE GLYCOL 3350 17 G: 17 POWDER, FOR SOLUTION ORAL at 10:04

## 2018-01-01 RX ADMIN — HYDRALAZINE HYDROCHLORIDE 25 MG: 25 TABLET ORAL at 22:25

## 2018-01-01 RX ADMIN — NEPHROCAP 1 CAPSULE: 1 CAP ORAL at 18:12

## 2018-01-01 RX ADMIN — Medication 3500 UNITS: at 22:35

## 2018-01-01 RX ADMIN — MENTHOL, METHYL SALICYLATE 1 APPLICATION: 10; 15 CREAM TOPICAL at 20:23

## 2018-01-01 RX ADMIN — KETOROLAC TROMETHAMINE 1 DROP: 5 SOLUTION OPHTHALMIC at 21:34

## 2018-01-01 RX ADMIN — AMPICILLIN SODIUM 2000 MG: 2 INJECTION, POWDER, FOR SOLUTION INTRAMUSCULAR; INTRAVENOUS at 16:24

## 2018-01-01 RX ADMIN — PREDNISOLONE ACETATE 1 DROP: 10 SUSPENSION/ DROPS OPHTHALMIC at 17:05

## 2018-01-01 RX ADMIN — PREDNISOLONE ACETATE 1 DROP: 10 SUSPENSION/ DROPS OPHTHALMIC at 08:45

## 2018-01-01 RX ADMIN — PREDNISOLONE ACETATE 1 DROP: 10 SUSPENSION/ DROPS OPHTHALMIC at 14:22

## 2018-01-01 RX ADMIN — MORPHINE SULFATE 4 MG: 4 INJECTION INTRAVENOUS at 03:05

## 2018-01-01 RX ADMIN — MENTHOL, METHYL SALICYLATE: 10; 15 CREAM TOPICAL at 00:41

## 2018-01-01 RX ADMIN — AMPICILLIN SODIUM 2000 MG: 2 INJECTION, POWDER, FOR SOLUTION INTRAMUSCULAR; INTRAVENOUS at 14:21

## 2018-01-01 RX ADMIN — APIXABAN 5 MG: 5 TABLET, FILM COATED ORAL at 17:25

## 2018-01-01 RX ADMIN — PANTOPRAZOLE SODIUM 40 MG: 40 INJECTION, POWDER, FOR SOLUTION INTRAVENOUS at 00:51

## 2018-01-01 RX ADMIN — AMPICILLIN SODIUM 2000 MG: 2 INJECTION, POWDER, FOR SOLUTION INTRAMUSCULAR; INTRAVENOUS at 15:01

## 2018-01-01 RX ADMIN — OFLOXACIN 1 DROP: 3 SOLUTION/ DROPS OPHTHALMIC at 22:26

## 2018-01-01 RX ADMIN — FUROSEMIDE 20 MG: 20 TABLET ORAL at 10:13

## 2018-01-01 RX ADMIN — ALBUTEROL SULFATE 10 MG: 2.5 SOLUTION RESPIRATORY (INHALATION) at 00:51

## 2018-01-01 RX ADMIN — OFLOXACIN 1 DROP: 3 SOLUTION/ DROPS OPHTHALMIC at 22:17

## 2018-01-01 RX ADMIN — OFLOXACIN 1 DROP: 3 SOLUTION/ DROPS OPHTHALMIC at 18:38

## 2018-01-01 RX ADMIN — PREDNISOLONE ACETATE 1 DROP: 10 SUSPENSION/ DROPS OPHTHALMIC at 01:13

## 2018-01-01 RX ADMIN — FUROSEMIDE 20 MG: 20 TABLET ORAL at 08:10

## 2018-01-01 RX ADMIN — FUROSEMIDE 20 MG: 20 TABLET ORAL at 17:50

## 2018-01-01 RX ADMIN — INSULIN LISPRO 2 UNITS: 100 INJECTION, SOLUTION INTRAVENOUS; SUBCUTANEOUS at 22:47

## 2018-01-01 RX ADMIN — AMPICILLIN SODIUM 2000 MG: 2 INJECTION, POWDER, FOR SOLUTION INTRAMUSCULAR; INTRAVENOUS at 02:21

## 2018-01-01 RX ADMIN — ACETAMINOPHEN 650 MG: 325 TABLET, FILM COATED ORAL at 08:46

## 2018-01-01 RX ADMIN — SODIUM BICARBONATE 100 MEQ: 84 INJECTION, SOLUTION INTRAVENOUS at 00:17

## 2018-01-01 RX ADMIN — ACETAMINOPHEN 650 MG: 325 TABLET, FILM COATED ORAL at 17:49

## 2018-01-01 RX ADMIN — Medication 1 G: at 18:22

## 2018-01-01 RX ADMIN — AMPICILLIN SODIUM 2000 MG: 2 INJECTION, POWDER, FOR SOLUTION INTRAMUSCULAR; INTRAVENOUS at 10:08

## 2018-01-01 RX ADMIN — METOPROLOL TARTRATE 12.5 MG: 25 TABLET ORAL at 21:18

## 2018-01-01 RX ADMIN — PREDNISOLONE ACETATE 1 DROP: 10 SUSPENSION/ DROPS OPHTHALMIC at 13:59

## 2018-01-01 RX ADMIN — ACETAMINOPHEN 650 MG: 325 TABLET, FILM COATED ORAL at 20:24

## 2018-01-01 RX ADMIN — PREDNISOLONE ACETATE 1 DROP: 10 SUSPENSION/ DROPS OPHTHALMIC at 22:25

## 2018-01-01 RX ADMIN — DEXTROSE MONOHYDRATE 25 G: 500 INJECTION PARENTERAL at 01:20

## 2018-01-01 RX ADMIN — ONDANSETRON 4 MG: 2 INJECTION INTRAMUSCULAR; INTRAVENOUS at 08:32

## 2018-01-01 RX ADMIN — IRON SUCROSE 300 MG: 20 INJECTION, SOLUTION INTRAVENOUS at 10:13

## 2018-01-01 RX ADMIN — ACETAMINOPHEN 650 MG: 325 TABLET, FILM COATED ORAL at 08:32

## 2018-01-01 RX ADMIN — ETOMIDATE 20 MG: 2 INJECTION, SOLUTION INTRAVENOUS at 23:59

## 2018-01-01 RX ADMIN — Medication 2 G: at 01:33

## 2018-01-01 RX ADMIN — IRON SUCROSE 300 MG: 20 INJECTION, SOLUTION INTRAVENOUS at 16:42

## 2018-01-01 RX ADMIN — ACETAMINOPHEN 650 MG: 325 TABLET, FILM COATED ORAL at 23:05

## 2018-01-01 RX ADMIN — METOPROLOL TARTRATE 12.5 MG: 25 TABLET ORAL at 08:10

## 2018-01-01 RX ADMIN — KETOROLAC TROMETHAMINE 1 DROP: 5 SOLUTION OPHTHALMIC at 09:35

## 2018-01-01 RX ADMIN — ACETAMINOPHEN 650 MG: 325 TABLET, FILM COATED ORAL at 14:46

## 2018-01-01 RX ADMIN — KETOROLAC TROMETHAMINE 1 DROP: 5 SOLUTION OPHTHALMIC at 22:17

## 2018-01-01 RX ADMIN — OFLOXACIN 1 DROP: 3 SOLUTION/ DROPS OPHTHALMIC at 14:22

## 2018-01-01 RX ADMIN — PREDNISOLONE ACETATE 1 DROP: 10 SUSPENSION/ DROPS OPHTHALMIC at 17:26

## 2018-01-01 RX ADMIN — FUROSEMIDE 200 MG: 10 INJECTION, SOLUTION INTRAVENOUS at 12:16

## 2018-01-01 RX ADMIN — SODIUM CHLORIDE 250 ML: 0.9 INJECTION, SOLUTION INTRAVENOUS at 17:57

## 2018-01-01 RX ADMIN — POLYETHYLENE GLYCOL 3350 17 G: 17 POWDER, FOR SOLUTION ORAL at 08:46

## 2018-01-01 RX ADMIN — HYDRALAZINE HYDROCHLORIDE 25 MG: 25 TABLET ORAL at 09:34

## 2018-01-01 RX ADMIN — PREDNISOLONE ACETATE 1 DROP: 10 SUSPENSION/ DROPS OPHTHALMIC at 21:34

## 2018-01-01 RX ADMIN — ATROPINE SULFATE 1 MG: 0.1 INJECTION, SOLUTION ENDOTRACHEAL; INTRAMUSCULAR; INTRAVENOUS; SUBCUTANEOUS at 23:51

## 2018-01-01 RX ADMIN — KETOROLAC TROMETHAMINE 1 DROP: 5 SOLUTION OPHTHALMIC at 18:39

## 2018-01-01 RX ADMIN — ALBUMIN HUMAN 12.5 G: 0.25 SOLUTION INTRAVENOUS at 20:07

## 2018-01-01 RX ADMIN — Medication 1 G: at 06:21

## 2018-01-01 RX ADMIN — LORAZEPAM 2 MG: 2 INJECTION INTRAMUSCULAR at 03:07

## 2018-01-01 RX ADMIN — HYDRALAZINE HYDROCHLORIDE 25 MG: 25 TABLET ORAL at 20:42

## 2018-01-01 RX ADMIN — AMPICILLIN SODIUM 2000 MG: 2 INJECTION, POWDER, FOR SOLUTION INTRAMUSCULAR; INTRAVENOUS at 16:27

## 2018-01-01 RX ADMIN — PREDNISOLONE ACETATE 1 DROP: 10 SUSPENSION/ DROPS OPHTHALMIC at 18:12

## 2018-01-01 RX ADMIN — PREDNISOLONE ACETATE 1 DROP: 10 SUSPENSION/ DROPS OPHTHALMIC at 17:29

## 2018-01-01 RX ADMIN — APIXABAN 5 MG: 5 TABLET, FILM COATED ORAL at 18:12

## 2018-01-01 RX ADMIN — AMPICILLIN SODIUM 2000 MG: 2 INJECTION, POWDER, FOR SOLUTION INTRAMUSCULAR; INTRAVENOUS at 21:32

## 2018-01-01 RX ADMIN — HYDRALAZINE HYDROCHLORIDE 25 MG: 25 TABLET ORAL at 18:28

## 2018-01-01 RX ADMIN — BISACODYL 10 MG: 10 SUPPOSITORY RECTAL at 14:21

## 2018-01-01 RX ADMIN — PHYTONADIONE 5 MG: 10 INJECTION, EMULSION INTRAMUSCULAR; INTRAVENOUS; SUBCUTANEOUS at 20:59

## 2018-01-01 RX ADMIN — PREDNISOLONE ACETATE 1 DROP: 10 SUSPENSION/ DROPS OPHTHALMIC at 22:03

## 2018-01-01 RX ADMIN — ALBUMIN HUMAN 12.5 G: 0.25 SOLUTION INTRAVENOUS at 20:55

## 2018-01-01 RX ADMIN — OFLOXACIN 1 DROP: 3 SOLUTION/ DROPS OPHTHALMIC at 10:12

## 2018-01-01 RX ADMIN — ACETAMINOPHEN 650 MG: 325 TABLET, FILM COATED ORAL at 14:21

## 2018-01-01 RX ADMIN — AMPICILLIN SODIUM 2000 MG: 2 INJECTION, POWDER, FOR SOLUTION INTRAMUSCULAR; INTRAVENOUS at 03:26

## 2018-01-01 RX ADMIN — EPINEPHRINE 1 MG: 0.1 INJECTION, SOLUTION ENDOTRACHEAL; INTRACARDIAC; INTRAVENOUS at 01:18

## 2018-01-01 RX ADMIN — OFLOXACIN 1 DROP: 3 SOLUTION/ DROPS OPHTHALMIC at 13:17

## 2018-01-01 RX ADMIN — MENTHOL, METHYL SALICYLATE 1 APPLICATION: 10; 15 CREAM TOPICAL at 18:35

## 2018-01-01 RX ADMIN — HYDRALAZINE HYDROCHLORIDE 25 MG: 25 TABLET ORAL at 17:20

## 2018-01-01 RX ADMIN — AMPICILLIN SODIUM 2000 MG: 2 INJECTION, POWDER, FOR SOLUTION INTRAMUSCULAR; INTRAVENOUS at 15:49

## 2018-01-01 RX ADMIN — METOPROLOL TARTRATE 12.5 MG: 25 TABLET ORAL at 17:55

## 2018-01-01 RX ADMIN — AMPICILLIN SODIUM 2000 MG: 2 INJECTION, POWDER, FOR SOLUTION INTRAMUSCULAR; INTRAVENOUS at 22:38

## 2018-01-01 RX ADMIN — HYDRALAZINE HYDROCHLORIDE 25 MG: 25 TABLET ORAL at 18:01

## 2018-01-01 RX ADMIN — ONDANSETRON 4 MG: 2 INJECTION INTRAMUSCULAR; INTRAVENOUS at 18:00

## 2018-01-01 RX ADMIN — PHYTONADIONE 10 MG: 10 INJECTION, EMULSION INTRAMUSCULAR; INTRAVENOUS; SUBCUTANEOUS at 11:20

## 2018-01-01 RX ADMIN — HYDRALAZINE HYDROCHLORIDE 25 MG: 25 TABLET ORAL at 09:12

## 2018-01-01 RX ADMIN — DEXTROSE MONOHYDRATE 50 ML: 25 INJECTION, SOLUTION INTRAVENOUS at 00:45

## 2018-01-01 RX ADMIN — NEPHROCAP 1 CAPSULE: 1 CAP ORAL at 17:50

## 2018-01-01 RX ADMIN — HYDRALAZINE HYDROCHLORIDE 25 MG: 25 TABLET ORAL at 20:24

## 2018-01-01 RX ADMIN — AMPICILLIN SODIUM 2000 MG: 2 INJECTION, POWDER, FOR SOLUTION INTRAMUSCULAR; INTRAVENOUS at 00:52

## 2018-01-01 RX ADMIN — FUROSEMIDE 80 MG: 10 INJECTION, SOLUTION INTRAMUSCULAR; INTRAVENOUS at 20:49

## 2018-01-01 RX ADMIN — PREDNISOLONE ACETATE 1 DROP: 10 SUSPENSION/ DROPS OPHTHALMIC at 21:22

## 2018-01-01 RX ADMIN — PANTOPRAZOLE SODIUM 40 MG: 40 INJECTION, POWDER, FOR SOLUTION INTRAVENOUS at 10:02

## 2018-01-01 RX ADMIN — INSULIN HUMAN 10 UNITS: 100 INJECTION, SOLUTION PARENTERAL at 00:47

## 2018-01-01 RX ADMIN — PREDNISOLONE ACETATE 1 DROP: 10 SUSPENSION/ DROPS OPHTHALMIC at 10:13

## 2018-01-01 RX ADMIN — FUROSEMIDE 20 MG: 20 TABLET ORAL at 18:02

## 2018-01-01 RX ADMIN — FUROSEMIDE 80 MG: 10 INJECTION, SOLUTION INTRAMUSCULAR; INTRAVENOUS at 01:39

## 2018-01-01 RX ADMIN — IRON SUCROSE 300 MG: 20 INJECTION, SOLUTION INTRAVENOUS at 10:10

## 2018-01-01 RX ADMIN — CALCIUM CHLORIDE 1 G: 100 INJECTION PARENTERAL at 00:19

## 2018-01-01 RX ADMIN — METOPROLOL TARTRATE 12.5 MG: 25 TABLET ORAL at 10:05

## 2018-01-01 RX ADMIN — PREDNISOLONE ACETATE 1 DROP: 10 SUSPENSION/ DROPS OPHTHALMIC at 13:17

## 2018-01-01 RX ADMIN — PREDNISOLONE ACETATE 1 DROP: 10 SUSPENSION/ DROPS OPHTHALMIC at 18:02

## 2018-01-01 RX ADMIN — OFLOXACIN 1 DROP: 3 SOLUTION/ DROPS OPHTHALMIC at 17:29

## 2018-01-01 RX ADMIN — Medication 20000 ML: at 07:22

## 2018-01-01 RX ADMIN — VANCOMYCIN HYDROCHLORIDE 1000 MG: 1 INJECTION, SOLUTION INTRAVENOUS at 18:29

## 2018-01-01 RX ADMIN — PHYTONADIONE 5 MG: 5 TABLET ORAL at 16:40

## 2018-01-01 RX ADMIN — Medication 20000 ML: at 00:51

## 2018-01-01 RX ADMIN — FUROSEMIDE 20 MG: 20 TABLET ORAL at 09:12

## 2018-01-01 RX ADMIN — AMPICILLIN SODIUM 2000 MG: 2 INJECTION, POWDER, FOR SOLUTION INTRAMUSCULAR; INTRAVENOUS at 22:22

## 2018-01-01 RX ADMIN — HYDRALAZINE HYDROCHLORIDE 25 MG: 25 TABLET ORAL at 10:13

## 2018-01-01 RX ADMIN — LORAZEPAM 2 MG: 2 INJECTION INTRAMUSCULAR; INTRAVENOUS at 03:07

## 2018-01-01 RX ADMIN — DOCUSATE SODIUM 100 MG: 100 CAPSULE, LIQUID FILLED ORAL at 08:10

## 2018-01-01 RX ADMIN — EPINEPHRINE 1 MG: 0.1 INJECTION, SOLUTION ENDOTRACHEAL; INTRACARDIAC; INTRAVENOUS at 23:45

## 2018-01-01 RX ADMIN — ATROPINE SULFATE 1 MG: 0.1 INJECTION, SOLUTION ENDOTRACHEAL; INTRAMUSCULAR; INTRAVENOUS; SUBCUTANEOUS at 23:48

## 2018-01-01 RX ADMIN — PREDNISOLONE ACETATE 1 DROP: 10 SUSPENSION/ DROPS OPHTHALMIC at 08:18

## 2018-01-01 RX ADMIN — DIAZEPAM 5 MG: 5 TABLET ORAL at 22:37

## 2018-01-01 RX ADMIN — ACETAMINOPHEN 650 MG: 650 SUPPOSITORY RECTAL at 11:35

## 2018-01-01 RX ADMIN — ACETAMINOPHEN 650 MG: 325 TABLET, FILM COATED ORAL at 17:20

## 2018-01-01 RX ADMIN — APIXABAN 5 MG: 5 TABLET, FILM COATED ORAL at 08:10

## 2018-01-01 RX ADMIN — FUROSEMIDE 40 MG: 10 INJECTION, SOLUTION INTRAMUSCULAR; INTRAVENOUS at 17:25

## 2018-01-01 RX ADMIN — NOREPINEPHRINE BITARTRATE 2 MCG/MIN: 1 INJECTION INTRAVENOUS at 20:38

## 2018-01-01 RX ADMIN — POLYETHYLENE GLYCOL 3350 17 G: 17 POWDER, FOR SOLUTION ORAL at 17:50

## 2018-01-01 RX ADMIN — DOCUSATE SODIUM 100 MG: 100 CAPSULE, LIQUID FILLED ORAL at 08:46

## 2018-01-01 RX ADMIN — ACETAMINOPHEN 650 MG: 325 TABLET, FILM COATED ORAL at 20:51

## 2018-01-01 RX ADMIN — PREDNISOLONE ACETATE 1 DROP: 10 SUSPENSION/ DROPS OPHTHALMIC at 13:13

## 2018-01-01 RX ADMIN — DOCUSATE SODIUM 100 MG: 100 CAPSULE, LIQUID FILLED ORAL at 18:12

## 2018-01-01 RX ADMIN — SODIUM BICARBONATE 50 MEQ: 84 INJECTION, SOLUTION INTRAVENOUS at 23:48

## 2018-01-01 RX ADMIN — PREDNISOLONE ACETATE 1 DROP: 10 SUSPENSION/ DROPS OPHTHALMIC at 18:06

## 2018-01-01 RX ADMIN — OFLOXACIN 1 DROP: 3 SOLUTION/ DROPS OPHTHALMIC at 22:53

## 2018-01-01 RX ADMIN — KETOROLAC TROMETHAMINE 1 DROP: 5 SOLUTION OPHTHALMIC at 22:53

## 2018-01-01 RX ADMIN — KETOROLAC TROMETHAMINE 1 DROP: 5 SOLUTION OPHTHALMIC at 18:38

## 2018-01-01 RX ADMIN — PREDNISOLONE ACETATE 1 DROP: 10 SUSPENSION/ DROPS OPHTHALMIC at 22:26

## 2018-01-01 RX ADMIN — IRON SUCROSE 300 MG: 20 INJECTION, SOLUTION INTRAVENOUS at 13:16

## 2018-01-01 RX ADMIN — DIAZEPAM 5 MG: 5 TABLET ORAL at 02:57

## 2018-01-01 RX ADMIN — ACETAMINOPHEN 650 MG: 325 TABLET, FILM COATED ORAL at 13:10

## 2018-01-01 RX ADMIN — ACETAMINOPHEN 650 MG: 325 TABLET, FILM COATED ORAL at 13:40

## 2018-01-01 RX ADMIN — AMPICILLIN SODIUM 2000 MG: 2 INJECTION, POWDER, FOR SOLUTION INTRAMUSCULAR; INTRAVENOUS at 08:16

## 2018-01-01 RX ADMIN — HYDRALAZINE HYDROCHLORIDE 25 MG: 25 TABLET ORAL at 17:50

## 2018-01-01 RX ADMIN — DEXTROSE MONOHYDRATE 50 G: 500 INJECTION PARENTERAL at 23:50

## 2018-01-01 RX ADMIN — AMPICILLIN SODIUM 2000 MG: 2 INJECTION, POWDER, FOR SOLUTION INTRAMUSCULAR; INTRAVENOUS at 02:26

## 2018-01-01 RX ADMIN — PREDNISOLONE ACETATE 1 DROP: 10 SUSPENSION/ DROPS OPHTHALMIC at 12:06

## 2018-01-01 RX ADMIN — EPINEPHRINE 1 MG: 0.1 INJECTION, SOLUTION ENDOTRACHEAL; INTRACARDIAC; INTRAVENOUS at 23:50

## 2018-08-18 PROBLEM — N17.9 AKI (ACUTE KIDNEY INJURY) (HCC): Status: ACTIVE | Noted: 2018-01-01

## 2018-08-18 PROBLEM — R77.8 ELEVATED TROPONIN: Status: ACTIVE | Noted: 2018-01-01

## 2018-08-18 PROBLEM — R79.1 SUPRATHERAPEUTIC INR: Status: ACTIVE | Noted: 2018-01-01

## 2018-08-18 PROBLEM — Z91.81 HISTORY OF RECENT FALL: Status: ACTIVE | Noted: 2018-01-01

## 2018-08-18 PROBLEM — G93.40 ENCEPHALOPATHY: Status: ACTIVE | Noted: 2018-01-01

## 2018-08-18 PROBLEM — R09.02 HYPOXIA: Status: ACTIVE | Noted: 2018-01-01

## 2018-08-18 NOTE — Clinical Note
Case was discussed with Dr Keila Horvath and the patient's admission status was agreed to be Admission Status: inpatient status to the service of Dr Keila Horvath

## 2018-08-18 NOTE — ED PROVIDER NOTES
History  Chief Complaint   Patient presents with    Leg Swelling     Pt presents to ED from home w/ bilateral leg swelling, and arm swelling, with weeping  Pt (+) extensive health hx  Pt d/c'ed from hospital 4 days ago from falling  History provided by:  Patient  Altered Mental Status   Presenting symptoms: confusion, disorientation and partial responsiveness    Severity:  Moderate  Most recent episode:  More than 2 days ago  Episode history:  Continuous  Duration:  4 days  Timing:  Constant  Chronicity:  New  Context: dementia    Context comment:  Daughter just pick patient up from [place], she was admitted to hospital after a fall supposed to be on oxygen, oxygen ran out retirement in car trip to [place], increasing confusion, increasing weeping from arms and legs  Associated symptoms: no abdominal pain, no fever, no headaches, no light-headedness, no nausea, no palpitations, no rash and no vomiting    Associated symptoms comment:  Increasing lower extremity and upper extremity edema, weeping from arms and legs  Prior to Admission Medications   Prescriptions Last Dose Informant Patient Reported? Taking?    albuterol (ACCUNEB) 1 25 MG/3ML nebulizer solution 8/18/2018 at Unknown time  Yes Yes   Sig: Take 1 ampule by nebulization every 4 (four) hours as needed for wheezing   albuterol (PROVENTIL HFA,VENTOLIN HFA) 90 mcg/act inhaler Unknown at Unknown time  Yes No   Sig: Inhale 2 puffs every 4 (four) hours as needed for wheezing   apixaban (ELIQUIS) 5 mg 8/18/2018 at Unknown time  Yes Yes   Sig: Take 5 mg by mouth 2 (two) times a day   furosemide (LASIX) 20 mg tablet 8/18/2018 at Unknown time  Yes Yes   Sig: Take 20 mg by mouth 2 (two) times a day   glipiZIDE (GLUCOTROL) 5 mg tablet 8/18/2018 at Unknown time  Yes Yes   Sig: Take 2 5 mg by mouth daily before breakfast   hydrALAZINE (APRESOLINE) 25 mg tablet 8/18/2018 at Unknown time  Yes Yes   Sig: Take 25 mg by mouth 3 (three) times a day   ketorolac (ACULAR) 0 5 % ophthalmic solution 8/18/2018 at Unknown time  Yes Yes   Sig: Administer 1 drop into the left eye 4 (four) times a day   ofloxacin (OCUFLOX) 0 3 % ophthalmic solution 8/18/2018 at Unknown time  Yes Yes   Sig: Administer 1 drop into the left eye 4 (four) times a day   prednisoLONE acetate (PRED FORTE) 1 % ophthalmic suspension 8/18/2018 at Unknown time  Yes Yes   Sig: Administer 1 drop into the left eye 4 (four) times a day      Facility-Administered Medications: None       Past Medical History:   Diagnosis Date    Cardiac disease     Hypertension        Past Surgical History:   Procedure Laterality Date    CORONARY ANGIOPLASTY WITH STENT PLACEMENT         History reviewed  No pertinent family history  I have reviewed and agree with the history as documented  Social History   Substance Use Topics    Smoking status: Former Smoker    Smokeless tobacco: Never Used    Alcohol use No        Review of Systems   Constitutional: Negative for activity change, chills, diaphoresis and fever  HENT: Negative for congestion, sinus pressure and sore throat  Eyes: Negative for pain and visual disturbance  Respiratory: Negative for cough, chest tightness, shortness of breath, wheezing and stridor  Cardiovascular: Negative for chest pain and palpitations  Gastrointestinal: Negative for abdominal distention, abdominal pain, constipation, diarrhea, nausea and vomiting  Genitourinary: Negative for dysuria and frequency  Musculoskeletal: Negative for neck pain and neck stiffness  Skin: Negative for rash  Neurological: Negative for dizziness, speech difficulty, light-headedness, numbness and headaches  Psychiatric/Behavioral: Positive for confusion  Physical Exam  Physical Exam   Constitutional: She appears well-developed  She appears distressed  HENT:   Head: Normocephalic and atraumatic  Eyes: Pupils are equal, round, and reactive to light  Neck: Normal range of motion   Neck supple  No tracheal deviation present  Cardiovascular: Regular rhythm, normal heart sounds and intact distal pulses  No murmur heard  Bradycardic, heart rate around 50   Pulmonary/Chest: No stridor  She is in respiratory distress  At my initial time of examination, pulse ox mid 80s on 6 L nasal cannula, throughout my history and physical taking, slowly increase to mid 90s  , now back down to 4 L nasal cannula  Decreased breath sounds bilateral bases  Mildly elevated Respiratory rate 18-20   Abdominal: Soft  She exhibits no distension  There is no tenderness  There is no rebound and no guarding  Musculoskeletal: Normal range of motion  She exhibits edema  Neurological: She is alert  Disorientated to time and place  Nonfocal neurological examination but very slow to respond to questions  Answer some questions inappropriately  Skin: Skin is warm and dry  She is not diaphoretic  No erythema  No pallor  Psychiatric: She has a normal mood and affect  Vitals reviewed        Vital Signs  ED Triage Vitals   Temperature Pulse Respirations Blood Pressure SpO2   08/18/18 1625 08/18/18 1625 08/18/18 1625 08/18/18 1625 08/18/18 1625   (!) 97 4 °F (36 3 °C) 60 14 148/64 (!) 82 %      Temp Source Heart Rate Source Patient Position - Orthostatic VS BP Location FiO2 (%)   08/18/18 1625 08/18/18 1625 08/18/18 1625 08/18/18 1625 --   Rectal Monitor Lying Right arm       Pain Score       08/18/18 1749       No Pain           Vitals:    08/18/18 1749 08/18/18 1800 08/18/18 1900 08/18/18 1915   BP: 133/60 126/59 130/58 113/54   Pulse: (!) 54 (!) 54 (!) 52 (!) 52   Patient Position - Orthostatic VS: Lying  Lying        Visual Acuity      ED Medications  Medications   phytonadione (AQUA-MEPHYTON) 10 mg/mL 5 mg in sodium chloride 0 9 % 50 mL IVPB (not administered)   sodium chloride 0 9 % bolus 250 mL (0 mL Intravenous Stopped 8/18/18 1857)       Diagnostic Studies  Results Reviewed     Procedure Component Value Units Date/Time    Ammonia [[id number]]     Lab Status:  No result Specimen:  Blood     Basic metabolic panel [[id number]]  (Abnormal) Collected:  08/18/18 1745    Lab Status:  Final result Specimen:  Blood from Vein Updated:  08/18/18 1849     Sodium 142 mmol/L      Potassium 5 3 mmol/L      Chloride 109 (H) mmol/L      CO2 22 mmol/L      Anion Gap 11 mmol/L      BUN 51 (H) mg/dL      Creatinine 2 70 (H) mg/dL      Glucose 155 (H) mg/dL      Calcium 8 3 mg/dL      eGFR 17 ml/min/1 73sq m     Narrative:         National Kidney Disease Education Program recommendations are as follows:  GFR calculation is accurate only with a steady state creatinine  Chronic Kidney disease less than 60 ml/min/1 73 sq  meters  Kidney failure less than 15 ml/min/1 73 sq  meters  Hepatic function panel [[id number]]  (Abnormal) Collected:  08/18/18 1745    Lab Status:  Final result Specimen:  Blood from Vein Updated:  08/18/18 1849     Total Bilirubin 1 80 (H) mg/dL      Bilirubin, Direct 1 20 (H) mg/dL      Alkaline Phosphatase 216 (H) U/L       (H) U/L       (H) U/L      Total Protein 6 0 (L) g/dL      Albumin 2 7 (L) g/dL     B-type natriuretic peptide [[id number]]  (Abnormal) Collected:  08/18/18 1745    Lab Status:  Final result Specimen:  Blood from Vein Updated:  08/18/18 1849     NT-proBNP 118,415 (H) pg/mL     Urine Microscopic [[id number]]  (Abnormal) Collected:  08/18/18 1811    Lab Status:  Final result Specimen:  Urine from Urine, Straight Cath Updated:  08/18/18 1835     RBC, UA 10-20 (A) /hpf      WBC, UA 30-50 (A) /hpf      Epithelial Cells Occasional /hpf      Bacteria, UA Innumerable (A) /hpf      Fine granular casts 1-2 /lpf     Urine culture [[id number]] Collected:  08/18/18 1811    Lab Status:   In process Specimen:  Urine from Urine, Straight Cath Updated:  08/18/18 1835    Lactic acid, plasma [[id number]]  (Abnormal) Collected:  08/18/18 1745    Lab Status:  Final result Specimen:  Blood from Vein Updated: 08/18/18 1829     LACTIC ACID 2 2 (HH) mmol/L     Narrative:         Result may be elevated if tourniquet was used during collection  APTT [[id number]]  (Abnormal) Collected:  08/18/18 1745    Lab Status:  Final result Specimen:  Blood from Vein Updated:  08/18/18 1822     PTT 45 (H) seconds     Protime-INR [[id number]]  (Abnormal) Collected:  08/18/18 1745    Lab Status:  Final result Specimen:  Blood from Vein Updated:  08/18/18 1822     Protime 49 4 (H) seconds      INR 5 65 (HH)    Narrative:       Verified by repeat  UA w Reflex to Microscopic w Reflex to Culture [[id number]]  (Abnormal) Collected:  08/18/18 1811    Lab Status:  Final result Specimen:  Urine from Urine, Straight Cath Updated:  08/18/18 1821     Color, UA Yellow     Clarity, UA Slightly Cloudy     Specific Gravity, UA 1 025     pH, UA 5 0     Leukocytes, UA Moderate (A)     Nitrite, UA Negative     Protein, UA 30 (1+) (A) mg/dl      Glucose, UA Negative mg/dl      Ketones, UA Negative mg/dl      Urobilinogen, UA 0 2 E U /dl      Bilirubin, UA Negative     Blood, UA Small (A)    TSH [[id number]]  (Abnormal) Collected:  08/18/18 1745    Lab Status:  Final result Specimen:  Blood from Vein Updated:  08/18/18 1820     TSH 3RD GENERATON 5 484 (H) uIU/mL     Narrative:         Patients undergoing fluorescein dye angiography may retain small amounts of fluorescein in the body for 48-72 hours post procedure  Samples containing fluorescein can produce falsely depressed TSH values  If the patient had this procedure,a specimen should be resubmitted post fluorescein clearance            The recommended reference ranges for TSH during pregnancy are as follows:  First trimester 0 1 to 2 5 uIU/mL  Second trimester  0 2 to 3 0 uIU/mL  Third trimester 0 3 to 3 0 uIU/m      Troponin I [[id number]]  (Abnormal) Collected:  08/18/18 1745    Lab Status:  Final result Specimen:  Blood from Vein Updated:  08/18/18 1814     Troponin I 0 96 (H) ng/mL     Fingerstick Glucose (POCT) [[id number]]  (Normal) Collected:  08/18/18 1634    Lab Status:  Final result Updated:  08/18/18 1802     POC Glucose 114 mg/dl     CBC and differential [[id number]]  (Abnormal) Collected:  08/18/18 1745    Lab Status:  Final result Specimen:  Blood from Vein Updated:  08/18/18 1756     WBC 9 66 Thousand/uL      RBC 3 36 (L) Million/uL      Hemoglobin 8 1 (L) g/dL      Hematocrit 28 4 (L) %      MCV 85 fL      MCH 24 1 (L) pg      MCHC 28 5 (L) g/dL      RDW 20 4 (H) %      MPV 10 6 fL      Platelets 740 Thousands/uL      nRBC 4 /100 WBCs      Neutrophils Relative 82 (H) %      Immat GRANS % 1 %      Lymphocytes Relative 9 (L) %      Monocytes Relative 8 %      Eosinophils Relative 0 %      Basophils Relative 0 %      Neutrophils Absolute 7 89 (H) Thousands/µL      Immature Grans Absolute 0 06 Thousand/uL      Lymphocytes Absolute 0 91 Thousands/µL      Monocytes Absolute 0 79 Thousand/µL      Eosinophils Absolute 0 00 Thousand/µL      Basophils Absolute 0 01 Thousands/µL     POCT Blood Gas (CG8+) [[id number]]  (Abnormal) Collected:  08/18/18 1704    Lab Status:  Final result Updated:  08/18/18 1710     pH, Art i-STAT 7 356     pCO2, Art i-STAT 37 6 mm HG      pO2, ART i-STAT 194 0 (H) mm HG      BE, i-STAT -4 (L) mmol/L      HCO3, Art i-STAT 21 0 (L) mmol/L      CO2, i-STAT 22 mmol/L      O2 Sat, i-STAT 100 (H) %      SODIUM, I-STAT 142 mmol/l      Potassium, i-STAT 4 9 mmol/L      Calcium, Ionized i-STAT 1 16 mmol/L      Hct, i-STAT 27 (L) %      Hgb, i-STAT 9 2 (L) g/dl      Glucose, i-STAT 146 (H) mg/dl      POC FIO2 44 L      Specimen Type ARTERIAL     Delivery System Nasal Cannula     SITE Right Radial     LAITH TEST Postive Laith Test    Blood gas, arterial [[id number]]     Lab Status:  No result Specimen:  Blood, Arterial                  XR chest 1 view portable   ED Interpretation by Dawson Hahn DO (08/18 1726)   Cardiomegaly small left-sided pleural effusion      US liver    (Results Pending)   CT head wo contrast    (Results Pending)              Procedures  ECG 12 Lead Documentation  Date/Time: 8/18/2018 6:28 PM  Performed by: Martin Chávez by: Miles Dumas     ECG reviewed by me, the ED Provider: yes    Patient location:  ED  Interpretation:     Interpretation: non-specific    Rate:     ECG rate:  56    ECG rate assessment: bradycardic    Rhythm:     Rhythm: sinus rhythm    Ectopy:     Ectopy: none    QRS:     QRS axis:  Left    QRS intervals:  Normal  Conduction:     Conduction: normal    ST segments:     ST segments:  Non-specific  T waves:     T waves: non-specific             Phone Contacts  ED Phone Contact    ED Course  ED Course as of Aug 18 2005   Sat Aug 18, 2018   1740 Physician placed IV:  Multiple attempts by nursing staff for peripheral IV placement unsuccessful  I placed a 20-gauge IV in the   right external jugular vein  Area of skin was prepped with chlorhexidine prior to IV insertion  Patient tolerated procedure well  No complications  IV flushed without local infiltration or pain  1740 Repeat evaluation, large amount of improvement, patient more alert, not as confused  , making me believe most of this is related to her hypoxia, not necessarily due to infection  Initial Sepsis Screening     Row Name 08/18/18 1913 08/18/18 1647             Is the patient's history suggestive of a new or worsening infection? No  -EP (!)  Yes (Proceed)  -DA       Suspected source of infection   suspect infection, source unknown  -DA       Are two or more of the following signs & symptoms of infection both present and new to the patient?  No  -EP         Indicate SIRS criteria   Altered mental status  -DA       If the answer is yes to both questions, suspicion of sepsis is present  [de-identified]         If severe sepsis is present AND tissue hypoperfusion perists in the hour after fluid resuscitation or lactate > 4, the patient meets criteria for SEPTIC SHOCK     Are any of the following organ dysfunction criteria present within 6 hours of suspected infection and SIRS criteria that are NOT considered to be chronic conditions?          Organ dysfunction           Date of presentation of severe sepsis           Time of presentation of severe sepsis           Tissue hypoperfusion persists in the hour after crystalloid fluid administration, evidenced, by either:           Was hypotension present within one hour of the conclusion of crystalloid fluid administration? Chris Mandujano       Date of presentation of septic shock           Time of presentation of septic shock             User Key  (r) = Recorded By, (t) = Taken By, (c) = Cosigned By    [address] Name Provider Type    SHADI Palomares DO Physician    PARIS Madison MD Physician                  MDM  Number of Diagnoses or Management Options  Acute renal failure (ARF) ([facility] [address] ): new and requires workup  Altered mental status: new and requires workup  Elevated troponin I level: new and requires workup  Hypoxia: new and requires workup  Supratherapeutic INR: new and requires workup  Diagnosis management comments:       Initial ED assessment:  82-year-old female just discharged from hospital in [place] found to be hypoxic, altered, supposed to be on oxygen but ran out long-term through the trip  Patient confused more than baseline mild hypothermia will core temp of 97 4°    Initial DDx includes but is not limited to: Altered mental status from hypoxia due to being off oxygen for number of days, infectious etiology, UTI, pneumonia  , fluid overload/Congestive heart failure  , acute renal failure  No history to suggest trauma or intracranial pathology  Initial ED plan:  Blood work, straight cath urine, blood cultures, chest x-ray, initially plan was to start patient on high-flow oxygen but her oxygen corrected with nasal cannula, was hypoxic now mid 90s on 4 L    Will check an ABG, patient will require admission to hospital         Final ED summary/disposition:   After evaluation and workup in the emergency department, found to be in acute renal failure, elevated troponin, hypoxia improved with nasal cannula oxygen  , admit the hospital for further workup and evaluation  Amount and/or Complexity of Data Reviewed  Clinical lab tests: ordered and reviewed  Tests in the radiology section of CPT®: ordered and reviewed  Decide to obtain previous medical records or to obtain history from someone other than the patient: yes  Obtain history from someone other than the patient: yes  Review and summarize past medical records: yes  Discuss the patient with other providers: yes  Independent visualization of images, tracings, or specimens: yes      The patient presented with a condition in which there was a high probability of imminent or life-threatening deterioration, and critical care services (excluding separately billable procedures) totalled 30-74 minutes  Disposition  Final diagnoses:   Acute renal failure (ARF) (HCC)   Elevated troponin I level   Hypoxia   Altered mental status   Supratherapeutic INR     Time reflects when diagnosis was documented in both MDM as applicable and the Disposition within this note     Time User Action Codes Description Comment    8/18/2018  6:28 PM Joe Mckenzie [N17 9] Acute renal failure (ARF) ([address] )     8/18/2018  6:28 PM Iveth Ojeda [R74 8] Elevated troponin I level     8/18/2018  6:29 PM Iveth Ojeda [R09 02] Hypoxia     8/18/2018  6:29 PM Joe Mckenzie [R41 82] Altered mental status     8/18/2018  6:29 PM Joe Mckenzie [R79 1] Supratherapeutic INR       ED Disposition     ED Disposition Condition Comment    Admit  Case was discussed with Dr Elvis Best and the patient's admission status was agreed to be Admission Status: inpatient status to the service of Dr Elvis Best             Follow-up Information    None         Patient's Medications Discharge Prescriptions    No medications on file     No discharge procedures on file      ED Provider  Electronically Signed by           Codi Florez DO  08/18/18 2005

## 2018-08-18 NOTE — ASSESSMENT & PLAN NOTE
As per daughter patient is AAOx3  She is not responding at bedside when I try to speak to her  Does not respond to sternal rub  Will check ammonia  Will check liver US (liver enzymes likely elevated to congestion)  CT brain as above  Neuro checks  Will admit to critical care

## 2018-08-18 NOTE — H&P
H&P- Trice May 1940, 70 y o  female MRN: [id number]    Unit/Bed#: ED 08 Encounter: [id number]    Primary Care Provider: No primary care provider on file  Date and time admitted to hospital: 8/18/2018  4:17 PM        Encephalopathy   Assessment & Plan    As per daughter patient is AAOx3  She is not responding at bedside when I try to speak to her  Does not respond to sternal rub  Will check ammonia  Will check liver US (liver enzymes likely elevated to congestion)  CT brain as above  Neuro checks  Will admit to critical care  Hypoxia   Assessment & Plan    Patient requiring O2 nasal canula  CXR- congestion  Will diurese  Discussed with Critical care AP  Patient will go to step down level 2 for now  Unclear what her baseline is and what her clinical course will be overnight  Also patient is Full Code- confirmed with daughter at bedside  Elevated troponin   Assessment & Plan    Echo  Serial troponins  Will monitor on telemetry  SANTI (acute kidney injury) [facility])   Assessment & Plan    Patient has only one kidney  Unclear at this time what her baseline is  Will attempt diuresis as her volume status on exam is consistent with volume overload  Lasix 20 mg IV and consult nephrology and cardiology  Echo  Supratherapeutic INR   Assessment & Plan    Will reverse INR as I am not clear of her bleeding status at this time  Vitamin K   CT brain  * History of recent fall   Assessment & Plan    Patient has large bruise/hematology  Will check CT brain for now  Supratherpeutic INR  VTE Prophylaxis: INR is supratherapeutic    / sequential compression device   Code Status: Full Code  POLST: There is no POLST form on file for this patient (pre-hospital)  Discussion with family: discussed with patient's daughter       Anticipated Length of Stay:  Patient will be admitted on an Inpatient basis with an anticipated length of stay of  More than 2 midnights  Justification for Hospital Stay: Encephalopathy  Total Time for Visit, including Counseling / Coordination of Care: 1 hour  Greater than 50% of this total time spent on direct patient counseling and coordination of care  Chief Complaint:     As per daughter patient is confused and somnolent relative to baseline  History of Present Illness:    Dileep Childers is a 70 y o  female who presents with altered mental status and difficulty breathing  She is unable to give me a history secondary to altered mental status, and her daughter is at bedside and is attempting to give a collateral history  Unfortunately the daughter is a poor historian as well  As per the daughter the patient was recently at a hospital in [place] where she was admitted for fall  The patient has visible bruising on the right side of her face to evidence this  She was discharged from this hospital on Thursday and was staying with her daughter in [place], up until her other daughter who lives here and [facility], who is currently here at bedside, drove to pick her up and bring her back to [facility]  As as per the daughter was at bedside, the patient had been started on 3 new heart medications prior to her fall, and had a heart rate in the 30s when she was admitted to hospital last week  The patient is apparently alert and oriented x3 with only mild short-term memory issues at baseline, however when she was discharged from hospital on Thursday, she was not herself as per her daughter  As per the patient's daughter she drove up from [place] today with her mother with the intent to have her stay with her going forward, but noted that her mother was becoming increasingly short of breath and more confused in the car, so she brought her into the emergency department here at [facility]      The patient does have a history of heart failure, and does appear volume overloaded at bedside with elevated JVP bilateral pitting edema on both lower extremities and some vascular congestion seen on chest x-ray  BNP is 118,000 troponin is elevated 0 96  In addition LFTs are elevated in INR is elevated as well  The patient's daughter states that she has 1 kidney, and her baseline creatinine is unknown, today creatinine is 2 7  Review of Systems:    Review of Systems   Unable to perform ROS: Mental status change       Past Medical and Surgical History:     Past Medical History:   Diagnosis Date    Cardiac disease     Hypertension        Past Surgical History:   Procedure Laterality Date    CORONARY ANGIOPLASTY WITH STENT PLACEMENT         Meds/Allergies:    Prior to Admission medications    Medication Sig Start Date End Date Taking?  Authorizing Provider   albuterol (ACCUNEB) 1 25 MG/3ML nebulizer solution Take 1 ampule by nebulization every 4 (four) hours as needed for wheezing   Yes Historical Provider, MD   apixaban (ELIQUIS) 5 mg Take 5 mg by mouth 2 (two) times a day   Yes Historical Provider, MD   furosemide (LASIX) 20 mg tablet Take 20 mg by mouth 2 (two) times a day   Yes Historical Provider, MD   glipiZIDE (GLUCOTROL) 5 mg tablet Take 2 5 mg by mouth daily before breakfast   Yes Historical Provider, MD   hydrALAZINE (APRESOLINE) 25 mg tablet Take 25 mg by mouth 3 (three) times a day   Yes Historical Provider, MD   ketorolac (ACULAR) 0 5 % ophthalmic solution Administer 1 drop into the left eye 4 (four) times a day   Yes Historical Provider, MD   ofloxacin (OCUFLOX) 0 3 % ophthalmic solution Administer 1 drop into the left eye 4 (four) times a day   Yes Historical Provider, MD   prednisoLONE acetate (PRED FORTE) 1 % ophthalmic suspension Administer 1 drop into the left eye 4 (four) times a day   Yes Historical Provider, MD   albuterol (PROVENTIL HFA,VENTOLIN HFA) 90 mcg/act inhaler Inhale 2 puffs every 4 (four) hours as needed for wheezing    Historical Provider, MD     I have reviewed home medications with patient personally  Allergies: No Known Allergies    Social History:     Marital Status: Single   Occupation: retired  Patient Pre-hospital Living Situation: lives with daughter  Patient Pre-hospital Level of Mobility: fully mobile  Patient Pre-hospital Diet Restrictions: none  Substance Use History:   History   Alcohol Use No     History   Smoking Status    Former Smoker   Smokeless Tobacco    Never Used     History   Drug Use No       Family History:    History reviewed  No pertinent family history  Physical Exam:     Vitals:   Blood Pressure: 113/54 (08/18/18 1915)  Pulse: (!) 52 (08/18/18 1915)  Temperature: (!) 97 4 °F (36 3 °C) (08/18/18 1625)  Temp Source: Rectal (08/18/18 1625)  Respirations: 16 (08/18/18 1900)  Height: 5' 4" (162 6 cm) (08/18/18 1625)  Weight - Scale: 72 6 kg (160 lb 0 9 oz) (08/18/18 1635)  SpO2: 100 % (08/18/18 1915)    Physical Exam   Constitutional: She appears distressed  HENT:   Head: Normocephalic  Facial bruising  Neck: JVD present  No tracheal deviation present  No thyromegaly present  Cardiovascular: Exam reveals no gallop and no friction rub  Murmur heard  Pulmonary/Chest: She is in respiratory distress  She has wheezes  She has no rales  She exhibits no tenderness  Abdominal: She exhibits no distension and no mass  There is no tenderness  There is no rebound and no guarding  Musculoskeletal: She exhibits edema  She exhibits no tenderness  Lymphadenopathy:     She has no cervical adenopathy  Neurological: She displays normal reflexes  No cranial nerve deficit  She exhibits normal muscle tone  Coordination normal    Skin: No rash noted  No erythema  There is pallor  Psychiatric: She has a normal mood and affect  Additional Data:     Lab Results: I have personally reviewed pertinent reports          Results from last 7 days  Lab Units 08/18/18  1745   WBC Thousand/uL 9 66   HEMOGLOBIN g/dL 8 1*   HEMATOCRIT % 28 4* PLATELETS Thousands/uL 291   NEUTROS PCT % 82*   LYMPHS PCT % 9*   MONOS PCT % 8   EOS PCT % 0       Results from last 7 days  Lab Units 08/18/18  1745   SODIUM mmol/L 142   POTASSIUM mmol/L 5 3   CHLORIDE mmol/L 109*   CO2 mmol/L 22   BUN mg/dL 51*   CREATININE mg/dL 2 70*   CALCIUM mg/dL 8 3   TOTAL PROTEIN g/dL 6 0*   BILIRUBIN TOTAL mg/dL 1 80*   ALK PHOS U/L 216*   ALT U/L 429*   AST U/L 473*   GLUCOSE RANDOM mg/dL 155*       Results from last 7 days  Lab Units 08/18/18  1745   INR  5 65*       Results from last 7 days  Lab Units 08/18/18  1634   POC GLUCOSE mg/dl 114           Imaging: I have personally reviewed pertinent reports  XR chest 1 view portable   ED Interpretation by Yanni Rodriguez DO (08/18 1726)   Cardiomegaly small left-sided pleural effusion      US liver    (Results Pending)   CT head wo contrast    (Results Pending)       EKG, Pathology, and Other Studies Reviewed on Admission:   · EKG: none on chart, will request      [facility]     ** Please Note: This note has been constructed using a voice recognition system   **

## 2018-08-18 NOTE — ASSESSMENT & PLAN NOTE
Patient has only one kidney  Unclear at this time what her baseline is  Will attempt diuresis as her volume status on exam is consistent with volume overload  Lasix 20 mg IV and consult nephrology and cardiology  Echo

## 2018-08-18 NOTE — ASSESSMENT & PLAN NOTE
Patient requiring O2 nasal canula  CXR- congestion  Will diurese  Discussed with Critical care AP  Patient will go to step down level 2 for now  Unclear what her baseline is and what her clinical course will be overnight  Also patient is Full Code- confirmed with daughter at bedside

## 2018-08-19 NOTE — CONSULTS
Inpatient Consultation - Nephrology   Juan Carlos Jacobs 70 y o  female MRN: [id number]  Unit/Bed#: -01 Encounter: [id number]    ASSESSMENT and PLAN:  1  Elevated creatinine  · I am unable to tell if this is SANTI or CKD as we do not have a baseline creatinine to compare with  Will need to check records from [place]  · In any case, creatinine is stable at 2 70 since admission  · Will check baseline work up with UA, UPC ratio  · Will trend creatinine in the hospital    · Has history of solitary functioning kidney ~ will check renal US  2  CHF: Apparently improved and now off IV diuretics  Monitor volume status  Check echocardiogram    3  HTN: BP controlled  4  Encephalopathy: Improved  5  Anemia: Check iron studies  6  Transaminitis: Defer to SLIM  SUMMARY OF RECOMMENDATIONS:  · Trend creatinine  · Monitor off diuretics  · Check records from [place] regarding baseline creatinine  · Check UA, UPC ratio  · Check renal ultrasound  · Check iron studies  · Okay to remove holt catheter  Discussed with daughter and Dr Michelle Hebert  HISTORY OF PRESENT ILLNESS:  Requesting Physician: Bob Clayton MD  Reason for Consult: Renal failure    Juan Carlos Jacobs is a 70 y o  female who was admitted to [facility] on August 18, 2018 after presenting with a change in mental status  A renal consultation is requested today for assistance with management of renal failure  The information contained in this consult was obtained from the patient's daughter who was at the bedside  From what I gather, the patient has a history of hypertension, diabetes mellitus, hyperlipidemia, coronary artery disease/CABG, congestive heart failure  The daughter is unsure about a history of abnormal creatinine readings but seems to recall being told of such in the past   She does recall being told that her mother had 1 functioning kidney  Her baseline creatinine is unclear       The patient was recently admitted to a hospital in [facility] after presenting over there with a fall  From what the daughter is describing, it appears that the patient was bradycardic which led to the fall  Her medications were adjusted during the hospital stay and the patient was discharged  The daughter had been planning to bring the patient over to the [facility] area to live here and she drove down to pick her up on Saturday, 8/18/18  However, on their drive up, the patient apparently became more short of breath and was noted to have a change in mental status prompting her to come directly to [address] [place] [address]  On admission, the patient was found to have a creatinine of 2 7 and a renal consultation was requested due to such  Working diagnosis on admission was congestive heart failure and the patient was treated with intravenous diuretics  Today, the daughter reports that the patient is much better and that the edema is improved and the shortness of breath is resolved  She currently has a holt which was placed her at [facility]  PAST MEDICAL HISTORY:  Past Medical History:   Diagnosis Date    Cardiac disease     Hypertension      PAST SURGICAL HISTORY:  Past Surgical History:   Procedure Laterality Date    CORONARY ANGIOPLASTY WITH STENT PLACEMENT       ALLERGIES:  No Known Allergies    SOCIAL HISTORY:  History   Alcohol Use No     History   Drug Use No     History   Smoking Status    Former Smoker   Smokeless Tobacco    Never Used     FAMILY HISTORY:  (+) for CKD in sister and brother       MEDICATIONS:    Current Facility-Administered Medications:     acetaminophen (TYLENOL) tablet 650 mg, 650 mg, Oral, Q6H PRN, Kaitlyn Brown MD    albuterol (PROVENTIL HFA,VENTOLIN HFA) inhaler 2 puff, 2 puff, Inhalation, Q4H PRN, Kaitlyn Brown MD    haloperidol lactate (HALDOL) injection 2 mg, 2 mg, Intravenous, Once, BETTY Martin, Stopped at 08/18/18 8083    hydrALAZINE (APRESOLINE) tablet 25 mg, 25 mg, Oral, TID, Phoebe Cole MD, 25 mg at 08/18/18 2225    ketorolac (ACULAR) 0 5 % ophthalmic solution 1 drop, 1 drop, Left Eye, 4x Daily, Phobee Cole MD, 1 drop at 08/18/18 2225    ofloxacin (OCUFLOX) 0 3 % ophthalmic solution 1 drop, 1 drop, Left Eye, 4x Daily, Phoebe Cole MD, 1 drop at 08/18/18 2226    prednisoLONE acetate (PRED FORTE) 1 % ophthalmic suspension 1 drop, 1 drop, Left Eye, 4x Daily, Phoebe Cole MD, 1 drop at 08/18/18 2226    REVIEW OF SYSTEMS:  All the systems were reviewed and were negative except as documented on the HPI  PHYSICAL EXAM:  Current Weight: Weight - Scale: 72 5 kg (159 lb 13 3 oz)  First Weight: Weight - Scale: 72 6 kg (160 lb 0 9 oz)  Vitals:    08/19/18 0613 08/19/18 0700 08/19/18 1100 08/19/18 1435   BP:  126/56 120/53 141/63   BP Location:  Right arm Right arm Left arm   Pulse:  (!) 51 (!) 52 55   Resp:  12 12 13   Temp:  97 6 °F (36 4 °C) 97 9 °F (36 6 °C) 97 8 °F (36 6 °C)   TempSrc:  Oral Axillary Oral   SpO2:  100% 100% 99%   Weight: 72 5 kg (159 lb 13 3 oz)   72 5 kg (159 lb 13 3 oz)   Height:    5' 4" (1 626 m)       Intake/Output Summary (Last 24 hours) at 08/19/18 1448  Last data filed at 08/19/18 1401   Gross per 24 hour   Intake              650 ml   Output             1570 ml   Net             -920 ml     Physical Exam  General: conscious, cooperative, not in distress, looks chronically ill  Skin: warm, dry, senile turgor  Eyes: pale conjunctivae  ENT: dry lips and mucous membranes  (+) ecchymoses in the her face  Neck: supple  Chest/Lungs: clear breath sounds  CVS: distinct heart sounds, normal rate, regular rhythm  Abdomen: soft, non-tender, non-distended, normoactive bowel sounds  Extremities: no edema of extremities  : deferred   Neuro: awake, alert  Psych: appropriate affect  Invasive Devices:   Urethral Catheter Latex 14 Fr   (Active)   Amt returned on insertion(mL) 100 mL 8/18/2018 10:36 PM   Site Assessment Clean;Skin intact 8/18/2018 10:36 PM   Collection Container Standard drainage bag 8/18/2018 10:36 PM   Securement Method Securing device (Describe) 8/18/2018 10:36 PM   Output (mL) 150 mL 8/19/2018  2:01 PM     Lab Results:     Results from last 7 days  Lab Units 08/19/18  0534 08/19/18  0345 08/18/18  2154 08/18/18  1745 08/18/18  1704   WBC Thousand/uL  --  10 84*  --  9 66  --    HEMOGLOBIN g/dL  --  8 4*  --  8 1*  --    I STAT HEMOGLOBIN g/dl  --   --   --   --  9 2*   HEMATOCRIT %  --  29 8*  --  28 4* 27*   PLATELETS Thousands/uL  --  230  --  291  --    SODIUM mmol/L 143  --  143 142  --    POTASSIUM mmol/L 4 6  --  4 5 5 3  --    CHLORIDE mmol/L 111*  --  110* 109*  --    CO2 mmol/L 22  --  22 22  --    BUN mg/dL 54*  --  52* 51*  --    CREATININE mg/dL 2 69*  --  2 77* 2 70*  --    CALCIUM mg/dL 8 2*  --  8 6 8 3  --    MAGNESIUM mg/dL  --  2 4  --   --   --    PHOSPHORUS mg/dL  --  4 8*  --   --   --    TOTAL PROTEIN g/dL 5 8*  --   --  6 0*  --    BILIRUBIN TOTAL mg/dL 1 60*  --   --  1 80*  --    ALK PHOS U/L 238*  --   --  216*  --    ALT U/L 413*  --   --  429*  --    AST U/L 388*  --   --  473*  --    GLUCOSE RANDOM mg/dL 89  --  129 155*  --    GLUCOSE, ISTAT mg/dl  --   --   --   --  146*     Other Studies:

## 2018-08-19 NOTE — PLAN OF CARE
Problem: Potential for Falls  Goal: Patient will remain free of falls  INTERVENTIONS:  - Assess patient frequently for physical needs  -  Identify cognitive and physical deficits and behaviors that affect risk of falls    -  [place] fall precautions as indicated by assessment   - Educate patient/family on patient safety including physical limitations  - Instruct patient to call for assistance with activity based on assessment  - Modify environment to reduce risk of injury  - Consider OT/PT consult to assist with strengthening/mobility   Outcome: Progressing      Problem: Prexisting or High Potential for Compromised Skin Integrity  Goal: Skin integrity is maintained or improved  INTERVENTIONS:  - Identify patients at risk for skin breakdown  - Assess and monitor skin integrity  - Assess and monitor nutrition and hydration status  - Monitor labs (i e  albumin)  - Assess for incontinence   - Turn and reposition patient  - Assist with mobility/ambulation  - Relieve pressure over bony prominences  - Avoid friction and shearing  - Provide appropriate hygiene as needed including keeping skin clean and dry  - Evaluate need for skin moisturizer/barrier cream  - Collaborate with interdisciplinary team (i e  Nutrition, Rehabilitation, etc )   - Patient/family teaching   Outcome: Progressing

## 2018-08-19 NOTE — CONSULTS
Consultation - Cardiology   Juan Carlos Jacobs 70 y o  female MRN: [id number]  Unit/Bed#:  Encounter: [id number]      Assessment:  Principal Problem:    History of recent fall  Active Problems:    Supratherapeutic INR    SANTI (acute kidney injury) ([address] )    Elevated troponin    Hypoxia    Encephalopathy      Plan:    1  Encephalopathy and change in mental status - Reason is uncertain  Likely metabolic  Workup per the primary team     2  Type 2 NSTEMI - This is secondary to her change in mental status, acute kidney injury in the setting of having history of CHF and CAD  Medical management is all that is recommended  We will check an echocardiogram     3   Chronic CHF - Unclear as we do not have any records on whether this is systolic or diastolic  We are checking an echocardiogram   She did receive a few doses of IV Lasix  She appears close to euvolemic from me and I would hold off on any more IV Lasix until we gather records and more information  History of Present Illness   Physician Requesting Consult: Bob Clayton MD  Reason for Consult / Principal Problem:  CHF    HPI: Juan Carlos Jacobs is a 70y o  year old female who presents with a change in mental status and a subjective history by her daughter for difficulty breathing  She carries a history of coronary artery disease and prior coronary bypass grafting, along with a history of chronic CHF  She relocated here recently from [place]  She formally lived with different daughter, and was moved appear to live with her daughter that lives in this region  Her daughter can give me some of the history, but does not have all the history from [place]  She does tell me she has been in the hospital several times over the last year  It appears that most of these were related to either CHF, kidney failure or falls  She also carries a history of prior DVTs, and takes Eliquis  Patient is unable to provide history    The daughter states that she felt she looked a bit more short of breath and that her arm was swollen  She due to her change in mental status in the combination of the symptoms she was brought in for evaluation  She was found to be hypoxic and was placed on oxygen  She does take low-dose Lasix 20 mg daily at home  She did receive a few IV doses of Lasix here  Consults    Review of Systems:  Unobtainable secondary to the patient's mental status  Historical Information   Past Medical History:   Diagnosis Date    Cardiac disease     Hypertension      Past Surgical History:   Procedure Laterality Date    CORONARY ANGIOPLASTY WITH STENT PLACEMENT       History   Alcohol Use No     History   Drug Use No     History   Smoking Status    Former Smoker   Smokeless Tobacco    Never Used     Family History: non-contributory    Meds/Allergies   all current active meds have been reviewed  No Known Allergies      Current Facility-Administered Medications:     acetaminophen (TYLENOL) tablet 650 mg, 650 mg, Oral, Q6H PRN, Arash Vidales MD    albuterol (PROVENTIL HFA,VENTOLIN HFA) inhaler 2 puff, 2 puff, Inhalation, Q4H PRN, Aarsh Vidales MD    haloperidol lactate (HALDOL) injection 2 mg, 2 mg, Intravenous, Once, BETTY Keita, Stopped at 08/18/18 2218    hydrALAZINE (APRESOLINE) tablet 25 mg, 25 mg, Oral, TID, Arash Vidales MD, 25 mg at 08/18/18 2225    ketorolac (ACULAR) 0 5 % ophthalmic solution 1 drop, 1 drop, Left Eye, 4x Daily, Arash Vidales MD, 1 drop at 08/18/18 2225    ofloxacin (OCUFLOX) 0 3 % ophthalmic solution 1 drop, 1 drop, Left Eye, 4x Daily, Arash Vidales MD, 1 drop at 08/18/18 2226    prednisoLONE acetate (PRED FORTE) 1 % ophthalmic suspension 1 drop, 1 drop, Left Eye, 4x Daily, Arash Vidales MD, 1 drop at 08/18/18 2226    Objective   Vitals: Blood pressure 120/53, pulse (!) 52, temperature 97 9 °F (36 6 °C), temperature source Axillary, resp   rate 12, height 5' 4" (1 626 m), weight 72 5 kg (159 lb 13 3 oz), SpO2 100 %  , Body mass index is 27 44 kg/m² , Orthostatic Blood Pressures      Most Recent Value   Blood Pressure  120/53 filed at 08/19/2018 1100   Patient Position - Orthostatic VS  Lying filed at 08/19/2018 1100            Intake/Output Summary (Last 24 hours) at 08/19/18 1155  Last data filed at 08/19/18 1001   Gross per 24 hour   Intake              650 ml   Output             1270 ml   Net             -620 ml       Invasive Devices     Peripheral Intravenous Line            Peripheral IV Right External Jugular -- days    Peripheral IV 08/19/18 Right;Upper Arm less than 1 day          Drain            Urethral Catheter Latex 14 Fr  less than 1 day                    Physical Exam:  GEN: Melanee Alec appears well, resting comfortably not oriented  HEENT: pupils equal, round, and reactive to light; extraocular muscles intact,  Ecchymoses noted on face  NECK: supple, no carotid bruits   No significant JVD  HEART: regular rhythm, normal S1 and S2, systolic murmur, no clicks, gallops or rubs   LUNGS:  Diminished bilaterally; no wheezes, rales, or rhonchi   ABDOMEN: normal bowel sounds, soft, no tenderness, no distention  EXTREMITIES: peripheral pulses normal; no clubbing, cyanosis, or significant edema  NEURO: no focal findings   SKIN: normal without suspicious lesions on exposed skin    Lab Results:   Admission on 08/18/2018   Component Date Value    WBC 08/18/2018 9 66     RBC 08/18/2018 3 36*    Hemoglobin 08/18/2018 8 1*    Hematocrit 08/18/2018 28 4*    MCV 08/18/2018 85     MCH 08/18/2018 24 1*    MCHC 08/18/2018 28 5*    RDW 08/18/2018 20 4*    MPV 08/18/2018 10 6     Platelets 37/04/0092 291     nRBC 08/18/2018 4     Neutrophils Relative 08/18/2018 82*    Immat GRANS % 08/18/2018 1     Lymphocytes Relative 08/18/2018 9*    Monocytes Relative 08/18/2018 8     Eosinophils Relative 08/18/2018 0     Basophils Relative 08/18/2018 0     Neutrophils Absolute 08/18/2018 7 89*    Immature Grans Absolute 08/18/2018 0 06     Lymphocytes Absolute 08/18/2018 0 91     Monocytes Absolute 08/18/2018 0 79     Eosinophils Absolute 08/18/2018 0 00     Basophils Absolute 08/18/2018 0 01     Protime 08/18/2018 49 4*    INR 08/18/2018 5 65*    PTT 08/18/2018 45*    Sodium 08/18/2018 142     Potassium 08/18/2018 5 3     Chloride 08/18/2018 109*    CO2 08/18/2018 22     Anion Gap 08/18/2018 11     BUN 08/18/2018 51*    Creatinine 08/18/2018 2 70*    Glucose 08/18/2018 155*    Calcium 08/18/2018 8 3     eGFR 08/18/2018 17     Total Bilirubin 08/18/2018 1 80*    Bilirubin, Direct 08/18/2018 1 20*    Alkaline Phosphatase 08/18/2018 216*    AST 08/18/2018 473*    ALT 08/18/2018 429*    Total Protein 08/18/2018 6 0*    Albumin 08/18/2018 2 7*    TSH 3RD GENERATON 08/18/2018 5 484*    NT-proBNP 08/18/2018 288028*    Troponin I 08/18/2018 0 96*    LACTIC ACID 08/18/2018 2 2*    Color, UA 08/18/2018 Yellow     Clarity, UA 08/18/2018 Slightly Cloudy     Specific Gravity, UA 08/18/2018 1 025     pH, UA 08/18/2018 5 0     Leukocytes, UA 08/18/2018 Moderate*    Nitrite, UA 08/18/2018 Negative     Protein, UA 08/18/2018 30 (1+)*    Glucose, UA 08/18/2018 Negative     Ketones, UA 08/18/2018 Negative     Urobilinogen, UA 08/18/2018 0 2     Bilirubin, UA 08/18/2018 Negative     Blood, UA 08/18/2018 Small*    pH, Art i-STAT 08/18/2018 7  356     pCO2, Art i-STAT 08/18/2018 37 6     pO2, ART i-STAT 08/18/2018 194 0*    BE, i-STAT 08/18/2018 -4*    HCO3, Art i-STAT 08/18/2018 21 0*    CO2, i-STAT 08/18/2018 22     O2 Sat, i-STAT 08/18/2018 100*    SODIUM, I-STAT 08/18/2018 142     Potassium, i-STAT 08/18/2018 4 9     Calcium, Ionized i-STAT 08/18/2018 1 16     Hct, i-STAT 08/18/2018 27*    Hgb, i-STAT 08/18/2018 9 2*    Glucose, i-STAT 08/18/2018 146*    POC FIO2 08/18/2018 44     Specimen Type 08/18/2018 ARTERIAL     Delivery System 08/18/2018 Nasal Cannula     SITE 08/18/2018 Right Radial     LAITH TEST 08/18/2018 Postive Laith Test     POC Glucose 08/18/2018 114     RBC, UA 08/18/2018 10-20*    WBC, UA 08/18/2018 30-50*    Epithelial Cells 08/18/2018 Occasional     Bacteria, UA 08/18/2018 Innumerable*    Fine granular casts 08/18/2018 1-2     Vitamin B-12 08/18/2018 3857*    Troponin I 08/18/2018 1 12*    LACTIC ACID 08/18/2018 2 0     Ammonia 08/18/2018 11     Sodium 08/18/2018 143     Potassium 08/18/2018 4 5     Chloride 08/18/2018 110*    CO2 08/18/2018 22     Anion Gap 08/18/2018 11     BUN 08/18/2018 52*    Creatinine 08/18/2018 2 77*    Glucose 08/18/2018 129     Calcium 08/18/2018 8 6     eGFR 08/18/2018 16     Troponin I 08/19/2018 1 04*    Troponin I 08/19/2018 0 77*    WBC 08/19/2018 10 84*    RBC 08/19/2018 3 50*    Hemoglobin 08/19/2018 8 4*    Hematocrit 08/19/2018 29 8*    MCV 08/19/2018 85     MCH 08/19/2018 24 0*    MCHC 08/19/2018 28 2*    RDW 08/19/2018 20 6*    MPV 08/19/2018 11 0     Platelets 09/57/3330 230     nRBC 08/19/2018 2     Neutrophils Relative 08/19/2018 79*    Immat GRANS % 08/19/2018 1     Lymphocytes Relative 08/19/2018 12*    Monocytes Relative 08/19/2018 8     Eosinophils Relative 08/19/2018 0     Basophils Relative 08/19/2018 0     Neutrophils Absolute 08/19/2018 8 57*    Immature Grans Absolute 08/19/2018 0 05     Lymphocytes Absolute 08/19/2018 1 32     Monocytes Absolute 08/19/2018 0 89     Eosinophils Absolute 08/19/2018 0 00     Basophils Absolute 08/19/2018 0 01     Sodium 08/19/2018 143     Potassium 08/19/2018 4 6     Chloride 08/19/2018 111*    CO2 08/19/2018 22     Anion Gap 08/19/2018 10     BUN 08/19/2018 54*    Creatinine 08/19/2018 2 69*    Glucose 08/19/2018 89     Calcium 08/19/2018 8 2*    AST 08/19/2018 388*    ALT 08/19/2018 413*    Alkaline Phosphatase 08/19/2018 238*    Total Protein 08/19/2018 5 8*    Albumin 08/19/2018 2 7*    Total Bilirubin 08/19/2018 1 60*    eGFR 08/19/2018 17     Magnesium 08/19/2018 2 4     Phosphorus 08/19/2018 4 8*     Labs & Results:      Results from last 7 days  Lab Units 08/19/18  0345 08/19/18  0117 08/18/18  2200   TROPONIN I ng/mL 0 77* 1 04* 1 12*       Results from last 7 days  Lab Units 08/19/18  0345 08/18/18  1745 08/18/18  1704   WBC Thousand/uL 10 84* 9 66  --    HEMOGLOBIN g/dL 8 4* 8 1*  --    I STAT HEMOGLOBIN g/dl  --   --  9 2*   HEMATOCRIT % 29 8* 28 4* 27*   PLATELETS Thousands/uL 230 291  --            Results from last 7 days  Lab Units 08/19/18  0534 08/18/18  2154 08/18/18  1745   SODIUM mmol/L 143 143 142   POTASSIUM mmol/L 4 6 4 5 5 3   CHLORIDE mmol/L 111* 110* 109*   CO2 mmol/L 22 22 22   BUN mg/dL 54* 52* 51*   CREATININE mg/dL 2 69* 2 77* 2 70*   CALCIUM mg/dL 8 2* 8 6 8 3   TOTAL PROTEIN g/dL 5 8*  --  6 0*   BILIRUBIN TOTAL mg/dL 1 60*  --  1 80*   ALK PHOS U/L 238*  --  216*   ALT U/L 413*  --  429*   AST U/L 388*  --  473*   GLUCOSE RANDOM mg/dL 89 129 155*       Results from last 7 days  Lab Units 08/18/18  1745   INR  5 65*   PTT seconds 45*       Results from last 7 days  Lab Units 08/19/18  0345   MAGNESIUM mg/dL 2 4         Imaging: I have personally reviewed pertinent reports  Ct Head Wo Contrast    Result Date: 8/18/2018  Narrative: CT BRAIN - WITHOUT CONTRAST INDICATION:   Confusion  Disorientation  COMPARISON:  None  TECHNIQUE:  CT examination of the brain was performed  In addition to axial images, coronal 2D reformatted images were created and submitted for interpretation  Radiation dose length product (DLP) for this visit:  1202 mGy-cm   This examination, like all CT scans performed in the [facility], was performed utilizing techniques to minimize radiation dose exposure, including the use of iterative reconstruction and automated exposure control  IMAGE QUALITY:  Diagnostic   FINDINGS: PARENCHYMA: Decreased attenuation is noted in periventricular and subcortical white matter demonstrating an appearance that is statistically most likely to represent mild microangiopathic change  Areas of parenchymal hypodensity in the right posterior parietal and right occipital region suggestive of chronic infarcts  No intracranial mass, mass effect or midline shift  No acute parenchymal hemorrhage  VENTRICLES AND EXTRA-AXIAL SPACES:  Normal for the patient's age  VISUALIZED ORBITS AND PARANASAL SINUSES:  Unremarkable  CALVARIUM AND EXTRACRANIAL SOFT TISSUES:  Normal      Impression: No acute intracranial abnormality  Microangiopathic changes  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

## 2018-08-19 NOTE — ASSESSMENT & PLAN NOTE
Patient has only one kidney  Creatinine is 2 69  Unknown baseline  Essentially stable when compared to yesterday  If she is moving to [place] she will likely need a nephrologist here  Daughter is eager for her to establish care with [facility]

## 2018-08-19 NOTE — ASSESSMENT & PLAN NOTE
Patient has large bruise/hematology  CT brain - negative  Unclear why she fell  Needs PT/OT evaluation

## 2018-08-19 NOTE — ASSESSMENT & PLAN NOTE
Will reverse INR as I am not clear of her bleeding status at this time  INR today pending  Reversed yesterday with vitamin K

## 2018-08-19 NOTE — PROGRESS NOTES
Progress Note - Juan Carlos [facility] 1940, 70 y o  female MRN: [id number]    Unit/Bed#:  Encounter: [id number]    Primary Care Provider: No primary care provider on file  Date and time admitted to hospital: 8/18/2018  4:17 PM        Encephalopathy   Assessment & Plan    Improving  Responding, although confused  AAOx1  Elevated troponin   Assessment & Plan    Echo  Serial troponins  Will monitor on telemetry  SANTI (acute kidney injury) [facility])   Assessment & Plan    Patient has only one kidney  Creatinine is 2 69  Unknown baseline  Essentially stable when compared to yesterday  If she is moving to [facility] she will likely need a nephrologist here  Daughter is eager for her to establish care with [facility]  Supratherapeutic INR   Assessment & Plan    Will reverse INR as I am not clear of her bleeding status at this time  INR today pending  Reversed yesterday with vitamin K          * History of recent fall   Assessment & Plan    Patient has large bruise/hematology  CT brain - negative  Unclear why she fell  Needs PT/OT evaluation  VTE Pharmacologic Prophylaxis:   Pharmacologic: coumadin  Mechanical VTE Prophylaxis in Place: Yes    Patient Centered Rounds: I have performed bedside rounds with nursing staff today  Discussions with Specialists or Other Care Team Provider: Not discussed with specialist      Education and Discussions with Family / Patient: Discussed with daughter today  Time Spent for Care: 30 minutes  More than 50% of total time spent on counseling and coordination of care as described above  Current Length of Stay: 1 day(s)    Current Patient Status: Inpatient   Certification Statement: The patient will continue to require additional inpatient hospital stay due to pain, ambulatory dysfunction  Discharge Plan: Not medically stable for DC       Code Status: Level 1 - Full Code      Subjective:   Patient seen and examined  No new symptoms     " I need to get out of here"    Objective:     Vitals:   Temp (24hrs), Av 6 °F (36 4 °C), Min:97 3 °F (36 3 °C), Max:97 9 °F (36 6 °C)    HR:  [51-60] 52  Resp:  [12-27] 12  BP: (113-150)/(53-64) 120/53  SpO2:  [82 %-100 %] 100 %  Body mass index is 27 44 kg/m²  Input and Output Summary (last 24 hours): Intake/Output Summary (Last 24 hours) at 18 1156  Last data filed at 18 1001   Gross per 24 hour   Intake              650 ml   Output             1270 ml   Net             -620 ml       Physical Exam:     Physical Exam   Constitutional: She appears well-developed  No distress  HENT:   Mouth/Throat: No oropharyngeal exudate  Facial bruising  Eyes: Right eye exhibits no discharge  Left eye exhibits no discharge  No scleral icterus  Neck: JVD present  No tracheal deviation present  No thyromegaly present  Cardiovascular: Normal rate  Exam reveals no gallop and no friction rub  No murmur heard  Pulmonary/Chest: Effort normal  No respiratory distress  She has no wheezes  She has no rales  She exhibits no tenderness  Abdominal: Soft  She exhibits no distension and no mass  There is no tenderness  There is no rebound and no guarding  Musculoskeletal: She exhibits no edema, tenderness or deformity  Lymphadenopathy:     She has no cervical adenopathy  Neurological: She is alert  No cranial nerve deficit  Coordination normal    Skin: Skin is warm  No rash noted  She is not diaphoretic  No erythema  There is pallor  Psychiatric: She has a normal mood and affect         Additional Data:     Labs:      Results from last 7 days  Lab Units 18  0345   WBC Thousand/uL 10 84*   HEMOGLOBIN g/dL 8 4*   HEMATOCRIT % 29 8*   PLATELETS Thousands/uL 230   NEUTROS PCT % 79*   LYMPHS PCT % 12*   MONOS PCT % 8   EOS PCT % 0       Results from last 7 days  Lab Units 18  0534   SODIUM mmol/L 143   POTASSIUM mmol/L 4 6   CHLORIDE mmol/L 111*   CO2 mmol/L 22   BUN mg/dL 54*   CREATININE mg/dL 2 69*   CALCIUM mg/dL 8 2*   TOTAL PROTEIN g/dL 5 8*   BILIRUBIN TOTAL mg/dL 1 60*   ALK PHOS U/L 238*   ALT U/L 413*   AST U/L 388*   GLUCOSE RANDOM mg/dL 89       Results from last 7 days  Lab Units 08/18/18  1745   INR  5 65*       Results from last 7 days  Lab Units 08/18/18  1634   POC GLUCOSE mg/dl 114             * I Have Reviewed All Lab Data Listed Above  * Additional Pertinent Lab Tests Reviewed: [place] [address] Admission Reviewed    Imaging:    Imaging Reports Reviewed Today Include:   CT brain -   "No acute intracranial abnormality   Microangiopathic changes   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 "  Imaging Personally Reviewed by Myself Includes:  As above  Recent Cultures (last 7 days):           Last 24 Hours Medication List:     Current Facility-Administered Medications:  acetaminophen 650 mg Oral Q6H PRN Damián Connor MD   albuterol 2 puff Inhalation Q4H PRN Damián Connor MD   haloperidol lactate 2 mg Intravenous Once Sonja Atkinson, CRNP   hydrALAZINE 25 mg Oral TID Damián Connor MD   ketorolac 1 drop Left Eye 4x Daily Damián Connor MD   ofloxacin 1 drop Left Eye 4x Daily Damián Connor MD   prednisoLONE acetate 1 drop Left Eye 4x Daily Damián Connor MD        Today, Patient Was Seen By: Damián Connor MD    ** Please Note: Dictation voice to text software may have been used in the creation of this document   **

## 2018-08-20 PROBLEM — I10 ESSENTIAL HYPERTENSION: Status: ACTIVE | Noted: 2018-01-01

## 2018-08-20 PROBLEM — R79.89 ELEVATED SERUM CREATININE: Status: ACTIVE | Noted: 2018-01-01

## 2018-08-20 PROBLEM — E87.8 LOW BICARBONATE LEVEL: Status: ACTIVE | Noted: 2018-01-01

## 2018-08-20 PROBLEM — D50.9 IRON DEFICIENCY ANEMIA: Status: ACTIVE | Noted: 2018-01-01

## 2018-08-20 NOTE — PROGRESS NOTES
NEPHROLOGY PROGRESS NOTE   Donel Minus 70 y o  female MRN: [id number]  Unit/Bed#: [facility] Encounter: [id number]  Reason for Consult: Elevated Creatinine    ASSESSMENT/PLAN:  1  Elevated Creatinine- unclear if this is SANTI or CKD without a baseline creatinine  - creatinine has been relatively stable around 2 7-2 8 which may be her baseline creatinine  - UA: small blood (10-20 RBC), trace protein, moderate leuks (30-50 WBC), innumerable bacteria  - UPC ratio: 0 8  - solitary functioning left kidney with atrophic right kidney  - check PVRs q shift  - need to obtain records [facility]  - will need follow up in our office if patient is going to stay in the / [place] [id number]  2  Hypertension- BP controlled  3  CHF- cardiology on board, check echocardiogram, holding diuretics for now  4  Anemia- iron studies low  - give venofer 300mg x3 doses  5  Low bicarbonate level- monitor, if worsens would start NaCO3 tabs  6  Encephalopathy- per primary team    Disposition:  Obtain records from [facility]    SUBJECTIVE:  Patient states she is in pain all over, complaining of stomach pain and back pain  She also states she has to urinate right now  Daughter is not at bedside at this time      OBJECTIVE:  Current Weight: Weight - Scale: 72 1 kg (158 lb 15 2 oz)  Vitals:    08/19/18 2042 08/19/18 2156 08/20/18 0600 08/20/18 0647   BP: 118/53 121/58  125/61   BP Location:  Left arm  Left arm   Pulse:  56  (!) 53   Resp:  14  14   Temp:  97 8 °F (36 6 °C)  97 9 °F (36 6 °C)   TempSrc:  Oral  Oral   SpO2:  98%  98%   Weight:   72 1 kg (158 lb 15 2 oz)    Height:           Intake/Output Summary (Last 24 hours) at 08/20/18 1011  Last data filed at 08/20/18 0601   Gross per 24 hour   Intake              360 ml   Output              925 ml   Net             -565 ml     General: NAD  Skin: no rash  HEENT: normocephalic  Neck: supple  Chest: CTAB  Heart: RRR  Abdomen: soft nt nd  Extremities: no edema  Neuro: alert awake  Psych: mood and affect appropriate    Medications:    Current Facility-Administered Medications:     acetaminophen (TYLENOL) tablet 650 mg, 650 mg, Oral, Q6H PRN, Link Peng MD, 650 mg at 08/19/18 2305    albuterol (PROVENTIL HFA,VENTOLIN HFA) inhaler 2 puff, 2 puff, Inhalation, Q4H PRN, Link Peng MD    diazepam (VALIUM) tablet 5 mg, 5 mg, Oral, Q6H PRN, August Esquivel PA-C, 5 mg at 08/20/18 0257    haloperidol lactate (HALDOL) injection 2 mg, 2 mg, Intravenous, Once, Luba Carbon, CRNP, Stopped at 08/18/18 2218    hydrALAZINE (APRESOLINE) tablet 25 mg, 25 mg, Oral, TID, Link Peng MD, 25 mg at 08/20/18 0934    ketorolac (ACULAR) 0 5 % ophthalmic solution 1 drop, 1 drop, Left Eye, 4x Daily, Link Peng MD, 1 drop at 08/20/18 0935    menthol-methyl salicylate (BENGAY) 94-51 % cream, , Apply externally, 4x Daily PRN, Vitaly Thrasher PA-C    ofloxacin (OCUFLOX) 0 3 % ophthalmic solution 1 drop, 1 drop, Left Eye, 4x Daily, Link Peng MD, 1 drop at 08/20/18 0935    prednisoLONE acetate (PRED FORTE) 1 % ophthalmic suspension 1 drop, 1 drop, Left Eye, 4x Daily, Link Peng MD, 1 drop at 08/20/18 0934    Laboratory Results:    Results from last 7 days  Lab Units 08/20/18  0604 08/19/18  0534 08/19/18  0345 08/18/18  2154 08/18/18  1745 08/18/18  1705 08/18/18  1704   WBC Thousand/uL  --   --  10 84*  --  9 66  --   --    HEMOGLOBIN g/dL  --   --  8 4*  --  8 1*  --   --    I STAT HEMOGLOBIN g/dl  --   --   --   --   --  10 2* 9 2*   HEMATOCRIT %  --   --  29 8*  --  28 4* 30* 27*   PLATELETS Thousands/uL  --   --  230  --  291  --   --    SODIUM mmol/L 141 143  --  143 142  --   --    POTASSIUM mmol/L 3 9 4 6  --  4 5 5 3  --   --    CHLORIDE mmol/L 109* 111*  --  110* 109*  --   --    CO2 mmol/L 20* 22  --  22 22  --   --    BUN mg/dL 57* 54*  --  52* 51*  --   --    CREATININE mg/dL 2 81* 2 69*  --  2 77* 2 70*  --   --    CALCIUM mg/dL 7 8* 8 2*  --  8 6 8 3  --   --    MAGNESIUM mg/dL  --   -- 2 4  --   --   --   --    PHOSPHORUS mg/dL  --   --  4 8*  --   --   --   --    TOTAL PROTEIN g/dL  --  5 8*  --   --  6 0*  --   --    GLUCOSE RANDOM mg/dL 97 89  --  129 155*  --   --    GLUCOSE, ISTAT mg/dl  --   --   --   --   --  122 146*

## 2018-08-20 NOTE — PHYSICAL THERAPY NOTE
PHYSICAL THERAPY EVALUATION  NAME:  Codi Michael  DATE: 08/20/18    AGE:   70 y o  Mrn:   [id number]  ADMIT DX:  Leg pain [M79 606]  Altered mental status [R41 82]  Hypoxia [R09 02]  Acute renal failure (ARF) (HCC) [N17 9]  Supratherapeutic INR [R79 1]  Elevated troponin I level [R74 8]    Past Medical History:   Diagnosis Date    Cardiac disease     Hypertension      Length Of Stay: 2  Performed at least 2 patient identifiers during session: Name and ID gary, daughter verified  PHYSICAL THERAPY EVALUATION :   08/20/18 8616   Note Type   Note type Eval only   Pain Assessment   Pain Assessment FLACC   Pain Location Generalized   Pain Rating: FLACC (Rest) - Face 1   Pain Rating: FLACC (Rest) - Legs 1   Pain Rating: FLACC (Rest) - Activity 1   Pain Rating: FLACC (Rest) - Cry 0   Pain Rating: FLACC (Rest) - Consolability 0   Score: FLACC (Rest) 3   Pain Rating: FLACC (Activity) - Face 1   Pain Rating: FLACC (Activity) - Legs 1   Pain Rating: FLACC (Activity) - Activity 1   Pain Rating: FLACC (Activity) - Cry 1   Pain Rating: FLACC (Activity) - Consolability 1   Score: FLACC (Activity) 5   Home Living   Type of Home (Daughter transported [facility])   [address] [facility]   Prior Function   Falls in the last 6 months (Pt unable to quantify)   Restrictions/Precautions   Other Precautions Bed Alarm; Chair Alarm;Cognitive; Fall Risk;Pain;Visual impairment;O2   General   Additional Pertinent History upon entering room, daughter tearful at pt's bedside   She reported that she sat pt on edge of bed earlier today for a few minutes   Family/Caregiver Present Yes  (daughter)   Cognition   Overall Cognitive Status Impaired   Arousal/Participation Lethargic   Orientation Level Oriented to person;Disoriented to situation;Disoriented to time;Disoriented to place   Memory Unable to assess  (due to lethargy)   Following Commands Follows one step commands inconsistently  (50% of time)   RUE Strength   RUE Overall Strength Deficits  (able to achieve shoulder flex to 90, but no overpressure AG)   LUE Assessment   LUE Assessment (UE edema)   LUE Strength   LUE Overall Strength Deficits  (able to achieve shoulder flex to 90, but no overpressure AG)   RLE Overall PROM   R Ankle Dorsiflexion limited to neutral   Strength RLE   R Hip Flexion 4-/5   R Knee Extension 3-/5   R Ankle Dorsiflexion 3-/5   LLE Overall PROM   L Ankle Dorsiflexion limited to neutral   Strength LLE   L Hip Flexion 3-/5   L Knee Extension 2-/5   Coordination   Movements are Fluid and Coordinated 0   Coordination and Movement Description bradyinetic, dysmetria   Sensation (limited vision; Hearing appears WFL)   Light Touch   RLE Light Touch Not tested  (due to lethargy)   LLE Light Touch Not tested   Bed Mobility   Supine to Sit 1  Dependent   Additional items Assist x 1; Increased time required;Verbal cues; Bedrails;HOB elevated;LE management   Sit to Supine 1  Dependent   Additional items Assist x 2; Increased time required;Verbal cues;HOB elevated   Additional Comments depedent for repositioning toward head of bed   Transfers   Sit to Stand Unable to assess  (due to limited sitting balance and knee extension)   Ambulation/Elevation   Gait pattern Not appropriate   Balance   Static Sitting Poor -  (sitting tolerance x 5 min  )   Endurance Deficit   Endurance Deficit Yes   Endurance Deficit Description limited sitting tolerance, eyes closed for most of session  Activity Tolerance   Activity Tolerance Patient limited by fatigue;Patient limited by pain   Nurse Made Aware spoke to [facility]   Assessment   Prognosis Fair   Problem List Decreased strength;Decreased range of motion;Decreased endurance; Impaired balance;Decreased mobility; Decreased coordination;Decreased cognition; Impaired vision;Obesity; Decreased skin integrity;Pain   Barriers to Discharge Decreased caregiver support; Inaccessible home environment   Goals   Patient Goals to be able to walk again   STG Expiration Date 08/30/18   Treatment Day 0   Plan   Treatment/Interventions LE strengthening/ROM; Therapeutic exercise; Endurance training;Cognitive reorientation;Patient/family training;Equipment eval/education; Bed mobility;Continued evaluation;Spoke to nursing   PT Frequency 5x/wk   Recommendation   Recommendation Post acute IP rehab   Equipment Recommended Other (Comment)  (TBD---will need mechanical conveyance for OOB @present)   Barthel Index   Feeding 0   Bathing 0   Grooming Score 0   Dressing Score 0   Bladder Score 0   Bowels Score 10   Toilet Use Score 0   Transfers (Bed/Chair) Score 0   Mobility (Level Surface) Score 0   Stairs Score 0   Barthel Index Score 10   (Please find full objective findings from PT assessment regarding body systems outlined above)  Assessment: Pt is a 70 y o  female seen for PT evaluation s/p admit to [address] [place] [address] on 8/18/2018 w/ History of recent fall  Family shared transport of pt from [place] to [place]  Order placed for PT  Prior to admission, per daughter pt was using a walker, but no device over a month ago  Tanika Mauro Upon evaluation: Pt requires dependent 1-2 person assistance for bed mobilty and is not appropriate for weight bearing transfers  Pt's clinical presentation is currently unstable/unpredictable given the functional mobility deficits above, especially weakness, decreased endurance and decreased functional mobility tolerance, coupled with fall risks including hx of falls, impaired balance, altered vision and decreased cognition, and combined with medical complications of bradycardia, multiple readmissions and new onset O2 use  Pt to benefit from continued skilled PT tx while in hospital and upon DC to address deficits as defined above and maximize level of functional mobility   From PT/mobility standpoint, recommendation at time of d/c would be inpatient rehab pending progress in order to maximize pt's functional independence and consistency w/ mobility in order to facilitate return to PLOF  Recommend trial with quick move next 1-2 sessions, ther ex next 1-2 sessions, mechanical conveyance from nursing standpoint for OOB mobility, OT consult and case management consult  Goals: Pt will: Perform bed mobility tasks to min A to decrease burden of care and improve ease of bed mobility  Assess weight bearing transfers and gait when appropriate to decrease risk for falls and progress to pt's goal of walking again  Perform sitting tolerance on edge of bed with S for 15 min to  improve activity tolerance and improve ability to perform upright tasks at home  Increase BLE strength by one grade to improve ease of bed mobility  The following objective measures were performed on IE: Barthel Index 10/100  Comorbidities affecting pt's physical performance at time of assessment include: HTN, obesity, limited vision, limited cognition and CAD  Personal factors affecting pt at time of IE include: advanced age, past experience, inability to ambulate household distances, inability to navigate community distances, limited insight into impairments and recent fall(s)       Peter Rader, PT, DPT

## 2018-08-20 NOTE — PROGRESS NOTES
Cardiology Progress Note - Josue Shaw 70 y o  female MRN: [id number]    Unit/Bed#: -01 Encounter: [id number]      Assessment:  1  Acute on chronic combined CHF  2  CAD s/p CABG - details and anatomy unknown  3  Change in mental status  4  Elevated creatinine - unclear baseline   5  Hypertension   6  Dyslipidemia   7  Moderate aortic stenosis, mild to moderate aortic regurgitation  8  Pulmonary hypertension - likely group 2/3  9  Type 2 NSTEMI     Plan:  1   Echo noted - no old for comparison - will need to attempt to obtain old records  2  Volume status appears stable - s/p few doses of IV Lasix  3  BMP in AM - resume oral Lasix in AM  4  Blood pressure controlled today - continue current regimen   5  Tele with heart rates 40s/50s - no pauses or high grade AV block, continue to monitor    Subjective:   Patient seen and examined  No significant events overnight  Objective:     Vitals: Blood pressure 125/58, pulse (!) 54, temperature 97 9 °F (36 6 °C), temperature source Oral, resp  rate 18, height 5' 4" (1 626 m), weight 72 1 kg (158 lb 15 2 oz), SpO2 100 %  , Body mass index is 27 28 kg/m² , Orthostatic Blood Pressures      Most Recent Value   Blood Pressure  125/58 filed at 08/20/2018 1517   Patient Position - Orthostatic VS  Lying filed at 08/20/2018 1517            Intake/Output Summary (Last 24 hours) at 08/20/18 1524  Last data filed at 08/20/18 1115   Gross per 24 hour   Intake              360 ml   Output             1100 ml   Net             -740 ml           Physical Exam:    GEN: Josue Shaw appears chronically ill, lethargic but arousable  HEENT: pupils equal, round, and reactive to light; extraocular muscles intact  NECK: supple, no carotid bruits, no obvious elevated JVP  HEART: regular rhythm, normal S1 and S2, 2/6 mid peaking SEJAL RUSB   LUNGS: decreased breath sounds bilaterally   ABDOMEN: normal bowel sounds, soft, no tenderness, no distention  EXTREMITIES: peripheral pulses normal; no clubbing, cyanosis, + edema  NEURO: no focal findings   SKIN: normal without suspicious lesions on exposed skin    Medications:      Current Facility-Administered Medications:     acetaminophen (TYLENOL) tablet 650 mg, 650 mg, Oral, Q6H PRN, Sylvain Madison MD, 650 mg at 08/20/18 1310    albuterol (PROVENTIL HFA,VENTOLIN HFA) inhaler 2 puff, 2 puff, Inhalation, Q4H PRN, Sylvain Madison MD    b complex-vitamin C-folic acid (NEPHROCAPS) capsule 1 capsule, 1 capsule, Oral, Daily With Dinner, [facility]    diazepam (VALIUM) tablet 5 mg, 5 mg, Oral, Q6H PRN, Sadie Klein PA-C, 5 mg at 08/20/18 1458    haloperidol lactate (HALDOL) injection 2 mg, 2 mg, Intravenous, Once, BETTY GUAN, Stopped at 08/18/18 2218    hydrALAZINE (APRESOLINE) tablet 25 mg, 25 mg, Oral, TID, Xiomara Handley PA-C    iron sucrose (VENOFER) 300 mg in sodium chloride 0 9 % 250 mL IVPB, 300 mg, Intravenous, Daily, Xiomara Handley PA-C, Last Rate: 176 7 mL/hr at 08/20/18 1316, 300 mg at 08/20/18 1316    ketorolac (ACULAR) 0 5 % ophthalmic solution 1 drop, 1 drop, Left Eye, 4x Daily, Sylvain Madison MD, 1 drop at 08/20/18 1317    menthol-methyl salicylate (BENGAY) 61-71 % cream, , Apply externally, 4x Daily PRN, Vitaly Thrasher PA-C    ofloxacin (OCUFLOX) 0 3 % ophthalmic solution 1 drop, 1 drop, Left Eye, 4x Daily, Sylvain Madison MD, 1 drop at 08/20/18 1317    prednisoLONE acetate (PRED FORTE) 1 % ophthalmic suspension 1 drop, 1 drop, Left Eye, 4x Daily, Sylvain Madison MD, 1 drop at 08/20/18 1317     Labs & Results:      Results from last 7 days  Lab Units 08/19/18  0345 08/19/18  0117 08/18/18  2200   TROPONIN I ng/mL 0 77* 1 04* 1 12*       Results from last 7 days  Lab Units 08/19/18  0345 08/18/18  1745 08/18/18  1705   WBC Thousand/uL 10 84* 9 66  --    HEMOGLOBIN g/dL 8 4* 8 1*  --    I STAT HEMOGLOBIN g/dl  --   --  10 2*   HEMATOCRIT % 29 8* 28 4* 30*   PLATELETS Thousands/uL 230 291  --            Results from last 7 days  Lab Units 08/20/18  0604 08/19/18  0534 08/18/18  2154 08/18/18  1745   SODIUM mmol/L 141 143 143 142   POTASSIUM mmol/L 3 9 4 6 4 5 5 3   CHLORIDE mmol/L 109* 111* 110* 109*   CO2 mmol/L 20* 22 22 22   BUN mg/dL 57* 54* 52* 51*   CREATININE mg/dL 2 81* 2 69* 2 77* 2 70*   CALCIUM mg/dL 7 8* 8 2* 8 6 8 3   TOTAL PROTEIN g/dL  --  5 8*  --  6 0*   BILIRUBIN TOTAL mg/dL  --  1 60*  --  1 80*   ALK PHOS U/L  --  238*  --  216*   ALT U/L  --  413*  --  429*   AST U/L  --  388*  --  473*   GLUCOSE RANDOM mg/dL 97 89 129 155*       Results from last 7 days  Lab Units 08/20/18  0628 08/18/18  1745   INR  2 54* 5 65*   PTT seconds  --  45*       Results from last 7 days  Lab Units 08/19/18  0345   MAGNESIUM mg/dL 2 4       Counseling / Coordination of Care  Total floor / unit time spent today 20 minutes  Greater than 50% of total time was spent with the patient and / or family counseling and / or coordination of care

## 2018-08-20 NOTE — CASE MANAGEMENT
Initial Clinical Review    Admission: Date/Time/Statement: 8/18/18 @ 1832     Orders Placed This Encounter   Procedures    Inpatient Admission (expected length of stay for this patient is greater than two midnights)     Standing Status:   Standing     Number of Occurrences:   1     Order Specific Question:   Admitting Physician     Answer:   Pro Nuno     Order Specific Question:   Level of Care     Answer:   Med Surg [16]     Order Specific Question:   Estimated length of stay     Answer:   More than 2 Midnights     Order Specific Question:   Certification     Answer:   I certify that inpatient services are medically necessary for this patient for a duration of greater than two midnights  See H&P and MD Progress Notes for additional information about the patient's course of treatment   Inpatient Admission (expected length of stay for this patient is greater than two midnights)     Standing Status:   Standing     Number of Occurrences:   1     Order Specific Question:   Admitting Physician     Answer:   Pro Nuno     Order Specific Question:   Level of Care     Answer:   Level 2 Stepdown / HOT [14]     Order Specific Question:   Estimated length of stay     Answer:   More than 2 Midnights     Order Specific Question:   Certification     Answer:   I certify that inpatient services are medically necessary for this patient for a duration of greater than two midnights  See H&P and MD Progress Notes for additional information about the patient's course of treatment           ED: Date/Time/Mode of Arrival:   ED Arrival Information     Expected Arrival Acuity Means of Arrival Escorted By Service Admission Type    - 8/18/2018 16:11 Emergent Wheelchair Family Member General Medicine Emergency    Arrival Complaint    Leg Pain          Chief Complaint:   Chief Complaint   Patient presents with    Leg Swelling     Pt presents to ED from home w/ bilateral leg swelling, and arm swelling, with harriett  Pt (+) extensive health hx  Pt d/c'ed from hospital 4 days ago from falling  History of Illness: 70 y o  female who presents with altered mental status and difficulty breathing  She is unable to give me a history secondary to altered mental status, and her daughter is at bedside and is attempting to give a collateral history      Unfortunately the daughter is a poor historian as well  As per the daughter the patient was recently at a hospital in [address] [place] [address] where she was admitted for fall  The patient has visible bruising on the right side of her face to evidence this  She was discharged from this hospital on Thursday and was staying with her daughter in [address] [place] [address], up until her other daughter who lives here and [facility], who is currently here at bedside, drove to pick her up and bring her back to [facility]  As as per the daughter was at bedside, the patient had been started on 3 new heart medications prior to her fall, and had a heart rate in the 30s when she was admitted to hospital last week      The patient is apparently alert and oriented x3 with only mild short-term memory issues at baseline, however when she was discharged from hospital on Thursday, she was not herself as per her daughter    As per the patient's daughter she drove up from [address] [place] [address] today with her mother with the intent to have her stay with her going forward, but noted that her mother was becoming increasingly short of breath and more confused in the car, so she brought her into the emergency department here at [address] [place] [address]     ED Vital Signs:   ED Triage Vitals   Temperature Pulse Respirations Blood Pressure SpO2   08/18/18 1625 08/18/18 1625 08/18/18 1625 08/18/18 1625 08/18/18 1625   (!) 97 4 °F (36 3 °C) 60 14 148/64 (!) 82 %      Temp Source Heart Rate Source Patient Position - Orthostatic VS BP Location FiO2 (%)   08/18/18 1625 08/18/18 1625 08/18/18 1625 08/18/18 1625 --   Rectal Monitor Lying Right arm       Pain Score       08/18/18 1749       No Pain        Wt Readings from Last 1 Encounters:   08/20/18 72 1 kg (158 lb 15 2 oz)       Vital Signs (abnormal):   Date and Time Temp Pulse SpO2 Resp BP   08/18/18 2059 --  52 100 % 16 141/62   08/18/18 2015 --  52 100 % 16 121/57   08/18/18 1915 --  52 100 % -- 113/54   08/18/18 1900 --  52 100 % 16 130/58   08/18/18 1800 --  54 100 % -- 126/59   08/18/18 1749 --  54 100 % 16 133/60     Abnormal Labs/Diagnostic Test Results: Cl 109, Bun 51, Creat 2 70, Total Bilirubin 1 80, Direct Bilirubin 1 20, Alk Phos 216, , , NT-pro ,415, Lactic Acid 2 2, PT/INR 49 4/5 65,   Troponin 0 96, H/H 8 1/28 4, Neutro PCT 82    CT of Head: No acute intracranial abnormality   Microangiopathic changes   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  CXR: Cardiomegaly   Left-sided pleural effusion        EKG: Sinus bradycardia, Left axis deviation    ED Treatment:   Medication Administration from 08/18/2018 1611 to 08/18/2018 2118       Date/Time Order Dose Route Action Action by Comments     08/18/2018 1857 sodium chloride 0 9 % bolus 250 mL 0 mL Intravenous Stopped Kayley Shah RN      08/18/2018 1757 sodium chloride 0 9 % bolus 250 mL 250 mL Intravenous Ld 37 Kayley Shah [facility]      08/18/2018 2059 phytonadione (AQUA-MEPHYTON) 10 mg/mL 5 mg in sodium chloride 0 9 % 50 mL IVPB 5 mg Intravenous New Bag Mary Underwood [facility]      08/18/2018 2053 furosemide (LASIX) injection 20 mg 20 mg Intravenous Given Mary Underwood RN /62          Past Medical/Surgical History:    Active Ambulatory Problems     Diagnosis Date Noted    No Active Ambulatory Problems     Resolved Ambulatory Problems     Diagnosis Date Noted    No Resolved Ambulatory Problems     Past Medical History:   Diagnosis Date    Cardiac disease     Hypertension        Admitting Diagnosis: Leg pain [M79 606]  Altered mental status [R41 82]  Hypoxia [R09 02]  Acute renal failure (ARF) ([facility] [address] ) [N17 9]  Supratherapeutic INR [R79 1]  Elevated troponin I level [R74 8]    Age/Sex: 70 y o  female    Assessment/Plan:     Encephalopathy   Assessment & Plan     As per daughter patient is AAOx3  She is not responding at bedside when I try to speak to her  Does not respond to sternal rub  Will check ammonia  Will check liver US (liver enzymes likely elevated to congestion)  CT brain as above  Neuro checks  Will admit to critical care           Hypoxia   Assessment & Plan     Patient requiring O2 nasal canula  CXR- congestion  Will diurese  Discussed with Critical care AP  Patient will go to step down level 2 for now  Unclear what her baseline is and what her clinical course will be overnight  Also patient is Full Code- confirmed with daughter at bedside           Elevated troponin   Assessment & Plan     Echo  Serial troponins  Will monitor on telemetry            SANTI (acute kidney injury) [facility])   Assessment & Plan     Patient has only one kidney  Unclear at this time what her baseline is  Will attempt diuresis as her volume status on exam is consistent with volume overload  Lasix 20 mg IV and consult nephrology and cardiology  Echo           Supratherapeutic INR   Assessment & Plan     Will reverse INR as I am not clear of her bleeding status at this time  Vitamin K   CT brain           * History of recent fall   Assessment & Plan     Patient has large bruise/hematology  Will check CT brain for now  Supratherpeutic INR                   VTE Prophylaxis: INR is supratherapeutic    / sequential compression device   Code Status: Full Code  POLST: There is no POLST form on file for this patient (pre-hospital)  Discussion with family: discussed with patient's daughter       Anticipated Length of Stay:  Patient will be admitted on an Inpatient basis with an anticipated length of stay of  More than 2 midnights     Justification for Hospital Stay: Encephalopathy           Admission Orders:  Inpatient/TStepDown  Continuous Cardiac Monitoring  Serial Cardiac Enzymes q3h x 3  Consult Renal r/e acute renal failure  Consult Cardiology r/e elevated troponin  Consult Neuro r/e altered mental status   Bilateral Sequential Compression Device  Blood/Urine Cultures  OT/PT Consult  Echocardiogram  Daily Weights  I&O    Scheduled Meds:   Current Facility-Administered Medications:  b complex-vitamin C-folic acid 1 capsule Oral Daily With Dinner   haloperidol lactate 2 mg Intravenous Once   hydrALAZINE 25 mg Oral TID   iron sucrose 300 mg Intravenous Daily   ketorolac 1 drop Left Eye 4x Daily   ofloxacin 1 drop Left Eye 4x Daily   prednisoLONE acetate 1 drop Left Eye 4x Daily     Valium 5 mg po x 2 thus far

## 2018-08-20 NOTE — PLAN OF CARE
Problem: PHYSICAL THERAPY ADULT  Goal: Performs mobility at highest level of function for planned discharge setting  See evaluation for individualized goals  Treatment/Interventions: LE strengthening/ROM, Therapeutic exercise, Endurance training, Cognitive reorientation, Patient/family training, Equipment eval/education, Bed mobility, Continued evaluation, Spoke to nursing  Equipment Recommended: Other (Comment) (TBD---will need mechanical conveyance for OOB @present)       See flowsheet documentation for full assessment, interventions and recommendations  Prognosis: Fair  Problem List: Decreased strength, Decreased range of motion, Decreased endurance, Impaired balance, Decreased mobility, Decreased coordination, Decreased cognition, Impaired vision, Obesity, Decreased skin integrity, Pain  Assessment: Pt is a 70 y o  female seen for PT evaluation [facility] w/ History of recent fall  Family shared transport of pt from [place] to [place]  Order placed for PT  Prior to admission, per daughter pt was using a walker, but no device over a month ago  Mac Toribio Upon evaluation: Pt requires dependent 1-2 person assistance for bed mobilty and is not appropriate for weight bearing transfers  Pt's clinical presentation is currently unstable/unpredictable given the functional mobility deficits above, especially weakness, decreased endurance and decreased functional mobility tolerance, coupled with fall risks including hx of falls, impaired balance, altered vision and decreased cognition, and combined with medical complications of bradycardia, multiple readmissions and new onset O2 use  Pt to benefit from continued skilled PT tx while in hospital and upon DC to address deficits as defined above and maximize level of functional mobility   From PT/mobility standpoint, recommendation at time of d/c would be inpatient rehab pending progress in order to maximize pt's functional independence and consistency w/ mobility in order to facilitate return to PLOF  Recommend trial with quick move next 1-2 sessions, ther ex next 1-2 sessions, mechanical conveyance from nursing standpoint for OOB mobility, OT consult and case management consult  Barriers to Discharge: Decreased caregiver support, Inaccessible home environment     Recommendation: Post acute IP rehab          See flowsheet documentation for full assessment

## 2018-08-20 NOTE — ASSESSMENT & PLAN NOTE
Patient has only one kidney  Creatinine is 2 81, slow trend upward  Daughter is eager for her to establish care with [facility]

## 2018-08-20 NOTE — PROGRESS NOTES
Progress Note - Josue Colin 1940, 70 y o  female MRN: [id number]    Unit/Bed#: -01 Encounter: [id number]    Primary Care Provider: No primary care provider on file  Date and time admitted to hospital: 8/18/2018  4:17 PM        Essential hypertension   Assessment & Plan    BP is 125/58   Will continue to trend   Iron deficiency anemia   Assessment & Plan    Will check B12 folate   Received venofer in am         Encephalopathy   Assessment & Plan    Improving  Responding, although confused  AAOx1  Hypoxia   Assessment & Plan    Patient requiring O2 nasal canula  Improving  May need O2 on DC  Elevated troponin   Assessment & Plan    [id number]  Echo -         SANTI (acute kidney injury) [facility])   Assessment & Plan    Patient has only one kidney  Creatinine is 2 81, slow trend upward  Daughter is eager for her to establish care with [facility]  Supratherapeutic INR   Assessment & Plan    Will reverse INR as I am not clear of her bleeding status at this time  INR is 2 54          * History of recent fall   Assessment & Plan    Patient has large bruise/hematology  CT brain - negative  Unclear why she fell  Needs PT/OT evaluation  VTE Pharmacologic Prophylaxis:   Pharmacologic: Heparin  Mechanical VTE Prophylaxis in Place: Yes    Patient Centered Rounds: I have performed bedside rounds with nursing staff today  Discussions with Specialists or Other Care Team Provider: Reviewed nephrology note  Education and Discussions with Family / Patient: discussed with patient and her daughter  Time Spent for Care: 30 minutes  More than 50% of total time spent on counseling and coordination of care as described above  Current Length of Stay: 2 day(s)    Current Patient Status: Inpatient   Certification Statement: The patient will continue to require additional inpatient hospital stay due to work up of anemia, SANTI and PT evaluation       Discharge Plan: Not medically stable for DC  Code Status: Level 1 - Full Code      Subjective:   Patient seen and examined  "I need to pee"     Confused, but improved when compared to yesterday  Objective:     Vitals:   Temp (24hrs), Av 9 °F (36 6 °C), Min:97 8 °F (36 6 °C), Max:97 9 °F (36 6 °C)    HR:  [53-56] 54  Resp:  [14-18] 18  BP: (118-125)/(53-61) 125/58  SpO2:  [98 %-100 %] 100 %  Body mass index is 27 28 kg/m²  Input and Output Summary (last 24 hours): Intake/Output Summary (Last 24 hours) at 18 1535  Last data filed at 18 1115   Gross per 24 hour   Intake              360 ml   Output             1100 ml   Net             -740 ml       Physical Exam:     Physical Exam   Constitutional: She appears well-developed  No distress  HENT:   Head: Normocephalic  Mouth/Throat: No oropharyngeal exudate  Eyes: Right eye exhibits no discharge  Left eye exhibits no discharge  No scleral icterus  Neck: Normal range of motion  No JVD present  No tracheal deviation present  No thyromegaly present  Cardiovascular: Normal rate  Exam reveals no gallop and no friction rub  No murmur heard  Pulmonary/Chest: No respiratory distress  She has no wheezes  She has no rales  She exhibits no tenderness  Abdominal: Soft  She exhibits no distension and no mass  There is no tenderness  There is no rebound and no guarding  Musculoskeletal: She exhibits no edema, tenderness or deformity  Lymphadenopathy:     She has no cervical adenopathy  Neurological: She is alert  She displays normal reflexes  No cranial nerve deficit  She exhibits normal muscle tone  Coordination normal    Skin: Skin is warm  No rash noted  She is not diaphoretic  No erythema  No pallor  Psychiatric: She has a normal mood and affect         Additional Data:     Labs:      Results from last 7 days  Lab Units 18  0345   WBC Thousand/uL 10 84*   HEMOGLOBIN g/dL 8 4*   HEMATOCRIT % 29 8*   PLATELETS Thousands/uL 230   NEUTROS PCT % 79*   LYMPHS PCT % 12*   MONOS PCT % 8   EOS PCT % 0       Results from last 7 days  Lab Units 08/20/18  0604 08/19/18  0534   SODIUM mmol/L 141 143   POTASSIUM mmol/L 3 9 4 6   CHLORIDE mmol/L 109* 111*   CO2 mmol/L 20* 22   BUN mg/dL 57* 54*   CREATININE mg/dL 2 81* 2 69*   CALCIUM mg/dL 7 8* 8 2*   TOTAL PROTEIN g/dL  --  5 8*   BILIRUBIN TOTAL mg/dL  --  1 60*   ALK PHOS U/L  --  238*   ALT U/L  --  413*   AST U/L  --  388*   GLUCOSE RANDOM mg/dL 97 89       Results from last 7 days  Lab Units 08/20/18  0628   INR  2 54*       Results from last 7 days  Lab Units 08/18/18  1634   POC GLUCOSE mg/dl 114             * I Have Reviewed All Lab Data Listed Above  * Additional Pertinent Lab Tests Reviewed: All [place] Admission Reviewed    Imaging:    Imaging Reports Reviewed Today Include:   US abdome -   "Unremarkable liver   No biliary obstruction   Small atrophic right kidney   Small ascites   Right pleural effusion "    Imaging Personally Reviewed by Myself Includes:  As above       Recent Cultures (last 7 days):       Results from last 7 days  Lab Units 08/18/18  1811   URINE CULTURE  >100,000 cfu/ml Enterococcus faecalis*       Last 24 Hours Medication List:     Current Facility-Administered Medications:  acetaminophen 650 mg Oral Q6H PRN Kayden Bryant MD    albuterol 2 puff Inhalation Q4H PRN Kayden Bryant MD    b complex-vitamin C-folic acid 1 capsule Oral Daily With [facility], PA-C    diazepam 5 mg Oral Q6H PRN Elvia Edge PA-C    haloperidol lactate 2 mg Intravenous Once BETTY Castillo    hydrALAZINE 25 mg Oral TID Xiomara Handley PA-C    iron sucrose 300 mg Intravenous Daily Xiomara Handley PA-C Last Rate: 300 mg (08/20/18 1316)   ketorolac 1 drop Left Eye 4x Daily Kayden Bryant MD    menthol-methyl salicylate  Apply externally 4x Daily PRN Vitaly Diaz PA-C    ofloxacin 1 drop Left Eye 4x Daily Kayden Bryant MD    prednisoLONE acetate 1 drop Left Eye 4x Daily Katy Saunders MD         Today, Patient Was Seen By: Katy Saunders MD    ** Please Note: Dictation voice to text software may have been used in the creation of this document   **

## 2018-08-21 PROBLEM — N39.0 UTI (URINARY TRACT INFECTION): Status: ACTIVE | Noted: 2018-01-01

## 2018-08-21 PROBLEM — I50.43 ACUTE ON CHRONIC COMBINED SYSTOLIC (CONGESTIVE) AND DIASTOLIC (CONGESTIVE) HEART FAILURE (HCC): Status: ACTIVE | Noted: 2018-01-01

## 2018-08-21 NOTE — OCCUPATIONAL THERAPY NOTE
[address] Evaluation     Patient Name: Himanshu Loyd  LYHTL'E Date: 8/21/2018  Problem List  Patient Active Problem List   Diagnosis    History of recent fall    Supratherapeutic INR    SANTI (acute kidney injury) ([address] )    Elevated troponin    Hypoxia    Encephalopathy    Iron deficiency anemia    Low bicarbonate level    Elevated serum creatinine    Essential hypertension    UTI (urinary tract infection)    Acute on chronic combined systolic (congestive) and diastolic (congestive) heart failure (HCC)     Past Medical History  Past Medical History:   Diagnosis Date    Cardiac disease     Hypertension      Past Surgical History  Past Surgical History:   Procedure Laterality Date    CORONARY ANGIOPLASTY WITH STENT PLACEMENT        08/21/18 1245   Note Type   Note type Eval only   Restrictions/Precautions   Weight Bearing Precautions Per Order No   Other Precautions Cognitive; Bed Alarm;Multiple lines;O2;Telemetry; Fall Risk;Pain   Pain Assessment   Pain Assessment 0-10   Pain Score 8   Pain Location Abdomen   Pain Frequency Constant/continuous   Pain Onset Ongoing   Clinical Progression (Intermittent pain)   Effect of Pain on Daily Activities Limits mobility and activity tolerance   Patient's Stated Pain Goal No pain   Hospital Pain Intervention(s) Repositioned; Emotional support   Response to Interventions Session limited to pain and fatigue   Home Living   Type of Home House  (Tentative to live w/ dtr)   Home Layout Two level;Stairs to enter with rails; Performs ADLs on one level; Able to live on main level with bedroom/bathroom  (4+5 MELITON )   Bathroom Toilet Standard   Bathroom Equipment Shower chair;Commode   Bathroom Accessibility Accessible   Home Equipment Walker;Cane   Additional Comments Pt from [place] where she would stay w/ many different family members  Dtr (present) reported pt will be living w/ her w/ main floor setup     Prior Function   Level of [place] Independent with ADLs and functional mobility  (A w/ bathing)   L[facility] With [facility] Help From Family   ADL Assistance (A w/ bathing)   IADLs Needs assistance   Falls in the last 6 months 1 to 4  (Unable to give number >2)   Vocational Retired   Comments I w/ functional mobility and dressing   Lifestyle   Autonomy Pt visited many relatives frequently and dtr reports that pt was constantly active prior to most recent fall  Pt I w/ dressing and functional mobility at baseline   Reciprocal Relationships Supportive dtr and family   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"   Intrinsic Gratification Enjoys visiting family   Psychosocial   Psychosocial (WDL) X   Patient Behaviors/Mood Flat affect   Length of Time/Family Visitation (Dtr + other family member present)   ADL   Eating Assistance 2  Maximal Assistance   Eating Deficit Setup; Increased time to complete;Bringing food to mouth assist;Beverage management  (Dtr A w/ eating)   Additional Comments Limited 2* fatigue   Bed Mobility   Supine to Sit 1  Dependent   Additional items Assist x 1; Increased time required;LE management;Verbal cues;HOB elevated   Sit to Supine 1  Dependent   Additional items Assist x 2;HOB elevated; Increased time required;Verbal cues;LE management  (Physical A to control trunk descend )   Additional Comments Pt demonstrated sever lethargy and weakness   Transfers   Sit to Stand Unable to assess  (Pt demonstrated sever lethargy and weakness)   Functional Mobility   Functional Mobility (Pt demonstrated sever lethargy and weakness)   Additional Comments Post eval, pt was supine in bed, eating w/ A from dtr, needs met, call bell in reach, alarm activated, family present   Additional items (Dtr reports pt to have sat on EOB for 5 minutes)   Balance   Static Sitting Poor -   Activity Tolerance   Activity Tolerance Patient limited by fatigue   Medical Staff Made Aware PT Lesli Phelps   Nurse Made Aware  [facility] Assessment WFL   RUE Strength   RUE Overall Strength Deficits   Edema   RUE Edema (Observed)   LUE Assessment   LUE Assessment X   LUE Overall AROM   L Shoulder Flexion No AROM   L Shoulder ABduction No AROM   L Elbow Flexion WFL   L Elbow Extension WFL   L Mass Grasp WFL   LUE Overall PROM   L Shoulder Flexion > 30 degrees 2* pain   LUE Strength   LUE Overall Strength Deficits  (Significant weakness in all movements)   Edema   LUE Edema (Observed)   Hand Function   Gross Motor Coordination Functional  (Bradykinetic movements)   Fine Motor Coordination Impaired  (Able to maintain grasp)   Sensation   Light Touch No apparent deficits   Proprioception   Proprioception No apparent deficits   Perception   Inattention/Neglect Cues to attend to left side of body  (Initially would not move L UE when asked)   Motor Planning Appears intact   Perseveration Not present   Cognition   Overall Cognitive Status Impaired   Arousal/Participation Lethargic;Poorly responsive   Attention Within functional limits   Orientation Level Oriented to person   Memory Decreased recall of recent events;Decreased long term memory;Decreased short term memory   Following Commands Follows one step commands inconsistently   Comments Pt identified by full name and   Pt participated in limited conversation, able to report on work hx  Pt inacurately reported where she lives as well as relationship to family memeber present  Pt presented confused and ? historian  Assessment   Limitation Decreased ADL status; Decreased UE ROM; Decreased UE strength;Decreased cognition;Decreased endurance;Decreased self-care trans  (Significant lethargy)   Assessment Pt is a(n) 78 yof [facility] on 18 for Encephalopathy  HPI: Pt was experiencing b/l arm and leg swelling while on a trip from [place] along w/ difficulty breathing  Pt presents w/ the following PMH impacting occupational performance: Cardiac Disease and HTN   Pt dtr reports pt will be living in a [facility] w/ 4+5 MELITON and will have a main level setup  PTA pt uses RW or SPC for functional mobility  Pt reports I w/ dressing and functional mobility and A w I/ADLs at baseline  Upon evaluation, pt participated in limited conversation and able to report on work hx  Pt inaccurately reported where she lives as well as relationship to family memeber present  Pt also followed 1 step directions inconsistently  Pt complete supine to sit to EOB w/ dependent A x 1, to complete sit > supine required dependent A x 2  Both transfers required VC + time + leg mgmt + HOB elevation  Session was significantly limited 2* lethargy, weakness, and pain  Pt presents w/ decreased siting tolerance, static sitting balance, cognition, UE ROM, UE strength, FMC, endurance, activity tolerance, generalized weakness, and increased pain; impacting I w/ functional mobility/transfers, UB dressing, LB dressing, eating, toileting, bathing, community mobility, bed mobility, grooming, and activity engagement  While in acute care pt would benefit from OT services to address deficit areas  From an OT perspective, pt would benefit from post acute rehab when medically stable for discharge to return home w/ dtr  Will continue to follow   Goals   Patient Goals Not stated   Plan   Treatment Interventions ADL retraining;Functional transfer training;UE strengthening/ROM; Endurance training;Patient/family training;Equipment evaluation/education;Continued evaluation; Activityengagement   Goal Expiration Date 08/28/18   OT Frequency 3-5x/wk   Recommendation   OT Discharge Recommendation (Post Acute Rehab)   OT - OK to Discharge (When medically stable)   Barthel Index   Feeding 0   Bathing 0   Grooming Score 0   Dressing Score 0   Bladder Score 0   Bowels Score 10   Toilet Use Score 0   Transfers (Bed/Chair) Score 0   Mobility (Level Surface) Score 0   Stairs Score 0   Barthel Index Score 10   Modified Pickens Scale   Modified Pickens Scale 5       Pt goals to be met within 7 days:    1  Pt will complete bed mobility supine <> sit w/ mod A x 1 to engage in bed level ADLs  2  Pt will demonstrate fair - static seated balance at EOB during oral hygiene to increase activity engagement  3  Pt will increase sitting tolerance to 15 minutes while demonstrating fair - balance to max I w/ ADL performance at seated level  4  Pt will demonstrate increased activity tolerance to 15 minutes while engaged in grooming w/ use of B UE to max I   5  Pt will demonstrate good attention and participation in continued evaluation to assess functional transfers to assist in appropriate discharge planning  6  Pt will demonstrate good attention and participation in continued evaluation to assess ADL performance to assist in appropriate discharge planning  7  Pt will demonstrate 2+/5 strength of the L UE to increase ADL performance while in the home and decrease burden of care  8   Pt will complete eating at seated level w/ S 2* setup to max I and increase activity engagement    Anjum Sims , OTS

## 2018-08-21 NOTE — PROGRESS NOTES
[facility]    Progress Note - Diego Myers 1940, 70 y o  female MRN: [id number]    Unit/Bed#: -01 Encounter: [id number]    Primary Care Provider: No primary care provider on file  Date and time admitted to hospital: 8/18/2018  4:17 PM    * Encephalopathy   Assessment & Plan    · Improving, A&O x 3 with some short term deficits/forgetful at baseline per daughter  · ? 2/2 UTI  · Safety precautions, supportive care  · IV abx  · Additional plan as below         History of recent fall   Assessment & Plan    · Patient has large bruise/hematology  · CT brain - negative  · Unclear why she fell, ? Weakness 2/2 UTI, worsening renal function, anemia  Likely multifactorial   · PT/OT recommending inpatient rehab  Needs PT/OT evaluation  UTI (urinary tract infection)   Assessment & Plan    · UA positive, culture currently growing enterococcus faecalis  No clear history of VRE  Will continue with Vanco for now pending final culture/susceptibilities  · Has been afebrile, mild leukocytosis noted yesterday, no CBC today will repeat in AM    · Still encephalopathic  · BC pending  SANTI (acute kidney injury) [facility])   Assessment & Plan    · Patient has only one kidney  Nephrology is following  Unsure of baseline as patient is from [place]  · Creatinine slightly improved today, 2 62  She has been stable in house with creatinine 2 6-2 8, possibly her baseline? · Appreciate additional nephrology recommendations  · Nephrocaps started by nephrology  · Continue to monitor PVR Qshift and follow retention protocol  · Daily BMP  · No nephrotoxins  · Will need outpatient follow up          Acute on chronic combined systolic (congestive) and diastolic (congestive) heart failure (HCC)   Assessment & Plan    · S/P IV lasix, now on oral as of today 20 mg PO BID  · Echo showing, EF 40%, G3DD, RV moderately dilated  Mild mitral disease, moderate aortic   No old echo for comparison [place]  · Will need outpatient cardiology follow up  · I/O, daily weights, low NA diet  · Monitor volume status  · Not on BB  Some bradycardia noted, monitor  No ACEI 2/2 CKD  · Leg edema improved, doppler is pending  LUE doppler pending as well  · Appreciate additional cardiology recommendations  Hypoxia   Assessment & Plan    · Patient requiring O2 nasal canula, will have nursing staff check patient on RA, could have been 2/2 volume overload, cxr with left pleural effustion, no pna  Does not wear o2 at home  · Incentive spirometer   · Monitor           Elevated troponin   Assessment & Plan    · 0 96/1 12/1 04/ 0 77  · No chest pain  · Echo as above  · Cardiology following, likely type 2 NSTEMI 2/2 acute CHF        Essential hypertension   Assessment & Plan    · Bp acceptable  · Continue hydralazine TID and oral lasix  · Monitor         Iron deficiency anemia   Assessment & Plan    · Folate pending  B12 ok  Iron panel showing iron deficiency, hemoglobin has been stable in the mid 8's  Plan is for patient to get Venofer x 3 doses  Would recommend oral iron at discharge  · No s/s obvious bleeding despite coagulopathy  · Monitor         Supratherapeutic INR   Assessment & Plan    · Not on Coumadin, but is on Eliquis usually to DVT appx 4 which has been on hold  Likely 2/2 congestive hepatopathy given transaminitis as well  LFT's have trended down  · Abdominal US showing ascites, no liver abnormalities or biliary obstruction  · INR 2 54 yesterday, will repeat tomorrow  Pharmacologic: Pharmacologic VTE Prophylaxis contraindicated due to anemia, elevated INR  Mechanical VTE Prophylaxis in Place: No    Patient Centered Rounds: I have performed bedside rounds with nursing staff today      Discussions with Specialists or Other Care Team Provider: nursing, case management     Education and Discussions with Family / Patient: Patient and daughter over the phone    Time Spent for Care: 45 minutes  More than 50% of total time spent on counseling and coordination of care as described above  Current Length of Stay: 3 day(s)    Current Patient Status: Inpatient   Certification Statement: The patient will continue to require additional inpatient hospital stay due to IV abx, monitor labs, final urine culture, additional renal and cardiology recomendations  Discharge Plan / Estimated Discharge Date: Not medically stable  To rehab once stable  Code Status: Level 1 - Full Code      Subjective:   Patient complains of feeling nauseous  Complains of some left arm pain  Denies any dysuria, frequency or urgency  Denies any chest pain or shortness of breath  Objective:     Vitals:   Temp (24hrs), Av 9 °F (36 6 °C), Min:97 7 °F (36 5 °C), Max:98 1 °F (36 7 °C)    HR:  [54-60] 60  Resp:  [18] 18  BP: (125-153)/(58-68) 144/65  SpO2:  [97 %-100 %] 98 %  Body mass index is 26 49 kg/m²  Input and Output Summary (last 24 hours): Intake/Output Summary (Last 24 hours) at 18 0951  Last data filed at 18 0101   Gross per 24 hour   Intake                0 ml   Output              623 ml   Net             -623 ml       Physical Exam:     Physical Exam   Constitutional: No distress  Chronically ill appearing  HENT:   Head: Normocephalic  Eyes: Pupils are equal, round, and reactive to light  Neck: Normal range of motion  No JVD present  Cardiovascular: Regular rhythm and intact distal pulses  No murmur heard  Bradycardic in 60's   Pulmonary/Chest: She has decreased breath sounds  She has no wheezes  She has no rhonchi  Very poor effort    Abdominal: Soft  Bowel sounds are normal  She exhibits no distension  There is no tenderness  Musculoskeletal: Normal range of motion  She exhibits edema (left arm) and tenderness (left arm)  Neurological: She is alert  Oriented to person and place    Skin: Skin is warm and dry     Multiples areas of ecchymosis  Left forearm, erythema  Psychiatric:   Tearful    Nursing note and vitals reviewed  Additional Data:     Labs:      Results from last 7 days  Lab Units 08/19/18  0345   WBC Thousand/uL 10 84*   HEMOGLOBIN g/dL 8 4*   HEMATOCRIT % 29 8*   PLATELETS Thousands/uL 230   NEUTROS PCT % 79*   LYMPHS PCT % 12*   MONOS PCT % 8   EOS PCT % 0       Results from last 7 days  Lab Units 08/21/18  0640  08/19/18  0534   SODIUM mmol/L 143  < > 143   POTASSIUM mmol/L 4 1  < > 4 6   CHLORIDE mmol/L 109*  < > 111*   CO2 mmol/L 22  < > 22   BUN mg/dL 54*  < > 54*   CREATININE mg/dL 2 62*  < > 2 69*   CALCIUM mg/dL 8 0*  < > 8 2*   TOTAL PROTEIN g/dL  --   --  5 8*   BILIRUBIN TOTAL mg/dL  --   --  1 60*   ALK PHOS U/L  --   --  238*   ALT U/L  --   --  413*   AST U/L  --   --  388*   GLUCOSE RANDOM mg/dL 79  < > 89   < > = values in this interval not displayed      Results from last 7 days  Lab Units 08/20/18  0628   INR  2 54*         Recent Cultures (last 7 days):       Results from last 7 days  Lab Units 08/18/18  1811   URINE CULTURE  >100,000 cfu/ml Enterococcus faecalis*       Last 24 Hours Medication List:     Current Facility-Administered Medications:  acetaminophen 650 mg Oral Q6H PRN Kat Fu MD    albuterol 2 puff Inhalation Q4H PRN Kat Fu MD    b complex-vitamin C-folic acid 1 capsule Oral Daily With [facility], BRENDA    diazepam 5 mg Oral Q6H PRN Sukumar Arvizu PA-C    furosemide 20 mg Oral BID (diuretic) BETTY Pozo    haloperidol lactate 2 mg Intravenous Once BETTY Marcos    hydrALAZINE 25 mg Oral TID Xiomara Handley PA-C    iron sucrose 300 mg Intravenous Daily Xiomara Handley PA-C Last Rate: 300 mg (08/20/18 1316)   ketorolac 1 drop Left Eye 4x Daily Kat Fu MD    menthol-methyl salicylate  Apply externally 4x Daily PRN Vitaly Vanegas PA-C    ofloxacin 1 drop Left Eye 4x Daily Kat Fu MD    prednisoLONE acetate 1 drop Left Eye 4x Daily Philomena Marshall MD    vancomycin 12 5 mg/kg Intravenous Q24H BETTY Hamilton         Today, Patient Was Seen By: BETTY Green    ** Please Note: Dragon 360 Dictation voice to text software may have been used in the creation of this document   **

## 2018-08-21 NOTE — ASSESSMENT & PLAN NOTE
· S/P IV lasix, now on oral as of today 20 mg PO BID  · Echo showing, EF 40%, G3DD, RV moderately dilated  Mild mitral disease, moderate aortic  No old echo for comparison [place]  · Will need outpatient cardiology follow up  · I/O, daily weights, low NA diet  · Monitor volume status  · Not on BB  Some bradycardia noted, monitor  No ACEI 2/2 CKD  · Leg edema improved, doppler is pending  LUE doppler pending as well  · Appreciate additional cardiology recommendations

## 2018-08-21 NOTE — ASSESSMENT & PLAN NOTE
· Patient requiring O2 nasal canula, will have nursing staff check patient on RA, could have been 2/2 volume overload, cxr with left pleural effustion, no pna  Does not wear o2 at home    · Incentive spirometer   · Monitor

## 2018-08-21 NOTE — PLAN OF CARE
Problem: SLP ADULT - SWALLOWING, IMPAIRED  Goal: Initial SLP swallow eval performed  Outcome: Completed Date Met: 08/21/18

## 2018-08-21 NOTE — ASSESSMENT & PLAN NOTE
· UA positive, culture currently growing enterococcus faecalis  No clear history of VRE  Will continue with Vanco for now pending final culture/susceptibilities  · Has been afebrile, mild leukocytosis noted yesterday, no CBC today will repeat in AM    · Still encephalopathic  · BC pending

## 2018-08-21 NOTE — PROGRESS NOTES
Cardiology Progress Note - Kimberly Block 70 y o  female MRN: [id number]    Unit/Bed#: -01 Encounter: [id number]      Assessment:  1  Acute on chronic combined CHF  2  CAD s/p CABG - details and anatomy unknown  3  Change in mental status  4  Elevated creatinine - unclear baseline   5  Hypertension   6  Dyslipidemia   7  Moderate aortic stenosis, mild to moderate aortic regurgitation  8  Pulmonary hypertension - likely group 2/3  9  Type 2 NSTEMI     Plan:  1   Echo noted - no old for comparison - daughter unsure of name of providers in [place]   2  Volume status appears stable - s/p few doses of IV Lasix  3  BMP with improved renal function - oral Lasix resumed, weight down   4  Tele with heart rates 50s - no pauses or high grade AV block, continue to monitor  5  Given CKD and underlying co-morbidities - no further invasive cardiac work-up; discussed with daughter who is in agreement    Subjective:   Patient seen and examined  No significant events overnight  Objective:     Vitals: Blood pressure 144/65, pulse 60, temperature 98 1 °F (36 7 °C), temperature source Oral, resp  rate 18, height 5' 4" (1 626 m), weight 70 kg (154 lb 5 2 oz), SpO2 98 %  , Body mass index is 26 49 kg/m² ,   Orthostatic Blood Pressures      Most Recent Value   Blood Pressure  144/65 filed at 08/21/2018 0808   Patient Position - Orthostatic VS  Lying filed at 08/21/2018 9538            Intake/Output Summary (Last 24 hours) at 08/21/18 1449  Last data filed at 08/21/18 1429   Gross per 24 hour   Intake                0 ml   Output              348 ml   Net             -348 ml           Physical Exam:    GEN: Kimberly Block appears chronically ill, lethargic but arousable  HEENT: pupils equal, round, and reactive to light; extraocular muscles intact  NECK: supple, no carotid bruits, no obvious elevated JVP  HEART: regular rhythm, normal S1 and S2, 2/6 mid peaking SEJAL RUSB   LUNGS: decreased breath sounds bilaterally   ABDOMEN: normal bowel sounds, soft, no tenderness, no distention  EXTREMITIES: peripheral pulses normal; no clubbing, cyanosis, + edema  NEURO: no focal findings   SKIN: normal without suspicious lesions on exposed skin    Medications:      Current Facility-Administered Medications:     acetaminophen (TYLENOL) tablet 650 mg, 650 mg, Oral, Q6H PRN, Srinath Bautista MD, 650 mg at 08/21/18 1421    albuterol (PROVENTIL HFA,VENTOLIN HFA) inhaler 2 puff, 2 puff, Inhalation, Q4H PRN, Srinath Bautista MD    ampicillin (OMNIPEN) 2,000 mg in sodium chloride 0 9 % 100 mL IVPB, 2,000 mg, Intravenous, Q6H, BETTY Pozo    b complex-vitamin C-folic acid (NEPHROCAPS) capsule 1 capsule, 1 capsule, Oral, Daily With Dinner, Brigitte Larson PA-C    diazepam (VALIUM) tablet 5 mg, 5 mg, Oral, Q6H PRN, Hodan Coleman PA-C, 5 mg at 08/20/18 1458    furosemide (LASIX) tablet 20 mg, 20 mg, Oral, BID (diuretic), BETTY Pozo, 20 mg at 08/21/18 1013    haloperidol lactate (HALDOL) injection 2 mg, 2 mg, Intravenous, Once, BETTY Iglesias, Stopped at 08/18/18 2218    hydrALAZINE (APRESOLINE) tablet 25 mg, 25 mg, Oral, TID, Xiomara Handley PA-C, 25 mg at 08/21/18 1013    ketorolac (ACULAR) 0 5 % ophthalmic solution 1 drop, 1 drop, Left Eye, 4x Daily, Srinath Bautista MD, 1 drop at 08/21/18 1422    menthol-methyl salicylate (BENGAY) 81-39 % cream, , Apply externally, 4x Daily PRN, Cara Redding PA-C, 1 application at 10/28/92 2023    ofloxacin (OCUFLOX) 0 3 % ophthalmic solution 1 drop, 1 drop, Left Eye, 4x Daily, Srinath Bautista MD, 1 drop at 08/21/18 1422    ondansetron (ZOFRAN) injection 4 mg, 4 mg, Intravenous, Q6H PRN, BETTY Pozo    prednisoLONE acetate (PRED FORTE) 1 % ophthalmic suspension 1 drop, 1 drop, Left Eye, 4x Daily, Srinath Bautista MD, 1 drop at 08/21/18 1422     Labs & Results:      Results from last 7 days  Lab Units 08/19/18  0345 08/19/18  0117 08/18/18  2200 TROPONIN I ng/mL 0 77* 1 04* 1 12*       Results from last 7 days  Lab Units 08/19/18  0345 08/18/18  1745 08/18/18  1705   WBC Thousand/uL 10 84* 9 66  --    HEMOGLOBIN g/dL 8 4* 8 1*  --    I STAT HEMOGLOBIN g/dl  --   --  10 2*   HEMATOCRIT % 29 8* 28 4* 30*   PLATELETS Thousands/uL 230 291  --            Results from last 7 days  Lab Units 08/21/18  0640 08/20/18  0604 08/19/18  0534  08/18/18  1745   SODIUM mmol/L 143 141 143  < > 142   POTASSIUM mmol/L 4 1 3 9 4 6  < > 5 3   CHLORIDE mmol/L 109* 109* 111*  < > 109*   CO2 mmol/L 22 20* 22  < > 22   BUN mg/dL 54* 57* 54*  < > 51*   CREATININE mg/dL 2 62* 2 81* 2 69*  < > 2 70*   CALCIUM mg/dL 8 0* 7 8* 8 2*  < > 8 3   TOTAL PROTEIN g/dL  --   --  5 8*  --  6 0*   BILIRUBIN TOTAL mg/dL  --   --  1 60*  --  1 80*   ALK PHOS U/L  --   --  238*  --  216*   ALT U/L  --   --  413*  --  429*   AST U/L  --   --  388*  --  473*   GLUCOSE RANDOM mg/dL 79 97 89  < > 155*   < > = values in this interval not displayed  Results from last 7 days  Lab Units 08/20/18  0628 08/18/18  1745   INR  2 54* 5 65*   PTT seconds  --  45*       Results from last 7 days  Lab Units 08/19/18  0345   MAGNESIUM mg/dL 2 4       Counseling / Coordination of Care  Total floor / unit time spent today 20 minutes  Greater than 50% of total time was spent with the patient and / or family counseling and / or coordination of care

## 2018-08-21 NOTE — ASSESSMENT & PLAN NOTE
· Folate pending  B12 ok  Iron panel showing iron deficiency, hemoglobin has been stable in the mid 8's  Plan is for patient to get Venofer x 3 doses  Would recommend oral iron at discharge  · No s/s obvious bleeding despite coagulopathy     · Monitor

## 2018-08-21 NOTE — PROGRESS NOTES
Progress Note - Nephrology   Dang Player 70 y o  female MRN: [id number]  Unit/Bed#: -Xu Encounter: [id number]    Assessment:  1  Elevated creatinine level:  As of today August 21st, her creatinine is decreased to 2 6 from 2 8 on admission  Not sure what her baseline is  Likely some degree of chronic kidney disease as she has an atrophic right kidney is 6 4 cm left kidney is 10 cm  She has an essentially a solitary functioning left kidney  There may be some degree of ATN however again need old records from [place]  2   Low bicarbonate level: This has improved bicarbonate level is 22; continue to follow    3  Fluid overload: This is improved agree with changing to oral diuretics appreciate Cardiology input  Patient is on Lasix 20 mg twice daily  With regards to edema there is no evidence of any DVT on left upper extremity or lower extremity Dopplers  I relayed this information to the patient's daughter  4   Anemia:  Multifaceted in addition to anemia of chronic kidney disease also iron deficiency  Patient is being treated for urinary tract infection hold off on further intravenous iron right now  Patient started on Nephrocaps  Plan:  Recheck labs in a m     Follow-up in old records I spoke with patient's daughter at bedside      Subjective:   Patient seen in follow-up for acute kidney injury on chronic kidney disease baseline creatinine is unknown  I had the opportunity to speak with the patient's daughter at bedside  Patient is less lethargic today  She seems to be talking a little bit more  Objective:     Vitals: Blood pressure 144/65, pulse 60, temperature 98 1 °F (36 7 °C), temperature source Oral, resp  rate 18, height 5' 4" (1 626 m), weight 70 kg (154 lb 5 2 oz), SpO2 98 %  ,Body mass index is 26 49 kg/m²      Weight (last 2 days)     Date/Time   Weight    08/21/18 0600  70 (154 32)    Weight: patient unable to stand steady at 08/21/18 0600    08/20/18 0600  72 1 (158 95)    08/19/18 1435  72 5 (159 83)    08/19/18 0613  72 5 (159 83)                Intake/Output Summary (Last 24 hours) at 08/21/18 1434  Last data filed at 08/21/18 1429   Gross per 24 hour   Intake                0 ml   Output              348 ml   Net             -348 ml            Physical Exam: General: patient is in NAD  Skin:  No new rash noted  Eyes:  No scleral icterus; there is ecchymosis noted on the right arm  Neck:  Supple no adenopathy  Chest:  Coarse breath sounds  CVS:  S1-S2  Abdomen:  Positive bowel sounds  Extremities:   On left upper extremity edema compared to the left there is mild bilateral leg edema  Neuro:  Nonfocal                Prescriptions Prior to Admission   Medication    albuterol (ACCUNEB) 1 25 MG/3ML nebulizer solution    apixaban (ELIQUIS) 5 mg    furosemide (LASIX) 20 mg tablet    glipiZIDE (GLUCOTROL) 5 mg tablet    hydrALAZINE (APRESOLINE) 25 mg tablet    ketorolac (ACULAR) 0 5 % ophthalmic solution    ofloxacin (OCUFLOX) 0 3 % ophthalmic solution    prednisoLONE acetate (PRED FORTE) 1 % ophthalmic suspension    albuterol (PROVENTIL HFA,VENTOLIN HFA) 90 mcg/act inhaler       Current Facility-Administered Medications   Medication Dose Route Frequency    acetaminophen (TYLENOL) tablet 650 mg  650 mg Oral Q6H PRN    albuterol (PROVENTIL HFA,VENTOLIN HFA) inhaler 2 puff  2 puff Inhalation Q4H PRN    ampicillin (OMNIPEN) 2,000 mg in sodium chloride 0 9 % 100 mL IVPB  2,000 mg Intravenous Q6H    b complex-vitamin C-folic acid (NEPHROCAPS) capsule 1 capsule  1 capsule Oral Daily With Dinner    diazepam (VALIUM) tablet 5 mg  5 mg Oral Q6H PRN    furosemide (LASIX) tablet 20 mg  20 mg Oral BID (diuretic)    haloperidol lactate (HALDOL) injection 2 mg  2 mg Intravenous Once    hydrALAZINE (APRESOLINE) tablet 25 mg  25 mg Oral TID    iron sucrose (VENOFER) 300 mg in sodium chloride 0 9 % 250 mL IVPB  300 mg Intravenous Daily    ketorolac (ACULAR) 0 5 % ophthalmic solution 1 drop  1 drop Left Eye 4x Daily    menthol-methyl salicylate (BENGAY) 20-52 % cream   Apply externally 4x Daily PRN    ofloxacin (OCUFLOX) 0 3 % ophthalmic solution 1 drop  1 drop Left Eye 4x Daily    ondansetron (ZOFRAN) injection 4 mg  4 mg Intravenous Q6H PRN    prednisoLONE acetate (PRED FORTE) 1 % ophthalmic suspension 1 drop  1 drop Left Eye 4x Daily        Lab, Imaging and other studies: I have personally reviewed pertinent labs    CBC: Lab Results   Component Value Date    WBC 10 84 (H) 08/19/2018    RBC 3 50 (L) 08/19/2018     CMP: Lab Results   Component Value Date     08/21/2018     (H) 08/21/2018    CO2 22 08/21/2018    ANIONGAP 12 08/21/2018    BUN 54 (H) 08/21/2018    CREATININE 2 62 (H) 08/21/2018    GLUCOSE 79 08/21/2018    GLUCOSE 122 08/18/2018    CALCIUM 8 0 (L) 08/21/2018     (H) 08/19/2018     (H) 08/19/2018    ALKPHOS 238 (H) 08/19/2018    PROT 5 8 (L) 08/19/2018    BILITOT 1 60 (H) 08/19/2018    EGFR 17 08/21/2018    EGFR 17 08/18/2018     Phosphorus:   Lab Results   Component Value Date    PHOS 4 8 (H) 08/19/2018     Magnesium:   Lab Results   Component Value Date    MG 2 4 08/19/2018     Urinalysis: Lab Results   Component Value Date    COLORU Yellow 08/19/2018    CLARITYU Slightly Cloudy 08/19/2018    SPECGRAV 1 015 08/19/2018    PHUR 5 0 08/19/2018    LEUKOCYTESUR Small (A) 08/19/2018    NITRITE Negative 08/19/2018    PROTEINUA Negative 08/19/2018    GLUCOSEU Negative 08/19/2018    KETONESU Negative 08/19/2018    BILIRUBINUR Negative 08/19/2018    BLOODU Small (A) 08/19/2018     BMP: Lab Results   Component Value Date    GLUCOSE 79 08/21/2018    GLUCOSE 122 08/18/2018    CO2 22 08/21/2018    BUN 54 (H) 08/21/2018    CREATININE 2 62 (H) 08/21/2018    CALCIUM 8 0 (L) 08/21/2018

## 2018-08-21 NOTE — PHYSICIAN ADVISOR
Current patient class: Inpatient  The patient is currently on Hospital Day: 4 at [facility]      The patient was admitted to the hospital at [address] [place] [address] on 8/18/18 for the following diagnosis:  Leg pain [M79 606]  Altered mental status [R41 82]  Hypoxia [R09 02]  Acute renal failure (ARF) (HCC) [N17 9]  Supratherapeutic INR [R79 1]  Elevated troponin I level [R74 8]       There is documentation in the medical record of an expected length of stay of at least 2 midnights  The patient is therefore expected to satisfy the 2 midnight benchmark and given the 2 midnight presumption is appropriate for INPATIENT ADMISSION  Given this expectation of a satisfying stay, CMS instructs us that the patient is most often appropriate for inpatient admission under part A provided medical necessity is documented in the chart  After review of the relevant documentation, labs, vital signs and test results, the patient is appropriate for INPATIENT ADMISSION  Admission to the hospital as an inpatient is a complex decision making process which requires the practitioner to consider the patients presenting complaint, history and physical examination and all relevant testing  With this in mind, in this case, the patient was deemed appropriate for INPATIENT ADMISSION  After review of the documentation and testing available at the time of the admission I concur with this clinical determination of medical necessity  Rationale is as follows: The patient is a 70 yrs old Female who presented to the ED at 8/18/2018  4:17 PM with a chief complaint of Leg Swelling (Pt presents to ED from home w/ bilateral leg swelling, and arm swelling, with weeping  Pt (+) extensive health hx  Pt d/c'ed from hospital 4 days ago from falling )     Patient admitted with a report of altered mental status and difficulty breathing  It is noted that she had a recent fall and was [place] hospital [place] state    She was brought to [facility] due her worsening condition per her daughter  The patient does have a past history of CHF  Abnormal labs on this admission include a troponin of 1 12, hemoglobin of 8 1, INT of 5 65, creatinine of 2 70, lactate of 2 2 and a BNP of 118,415  She was seen in consult by Cardiology and they agreed that the patient was in CHF and also had a NSTEMI type 2  Patient was also seen by Nephrology and they recommended a renal US  The patient was initially encephalopathic with hypoxia  Further testing is ongoing and a two night admission status to the hospital would be considered appropriate for her      The patients vitals on arrival were ED Triage Vitals   Temperature Pulse Respirations Blood Pressure SpO2   08/18/18 1625 08/18/18 1625 08/18/18 1625 08/18/18 1625 08/18/18 1625   (!) 97 4 °F (36 3 °C) 60 14 148/64 (!) 82 %      Temp Source Heart Rate Source Patient Position - Orthostatic VS BP Location FiO2 (%)   08/18/18 1625 08/18/18 1625 08/18/18 1625 08/18/18 1625 --   Rectal Monitor Lying Right arm       Pain Score       08/18/18 1749       No Pain           Past Medical History:   Diagnosis Date    Cardiac disease     Hypertension      Past Surgical History:   Procedure Laterality Date    CORONARY ANGIOPLASTY WITH STENT PLACEMENT             Consults have been placed to:   IP CONSULT TO NEPHROLOGY  IP CONSULT TO CARDIOLOGY    Vitals:    08/20/18 2023 08/20/18 2220 08/21/18 0600 08/21/18 0808   BP: 148/67 153/68  144/65   BP Location:  Right arm  Right arm   Pulse:  58  60   Resp:  18  18   Temp:  97 7 °F (36 5 °C)  98 1 °F (36 7 °C)   TempSrc:  Oral  Oral   SpO2:  97%  98%   Weight:   70 kg (154 lb 5 2 oz)    Height:           Most recent labs:    Recent Labs      08/18/18   1745   08/19/18   0345  08/19/18   0534   08/20/18   0628  08/21/18   0640   WBC  9 66   --   10 84*   --    --    --    --    HGB  8 1*   --   8 4*   --    --    --    --    HCT  28 4*   --   29 8*   --    --    --    -- PLT  291   --   230   --    --    --    --    K  5 3   < >   --   4 6   < >   --   4 1   NA  142   < >   --   143   < >   --   143   CALCIUM  8 3   < >   --   8 2*   < >   --   8 0*   BUN  51*   < >   --   54*   < >   --   54*   CREATININE  2 70*   < >   --   2 69*   < >   --   2 62*   INR  5 65*   --    --    --    --   2 54*   --    TROPONINI  0 96*   < >  0 77*   --    --    --    --    AST  473*   --    --   388*   --    --    --    ALT  429*   --    --   413*   --    --    --    ALKPHOS  216*   --    --   238*   --    --    --    BILITOT  1 80*   --    --   1 60*   --    --    --     < > = values in this interval not displayed         Scheduled Meds:  Current Facility-Administered Medications:  acetaminophen 650 mg Oral Q6H PRN Yong Mckeon MD    albuterol 2 puff Inhalation Q4H PRN Yong Mckeon MD    b complex-vitamin C-folic acid 1 capsule Oral Daily With [facility], BRENDA    diazepam 5 mg Oral Q6H PRN Lakeshia Maya PA-C    furosemide 20 mg Oral BID (diuretic) BETTY Pozo    haloperidol lactate 2 mg Intravenous Once BETTY    hydrALAZINE 25 mg Oral TID Xiomara Handley PA-C    iron sucrose 300 mg Intravenous Daily Xiomara Handley PA-C Last Rate: 300 mg (08/20/18 1316)   ketorolac 1 drop Left Eye 4x Daily Yong Mckeon MD    menthol-methyl salicylate  Apply externally 4x Daily PRN Vitaly Beckwith PA-C    ofloxacin 1 drop Left Eye 4x Daily Yong Mckeon MD    prednisoLONE acetate 1 drop Left Eye 4x Daily Yong Mckeon MD    vancomycin 12 5 mg/kg Intravenous Q24H BETTY Pozo      Continuous Infusions:   PRN Meds:   acetaminophen    albuterol    diazepam    menthol-methyl salicylate    Surgical procedures (if appropriate):

## 2018-08-21 NOTE — ASSESSMENT & PLAN NOTE
· Not on Coumadin, but is on Eliquis usually to DVT appx 4 which has been on hold  Likely 2/2 congestive hepatopathy given transaminitis as well  LFT's have trended down  · Abdominal US showing ascites, no liver abnormalities or biliary obstruction  · INR 2 54 yesterday, will repeat tomorrow

## 2018-08-21 NOTE — ASSESSMENT & PLAN NOTE
· Patient has only one kidney  Nephrology is following  Unsure of baseline as patient is from [place]  · Creatinine slightly improved today, 2 62  She has been stable in house with creatinine 2 6-2 8, possibly her baseline? · Appreciate additional nephrology recommendations  · Nephrocaps started by nephrology  · Continue to monitor PVR Qshift and follow retention protocol  · Daily BMP  · No nephrotoxins     · Will need outpatient follow up

## 2018-08-21 NOTE — ASSESSMENT & PLAN NOTE
· Patient has large bruise/hematology  · CT brain - negative  · Unclear why she fell, ? Weakness 2/2 UTI, worsening renal function, anemia  Likely multifactorial   · PT/OT recommending inpatient rehab  Needs PT/OT evaluation

## 2018-08-21 NOTE — PLAN OF CARE
Problem: DISCHARGE PLANNING - CARE MANAGEMENT  Goal: Discharge to post-acute care or home with appropriate resources  INTERVENTIONS:  - Conduct assessment to determine patient/family and health care team treatment goals, and need for post-acute services based on payer coverage, community resources, and patient preferences, and barriers to discharge  - Address psychosocial, clinical, and financial barriers to discharge as identified in assessment in conjunction with the patient/family and health care team  - Arrange appropriate level of post-acute services according to patients   needs and preference and payer coverage in collaboration with the physician and health care team  - Communicate with and update the patient/family, physician, and health care team regarding progress on the discharge plan  - Arrange appropriate transportation to post-acute venues  Outcome: Progressing  LOS 3 DAYS  PATIENT IS A BUNDLE  PATIENT IS NOT A READMISSION  CM met with patient's daughter at bedside, as patient asleep at time of open assessment  Patient recently moved up from [place], where she had been residing with another child  On her commute from [facility] to [place], patient came straight to the hospital with her daughter, Cecilia Francois  Once able to return home, patient will reside with Cecilia Francois in a 2 story home in [place]  Cecilia Francois has arranged a room on the 1st floor for patient and already has an abundance of DME at home including: rollator, walker, wheelchair, shower chair and bedside commode  Patient was independent with all ADL until about June 15th, at which point patient was hospitalized in [facility] at [facility] and ultimately sent to [facility]  Patient was going to be set up with 3 hours daily home care in [place] but these services never started as Cecilia Francois wanted her mother in [place] with her  Cecilia Francois has already talked to [facility] and is in the process of setting up home care services   Patient will be using [facility] on [facility] and patient's daughter will potentially be utilizing a pharmacy provider through [place] who will deliver patient's medications via bubble packs  Patient has Rx plan and is able to afford all copays  Patient will be established with [address] [place], [place] [address]leslie PCP office at [address] in [place] ([phone])  Patient will be seeing opthalmologist Dr Jerry Ortiz at [address] [place] [address] Avenue ([phone]) on Sept 4th  Patient will be seeing a [facility] cardiologist on 9/13  CM also provided patient's daughter with Anam[facility] Massa card for any other specialist needs  CM discussed PT/OT evaluations and recommendation for rehab  Vianey Overall is in agreement and asked for referrals to be sent to 1st choice: [place] and 2nd choice: [facility]  Patient has history of anxiety but is not currently on any medication and does not have substance use history  Patient does not have POA but she does have AD  Patient's family normally transports patient when she is at baseline but patient will likely require BLS at discharge  CM will follow up on SNF referrals, will continue to assess for needs and will follow through discharge  CM reviewed the bundle program with patient and the following: "As part of your Medicare benefit, you are automatically enrolled in the bundle payment program  (Here is a notice from Medicare that you may keep and read)   The program is designed to assist in coordinating your care after you leave the hospital  A nurse from [facility] will be following you for 90 days  Please anticipate a phone call from the nurse within 48hrs of your discharge    As your Care Manager, I will be providing a handoff to your next level of care to ensure a safe transition "    CM reviewed discharge planning process including the following: identifying caregivers at home, preference for d/c planning needs, availability of treatment team to discuss questions or concerns patient and/or family may have regarding diagnosis, plan of care, old or new medications and discharge planning   CM will continue to follow for care coordination and update assessment as necessary

## 2018-08-21 NOTE — ASSESSMENT & PLAN NOTE
· 0 96/1 12/1 04/ 0 77  · No chest pain  · Echo as above     · Cardiology following, likely type 2 NSTEMI 2/2 acute CHF

## 2018-08-21 NOTE — SOCIAL WORK
LOS 3 DAYS  PATIENT IS A BUNDLE  PATIENT IS NOT A READMISSION  CM met with patient's daughter at bedside, as patient asleep at time of open assessment  Patient recently moved up from [place], where she had been residing with another child  On her commute from [place] to [place], patient came straight to the hospital with her daughter, Gunjan Lindsay  Once able to return home, patient will reside with Gunjan Lindsay in a 2 story home in [place]  Gunjan Lindsay has arranged a room on the 1st floor for patient and already has an abundance of DME at home including: rollator, walker, wheelchair, shower chair and bedside commode  Patient was independent with all ADL until about June 15th, at which point patient was hospitalized in [place] at [facility] and ultimately sent to [facility]  Patient was going to be set up with 3 hours daily home care in [place] but these services never started as Gunjan Lindsay wanted her mother in [place] with her  Gunjan Lindsay has already talked to [facility] and is in the process of setting up home care services  Patient will be using [facility] on [address] and patient's daughter will potentially be utilizing a pharmacy provider through [place] who will deliver patient's medications via bubble packs  Patient has Rx plan and is able to afford all copays  Patient will be established with Kelly Welch PCP office at [address] in [place] ([phone])  Patient will be seeing opthalmologist Dr Julio Cesar Sanchez at [address] [place] [address] ([phone]) on Sept 4th  Patient will be seeing a [facility] cardiologist on 9/13  CM also provided patient's daughter with [employer] card for any other specialist needs  CM discussed PT/OT evaluations and recommendation for rehab  Gunjan Lindsay is in agreement and asked for referrals to be sent to 1st choice: [place] and 2nd choice: [facility]  Patient has history of anxiety but is not currently on any medication and does not have substance use history   Patient does not have POA but she does have AD  Patient's family normally transports patient when she is at baseline but patient will likely require BLS at discharge  CM will follow up on SNF referrals, will continue to assess for needs and will follow through discharge  CM reviewed the bundle program with patient and the following: "As part of your Medicare benefit, you are automatically enrolled in the bundle payment program  (Here is a notice from Medicare that you may keep and read)   The program is designed to assist in coordinating your care after you leave the hospital  A nurse from [facility]  will be following you for 90 days  Please anticipate a phone call from the nurse within 48hrs of your discharge  As your Care Manager, I will be providing a handoff to your next level of care to ensure a safe transition "    CM reviewed discharge planning process including the following: identifying caregivers at home, preference for d/c planning needs, availability of treatment team to discuss questions or concerns patient and/or family may have regarding diagnosis, plan of care, old or new medications and discharge planning   CM will continue to follow for care coordination and update assessment as necessary

## 2018-08-21 NOTE — SPEECH THERAPY NOTE
Speech-Language Pathology Bedside Swallow Evaluation        Patient Name: Antoine Quintanilla    HFLXR'Q Date: 8/21/2018     Problem List  Patient Active Problem List   Diagnosis    History of recent fall    Supratherapeutic INR    SANTI (acute kidney injury) ([facility] [address] )    Elevated troponin    Hypoxia    Encephalopathy    Iron deficiency anemia    Low bicarbonate level    Elevated serum creatinine    Essential hypertension    UTI (urinary tract infection)    Acute on chronic combined systolic (congestive) and diastolic (congestive) heart failure (HCC)       Past Medical History  Past Medical History:   Diagnosis Date    Cardiac disease     Hypertension        Past Surgical History  Past Surgical History:   Procedure Laterality Date    CORONARY ANGIOPLASTY WITH STENT PLACEMENT         Summary    Pt presents with Moderate oropharyngeal dysphagia influenced by reduced CATA, lethargy  Pt may be at risk for aspiration  Recommendations:   Diet: puree/level 1 diet and thin liquids   Meds: crushed with puree   Aspiration precautions and compensatory swallowing strategies: upright posture, only feed when fully alert and slow rate of feeding  Other Recommendations/ considerations: will follow up for diet tolerance and potential to advance diet  Current Medical Status  Pt is a 70 y o  female who presented to [address] [place] [address] with encephalopathy; altered mental status and difficulty breathing  As per the daughter the patient was recently at a hospital in [place] where she was admitted for fall  The patient has visible bruising on the right side of her face to evidence this  She was discharged from this hospital on Thursday and was staying with her daughter in [place], up until her other daughter who lives here and [facility], who is currently here at bedside, drove to pick her up and bring her back to [facility]         Past medical history:   Please see H&P for details    Special Studies:  CT-head: 8/18: no acute pathology  CXR: 8/18 cardiomegaly; L pleural effusion    Social/Education/Vocational Hx:  Pt lives with family    Swallow Information   Current Risks for Dysphagia & Aspiration: AMS and decreased alertness  Current Symptoms/Concerns: coughing with po and pocketing food per nsg report  Current Diet: regular diet and thin liquids   Baseline Diet: regular diet and thin liquids    Baseline Assessment   Behavior/Cognition: lethargic, waxing and waning arousal level and decreased attention- pt needed constant stim to keep alert for taking po  Speech/Language Status: able to follow commands and limited verbal output  Patient Positioning: upright in bed     Swallow Mechanism Exam   Facial: symmetrical  Labial: WFL  Lingual: WFL  Velum: unable to visualize  Mandible: adequate ROM  Dentition: edentulous and has dentures, but does not always wear them to eat  Vocal quality:clear/adequate   Volitional Cough: weak   Respiratory: NC    Consistencies Assessed and Performance   Consistencies Administered: thin liquids, nectar thick, puree and hard solids    Oral Stage: poor effort at mastication due to lethargy, eventually able to chew and swallow  Pt was able to suck from straw, slightly reduced oral control w/ large sips of thin liquids  Pharyngeal Stage: swallows timely and complete  Throat clearing noted x1 w/ large sips  No s/s aspiration w/ single straw sips of thin liquids  Esophageal Concerns: none reported      Results Reviewed with: patient, RN, CRNP and family   Dysphagia Goals: pt will tolerate puree  with thin liquids without s/s of aspiration x3 days

## 2018-08-21 NOTE — ASSESSMENT & PLAN NOTE
· Improving, A&O x 3 with some short term deficits/forgetful at baseline per daughter  · ? 2/2 UTI  · Safety precautions, supportive care     · IV abx  · Additional plan as below

## 2018-08-21 NOTE — PLAN OF CARE
Problem: OCCUPATIONAL THERAPY ADULT  Goal: Performs self-care activities at highest level of function for planned discharge setting  See evaluation for individualized goals  Treatment Interventions: ADL retraining, Functional transfer training, UE strengthening/ROM, Endurance training, Patient/family training, Equipment evaluation/education, Continued evaluation, Activityengagement          See flowsheet documentation for full assessment, interventions and recommendations  Limitation: Decreased ADL status, Decreased UE ROM, Decreased UE strength, Decreased cognition, Decreased endurance, Decreased self-care trans (Significant lethargy)     Assessment: Pt is a(n) 78 yof admitted [facility] for Encephalopathy  HPI: Pt was experiencing b/l arm and leg swelling while on a trip from [place] along w/ difficulty breathing  Pt presents w/ the following PMH impacting occupational performance: Cardiac Disease and HTN  Pt dtr reports pt will be living in a [facility] w/ 4+5 MEL[place] and will have a main level setup  PTA pt uses RW or SPC for functional mobility  Pt reports I w/ dressing and functional mobility and A w I/ADLs at baseline  Upon evaluation, pt participated in limited conversation and able to report on work hx  Pt inaccurately reported where she lives as well as relationship to family memeber present  Pt also followed 1 step directions inconsistently  Pt complete supine to sit to EOB w/ dependent A x 1, to complete sit > supine required dependent A x 2  Both transfers required VC + time + leg mgmt + HOB elevation  Session was significantly limited 2* lethargy, weakness, and pain   Pt presents w/ decreased siting tolerance, static sitting balance, cognition, UE ROM, UE strength, FMC, endurance, activity tolerance, generalized weakness, and increased pain; impacting I w/ functional mobility/transfers, UB dressing, LB dressing, eating, toileting, bathing, community mobility, bed mobility, grooming, and activity engagement  While in acute care pt would benefit from OT services to address deficit areas  From an OT perspective, pt would benefit from post acute rehab versus home w/ family support pending progress in acute care when medically stable for discharge to return home w/ dtr   Will continue to follow     OT Discharge Recommendation:  (Post Acute Rehab)  OT - OK to Discharge:  (When medically stable)

## 2018-08-22 PROBLEM — I21.4 NSTEMI (NON-ST ELEVATED MYOCARDIAL INFARCTION) (HCC): Status: ACTIVE | Noted: 2018-01-01

## 2018-08-22 PROBLEM — E87.70 VOLUME OVERLOAD: Status: ACTIVE | Noted: 2018-01-01

## 2018-08-22 PROBLEM — R79.89 ABNORMAL LFTS: Status: ACTIVE | Noted: 2018-01-01

## 2018-08-22 PROBLEM — K59.00 CONSTIPATION: Status: ACTIVE | Noted: 2018-01-01

## 2018-08-22 PROBLEM — I48.91 ATRIAL FIBRILLATION (HCC): Chronic | Status: ACTIVE | Noted: 2018-01-01

## 2018-08-22 NOTE — PLAN OF CARE
Problem: PHYSICAL THERAPY ADULT  Goal: Performs mobility at highest level of function for planned discharge setting  See evaluation for individualized goals  Treatment/Interventions: LE strengthening/ROM, Therapeutic exercise, Endurance training, Cognitive reorientation, Patient/family training, Equipment eval/education, Bed mobility, Continued evaluation, Spoke to nursing  Equipment Recommended: Other (Comment) (TBD---will need mechanical conveyance for OOB @present)       See flowsheet documentation for full assessment, interventions and recommendations  Outcome: Progressing  Prognosis: Fair  Problem List: Decreased strength, Decreased range of motion, Decreased endurance, Impaired balance, Decreased mobility, Decreased coordination, Decreased cognition, Impaired vision, Obesity, Decreased skin integrity, Pain  Assessment: Patient fatigued limiting participation in therapy session  Daughter present throughout session aiding in patient participation  Pt participated in supine B LE exercise program with AAROM  Bed mobility rolling to left and right with max a x 1 and use of bedrail  Dependent x 2 for repositioning HOB  Continue to focus on LE strengthening as well as bed mobility as appropriate  Barriers to Discharge: Decreased caregiver support, Inaccessible home environment     Recommendation: Post acute IP rehab          See flowsheet documentation for full assessment

## 2018-08-22 NOTE — PROGRESS NOTES
Cardiology Progress Note - Trice Luo 70 y o  female MRN: [id number]    Unit/Bed#: -01 Encounter: [id number]      Assessment:  1  Acute on chronic combined CHF  2  CAD s/p CABG - details and anatomy unknown  3  Change in mental status  4  Elevated creatinine - unclear baseline   5  Hypertension   6  Dyslipidemia   7  Moderate aortic stenosis, mild to moderate aortic regurgitation  8  Pulmonary hypertension - likely group 2/3  9  Type 2 NSTEMI   10  NSVT    Plan:  1   Echo noted - no old for comparison - daughter unsure of name of providers in [place]   2  Volume status appears stable - s/p few doses of IV Lasix  3  BMP with stable renal function - oral Lasix resumed, weight down   4  Given CKD and underlying co-morbidities - no further invasive cardiac work-up; discussed with daughter who is in agreement  5  No further cardiac work[facility]up [facility] September, please call with questions    Subjective:   Patient seen and examined  No significant events overnight  Objective:     Vitals: Blood pressure 150/65, pulse 62, temperature 97 7 °F (36 5 °C), temperature source Oral, resp  rate 18, height 5' 4" (1 626 m), weight 70 8 kg (156 lb 1 4 oz), SpO2 97 %  , Body mass index is 26 79 kg/m² ,   Orthostatic Blood Pressures      Most Recent Value   Blood Pressure  150/65 filed at 08/22/2018 0754   Patient Position - Orthostatic VS  Lying filed at 08/22/2018 0754            Intake/Output Summary (Last 24 hours) at 08/22/18 1024  Last data filed at 08/22/18 0500   Gross per 24 hour   Intake              120 ml   Output              800 ml   Net             -680 ml           Physical Exam:    GEN: Trice Luo appears chronically ill, lethargic but arousable  HEENT: pupils equal, round, and reactive to light; extraocular muscles intact  NECK: supple, no carotid bruits, no obvious elevated JVP  HEART: regular rhythm, normal S1 and S2, 2/6 mid peaking SEJAL RUSB LUNGS: decreased breath sounds bilaterally   ABDOMEN: normal bowel sounds, soft, no tenderness, no distention  EXTREMITIES: peripheral pulses normal; no clubbing, cyanosis, + edema  NEURO: no focal findings   SKIN: normal without suspicious lesions on exposed skin    Medications:      Current Facility-Administered Medications:     acetaminophen (TYLENOL) tablet 650 mg, 650 mg, Oral, Q6H PRN, Shun Perez MD, 650 mg at 08/21/18 1421    albuterol (PROVENTIL HFA,VENTOLIN HFA) inhaler 2 puff, 2 puff, Inhalation, Q4H PRN, Shun Perez MD    ampicillin (OMNIPEN) 2,000 mg in sodium chloride 0 9 % 100 mL IVPB, 2,000 mg, Intravenous, Q6H, BETTY Pozo, Last Rate: 200 mL/hr at 08/22/18 0915, 2,000 mg at 08/22/18 0915    b complex-vitamin C-folic acid (NEPHROCAPS) capsule 1 capsule, 1 capsule, Oral, Daily With Dinner, Brigitte Larson PA-C, 1 capsule at 08/21/18 1801    bisacodyl (DULCOLAX) rectal suppository 10 mg, 10 mg, Rectal, Daily PRN, Jaylyn Norwood PA-C    diazepam (VALIUM) tablet 5 mg, 5 mg, Oral, Q6H PRN, Rebekah Buchanan PA-C, 5 mg at 08/21/18 2237    furosemide (LASIX) tablet 20 mg, 20 mg, Oral, BID (diuretic), BETTY Pozo, 20 mg at 08/22/18 0746    hydrALAZINE (APRESOLINE) tablet 25 mg, 25 mg, Oral, TID, Xiomara Handley PA-C, 25 mg at 08/22/18 0912    ketorolac (ACULAR) 0 5 % ophthalmic solution 1 drop, 1 drop, Left Eye, 4x Daily, Shun Perez MD, 1 drop at 08/21/18 2253    menthol-methyl salicylate (BENGAY) 02-64 % cream, , Apply externally, 4x Daily PRN, Melba Ferrell PA-C, 1 application at 94/67/43 2023    ofloxacin (OCUFLOX) 0 3 % ophthalmic solution 1 drop, 1 drop, Left Eye, 4x Daily, Shun Perez MD, 1 drop at 08/21/18 2253    ondansetron (ZOFRAN) injection 4 mg, 4 mg, Intravenous, Q6H PRN, BETTY Pozo, 4 mg at 08/21/18 1800    prednisoLONE acetate (PRED FORTE) 1 % ophthalmic suspension 1 drop, 1 drop, Left Eye, 4x Daily, Shun Perez MD, 1 drop at 08/21/18 2253     Labs & Results:      Results from last 7 days  Lab Units 08/19/18  0345 08/19/18  0117 08/18/18  2200   TROPONIN I ng/mL 0 77* 1 04* 1 12*       Results from last 7 days  Lab Units 08/19/18  0345 08/18/18  1745 08/18/18  1705   WBC Thousand/uL 10 84* 9 66  --    HEMOGLOBIN g/dL 8 4* 8 1*  --    I STAT HEMOGLOBIN g/dl  --   --  10 2*   HEMATOCRIT % 29 8* 28 4* 30*   PLATELETS Thousands/uL 230 291  --            Results from last 7 days  Lab Units 08/21/18  0640 08/20/18  0604 08/19/18  0534  08/18/18  1745   SODIUM mmol/L 143 141 143  < > 142   POTASSIUM mmol/L 4 1 3 9 4 6  < > 5 3   CHLORIDE mmol/L 109* 109* 111*  < > 109*   CO2 mmol/L 22 20* 22  < > 22   BUN mg/dL 54* 57* 54*  < > 51*   CREATININE mg/dL 2 62* 2 81* 2 69*  < > 2 70*   CALCIUM mg/dL 8 0* 7 8* 8 2*  < > 8 3   TOTAL PROTEIN g/dL  --   --  5 8*  --  6 0*   BILIRUBIN TOTAL mg/dL  --   --  1 60*  --  1 80*   ALK PHOS U/L  --   --  238*  --  216*   ALT U/L  --   --  413*  --  429*   AST U/L  --   --  388*  --  473*   GLUCOSE RANDOM mg/dL 79 97 89  < > 155*   < > = values in this interval not displayed  Results from last 7 days  Lab Units 08/20/18  0628 08/18/18  1745   INR  2 54* 5 65*   PTT seconds  --  45*       Results from last 7 days  Lab Units 08/19/18  0345   MAGNESIUM mg/dL 2 4       Counseling / Coordination of Care  Total floor / unit time spent today 20 minutes  Greater than 50% of total time was spent with the patient and / or family counseling and / or coordination of care

## 2018-08-22 NOTE — CASE MANAGEMENT
Thank you,  [address]  Utilization Review Department  Phone: [phone]; Fax [phone]  ATTENTION: Please call with any questions or concerns to [phone]  and carefully follow the prompts so that you are directed to the right person  Send all requests for admission clinical reviews, approved or denied determinations and any other requests to fax [phone]  All voicemails are confidential    Continued Stay Review    Date: 08/22/2018    Vital Signs: /65 (BP Location: Right arm)   Pulse 62   Temp 97 7 °F (36 5 °C) (Oral)   Resp 18   Ht 5' 4" (1 626 m)   Wt 70 8 kg (156 lb 1 4 oz) Comment: patient unable to stand  SpO2 97%   BMI 26 79 kg/m²     Medications:   Scheduled Meds:   Current Facility-Administered Medications:  acetaminophen 650 mg Oral Q6H PRN    albuterol 2 puff Inhalation Q4H PRN    ampicillin 2,000 mg Intravenous Q6H Last Rate: 2,000 mg (08/22/18 1421)   b complex-vitamin C-folic acid 1 capsule Oral Daily With Dinner    bisacodyl 10 mg Rectal Daily PRN    diazepam 5 mg Oral Q6H PRN    furosemide 20 mg Oral BID (diuretic)    hydrALAZINE 25 mg Oral TID    ketorolac 1 drop Left Eye 4x Daily    menthol-methyl salicylate  Apply externally 4x Daily PRN    ofloxacin 1 drop Left Eye 4x Daily    ondansetron 4 mg Intravenous Q6H PRN    prednisoLONE acetate 1 drop Left Eye 4x Daily      Continuous Infusions:    PRN Meds:     Abnormal Labs/Diagnostic Results: 08/22/2018; Chloride 112     BUN 49     Creatinine 2 17     AST 64          Alkaline Phosphatase 215     Total Protein 5 5     Albumin 2 5     Total Bilirubin 1 50       RBC 3 22     Hemoglobin 7 8     Hematocrit 27 9             Age/Sex: 70 y o  female     Assessment/Plan: 08/21/2018: Nephrology Consult:Assessment:  1  Elevated creatinine level:  As of today August 21st, her creatinine is decreased to 2 6 from 2 8 on admission  Not sure what her baseline is    Likely some degree of chronic kidney disease as she has an atrophic right kidney is 6 4 cm left kidney is 10 cm  She has an essentially a solitary functioning left kidney  There may be some degree of ATN however again need old records from [place]      2  Low bicarbonate level: This has improved bicarbonate level is 22; continue to follow     3  Fluid overload: This is improved agree with changing to oral diuretics appreciate Cardiology input  Patient is on Lasix 20 mg twice daily  With regards to edema there is no evidence of any DVT on left upper extremity or lower extremity Dopplers  I relayed this information to the patient's daughter       4  Anemia:  Multifaceted in addition to anemia of chronic kidney disease also iron deficiency  Patient is being treated for urinary tract infection hold off on further intravenous iron right now  Patient started on Nephrocaps  08/22/2018 Cardiology consult:  Assessment:  1  Acute on chronic combined CHF  2  CAD s/p CABG - details and anatomy unknown  3  Change in mental status  4  Elevated creatinine - unclear baseline   5  Hypertension   6  Dyslipidemia   7  Moderate aortic stenosis, mild to moderate aortic regurgitation  8  Pulmonary hypertension - likely group 2/3  9  Type 2 NSTEMI   10  NSVT   Plan:  1   Echo noted - no old for comparison - daughter unsure of name of providers in [place]   2  Volume status appears stable - s/p few doses of IV Lasix  3  BMP with stable renal function - oral Lasix resumed, weight down   4  Given CKD and underlying co-morbidities - no further invasive cardiac work-up; discussed with daughter who is in agreement  5    No further cardiac work-up - [facility] has arranged follow-up in early September, please call with questions    Discharge Plan: TBD

## 2018-08-22 NOTE — PHYSICAL THERAPY NOTE
PHYSICAL THERAPY NOTE      Patient Name: Stephanie Alcaraz  [id number] Date: 8/22/2018 08/22/18 1834   Pain Assessment   Pain Assessment 0-10   Pain Rating: FLACC (Rest) - Face 0   Pain Rating: FLACC (Rest) - Legs 0   Pain Rating: FLACC (Rest) - Activity 0   Pain Rating: FLACC (Rest) - Cry 0   Pain Rating: FLACC (Activity) - Face 1   Pain Rating: FLACC (Activity) - Legs 1   Pain Rating: FLACC (Activity) - Activity 0   Pain Rating: FLACC (Activity) - Cry 0   Pain Rating: FLACC (Activity) - Consolability 1   Score: FLACC (Activity) 3   Restrictions/Precautions   Weight Bearing Precautions Per Order No   Other Precautions Bed Alarm; Chair Alarm;Cognitive; Fall Risk;Pain;Visual impairment   General   Family/Caregiver Present Yes  (daughter)   Subjective   Subjective Patient supine in bed and is agreeable to therapy session  Patient identifers obtained from name & ID bracelet, also verification through daughter  Bed Mobility   Rolling R 2  Maximal assistance   Additional items Assist x 1;Bedrails; Increased time required;Verbal cues;LE management   Rolling L 2  Maximal assistance   Additional items Assist x 1;Bedrails; Increased time required;Verbal cues;LE management   Additional Comments Dependent x 2 for repositioning HOB  Endurance Deficit   Endurance Deficit Yes   Endurance Deficit Description limited activity tolerance   Activity Tolerance   Activity Tolerance Patient limited by fatigue;Patient limited by pain   Nurse Made Aware Spoke to [facility] city, RN    Exercises   Heelslides Supine;10 reps;AAROM; Bilateral   Hip Abduction Supine;10 reps;AAROM; Bilateral   Hip Adduction Supine;10 reps;AAROM; Bilateral   Knee AROM Short Arc Quad Supine;10 reps;AROM; Bilateral   Ankle Pumps Supine;5 reps;AROM; Bilateral   Assessment   Prognosis Fair   Problem List Decreased strength;Decreased range of motion;Decreased endurance; Impaired balance;Decreased mobility; Decreased coordination;Decreased cognition; Impaired vision;Obesity; Decreased skin integrity;Pain   Assessment Patient fatigued limiting participation in therapy session  Daughter present throughout session aiding in patient participation  Pt participated in supine B LE exercise program with AAROM  Bed mobility rolling to left and right with max a x 1 and use of bedrail  Dependent x 2 for repositioning HOB  Continue to focus on LE strengthening as well as bed mobility as appropriate  Barriers to Discharge Decreased caregiver support; Inaccessible home environment   Goals   STG Expiration Date 08/30/18   Treatment Day 1   Plan   Treatment/Interventions LE strengthening/ROM; Therapeutic exercise; Endurance training;Cognitive reorientation;Patient/family training;Equipment eval/education; Bed mobility;Continued evaluation;Spoke to nursing   PT Frequency 5x/wk   Recommendation   Recommendation Post acute IP rehab   Equipment Recommended Other (Comment)  (TBD---will need mechanical conveyance for OOB @present)         Wilmon Factor, PTA

## 2018-08-23 PROBLEM — J96.01 ACUTE RESPIRATORY FAILURE WITH HYPOXIA (HCC): Status: ACTIVE | Noted: 2018-01-01

## 2018-08-23 PROBLEM — N17.9 ACUTE KIDNEY INJURY (HCC): Status: ACTIVE | Noted: 2018-01-01

## 2018-08-23 NOTE — ASSESSMENT & PLAN NOTE
· Noted to be in rapid AFib this evening  · According to daughter she has a history of AFib  · Eliquis restarted  · Begun on low-dose metoprolol  · Monitor on telemetry

## 2018-08-23 NOTE — PHYSICAL THERAPY NOTE
PHYSICAL THERAPY NOTE    Patient Name: Onesimo Ormond JADLN'A Date: 8/23/2018 08/23/18 1616   Pain Assessment   Pain Assessment 0-10   Pain Score No Pain   Pain Rating: FLACC (Rest) - Face 0   Pain Rating: FLACC (Rest) - Legs 0   Pain Rating: FLACC (Rest) - Activity 0   Pain Rating: FLACC (Rest) - Cry 0   Pain Rating: FLACC (Rest) - Consolability 0   Score: FLACC (Rest) 0   Pain Rating: FLACC (Activity) - Face 1   Pain Rating: FLACC (Activity) - Legs 0   Pain Rating: FLACC (Activity) - Activity 1   Pain Rating: FLACC (Activity) - Cry 0   Pain Rating: FLACC (Activity) - Consolability 1   Score: FLACC (Activity) 3   Restrictions/Precautions   Weight Bearing Precautions Per Order No   Other Precautions Bed Alarm; Chair Alarm;Cognitive; Fall Risk;Pain;Visual impairment   General   Family/Caregiver Present Yes   Subjective   Subjective Patient supine in bed and is agreeable to therapy session  Patient identifers obtained from name, ID bracelet & verification from daughter   Bed Mobility   Supine to Sit 2  Maximal assistance   Additional items Assist x 1;HOB elevated; Bedrails; Increased time required;Verbal cues;LE management   Sit to Supine 1  Dependent   Additional items Assist x 2;HOB elevated; Increased time required;Verbal cues;LE management   Additional Comments Dependent x 2 for repositioning HOB   Transfers   Additional Comments Tolerated sitting EOB x 9 minutes with max a x 1 progressing to min a x1   Balance   Static Sitting Poor -   Endurance Deficit   Endurance Deficit Yes   Endurance Deficit Description limited sitting tolerance & activity tolerance   Activity Tolerance   Activity Tolerance Patient limited by fatigue;Patient limited by pain   Nurse Made Aware Spoke to Rojelio Nichols RN   Assessment   Prognosis Fair   Problem List Decreased strength;Decreased range of motion;Decreased endurance; Impaired balance;Decreased mobility; Decreased coordination;Decreased cognition; Impaired vision;Obesity; Decreased skin integrity;Pain   Assessment Patient more alert and able to participate in todays session  Patient able to transfer supine to sit with max a x 1 and use of bedrail  Requires dependent assist x 2 to returnt o supien and reposition HOB  Patient tolerated sitting EOB x 9 minutes with inital max a x1 progressing to min a x 1 and use of bedrail to support with R UE  Patient presents with L retro-lateral lean requiring input and cuing to center sitting balance  Patient fatigued requiring return to supine  Continue to focus on bed mobility and sitting EOB tolerance as appropriate  Barriers to Discharge Inaccessible home environment;Decreased caregiver support   Goals   Patient Goals none stated   STG Expiration Date 08/30/18   Treatment Day 2   Plan   Treatment/Interventions LE strengthening/ROM; Therapeutic exercise; Endurance training;Cognitive reorientation;Patient/family training;Equipment eval/education; Bed mobility;Spoke to nursing   PT Frequency 5x/wk   Recommendation   Recommendation Post acute IP rehab   Equipment Recommended Other (Comment)  (TBD---will need mechanical conveyance for OOB @present)       Sherree Barthel, PTA

## 2018-08-23 NOTE — OCCUPATIONAL THERAPY NOTE
Attempted to see pt for OT tx however pt out of room at this time  RN reports pt was sent for a PICC line and estimates pt to be another 30-45mins  Will f/u as schedule permits  Thank you       Elder Hicks, OT

## 2018-08-23 NOTE — PROGRESS NOTES
Progress Note - Deidra Montejo 1940, 70 y o  female MRN: [id number]    Unit/Bed#: -01 Encounter: [id number]    Primary Care Provider: No primary care provider on file  Date and time admitted to hospital: 8/18/2018  4:17 PM        UTI (urinary tract infection)   Assessment & Plan    · Enterococcus faecalis  · Clinically improved  · Continue ampicillin          Elevated serum creatinine   Assessment & Plan    · Baseline unknown  · Records from [facility] awaited  · Creatinine improved from 2 8 on admission to 2 1 today with diuresis for volume overload  · Monitor        Volume overload   Assessment & Plan    · Improved  · Status post few doses of IV Lasix  · Currently on Lasix 20 mg twice daily        Atrial fibrillation (HCC)   Assessment & Plan    · Noted to be in rapid AFib this evening  · According to daughter she has a history of AFib  · Eliquis restarted  · Begun on low-dose metoprolol  · Monitor on telemetry        NSTEMI (non-ST elevated myocardial infarction) [facility])   Assessment & Plan    · No further cardiac workup recommended by Cardiology  · Due to volume overload on admission         * Encephalopathy   Assessment & Plan    · Continues to improve  · Toxic metabolic from urinary tract infection and volume overload         Abnormal LFTs   Assessment & Plan    · Improved  · Likely from passive hepatic congestion  · Monitor        Constipation   Assessment & Plan    · Bowel regimen            VTE Pharmacologic Prophylaxis:   Pharmacologic: Apixaban (Eliquis)  Mechanical VTE Prophylaxis in Place: Yes    Patient Centered Rounds: I have performed bedside rounds with nursing staff today  Education and Discussions with Family / Patient:  Discussed with daughter at the bedside    Time Spent for Care: 20 minutes  More than 50% of total time spent on counseling and coordination of care as described above      Current Length of Stay: 4 day(s)    Current Patient Status: Inpatient   Certification Statement: The patient will continue to require additional inpatient hospital stay due to Urinary tract infection with encephalopathy    Code Status: Level 1 - Full Code    Subjective:   More alert today  Responding appropriately to questions  Complained of constipation - had a small bowel movement after suppository  Telemetry monitoring showed rapid AFib this evening  Objective:     Vitals:   Temp (24hrs), Av 8 °F (36 6 °C), Min:97 7 °F (36 5 °C), Max:98 °F (36 7 °C)    HR:  [] 104  Resp:  [18-20] 18  BP: (114-169)/(58-70) 114/58  SpO2:  [97 %-98 %] 98 %  Body mass index is 26 79 kg/m²  Physical Exam:     Physical Exam   Constitutional: She is oriented to person, place, and time  HENT:   Head: Atraumatic  Eyes: EOM are normal    Neck: Neck supple  No JVD present  No tracheal deviation present  No thyromegaly present  Cardiovascular: An irregularly irregular rhythm present  Pulmonary/Chest: Effort normal and breath sounds normal  No respiratory distress  She has no wheezes  She has no rales  Abdominal: Soft  Bowel sounds are normal  She exhibits no distension  There is no tenderness  There is no rebound  Musculoskeletal:   Trace to 1+ extremity edema   Neurological: She is alert and oriented to person, place, and time  Skin: Skin is warm and dry  Psychiatric: She has a normal mood and affect         Additional Data:     Labs:      Results from last 7 days  Lab Units 18  1323   WBC Thousand/uL 9 21   HEMOGLOBIN g/dL 7 8*   HEMATOCRIT % 27 9*   PLATELETS Thousands/uL 149   NEUTROS PCT % 78*   LYMPHS PCT % 13*   MONOS PCT % 8   EOS PCT % 0       Results from last 7 days  Lab Units 18  1323   SODIUM mmol/L 144   POTASSIUM mmol/L 3 6   CHLORIDE mmol/L 112*   CO2 mmol/L 23   BUN mg/dL 49*   CREATININE mg/dL 2 17*   CALCIUM mg/dL 8 4   TOTAL PROTEIN g/dL 5 5*   BILIRUBIN TOTAL mg/dL 1 50*   ALK PHOS U/L 215*   ALT U/L 208*   AST U/L 64*   GLUCOSE RANDOM mg/dL 94       Results from last 7 days  Lab Units 08/20/18  0628   INR  2 54*       Results from last 7 days  Lab Units 08/22/18  2104 08/22/18  1614 08/22/18  1123 08/22/18  0757 08/21/18  2108 08/21/18  1540 08/18/18  1634   POC GLUCOSE mg/dl 162* 139 106 125 141* 132 114       Results from last 7 days  Lab Units 08/22/18  1323   HEMOGLOBIN A1C % 6 4*         * I Have Reviewed All Lab Data Listed Above  * Additional Pertinent Lab Tests Reviewed: [place] [facility] Admission Reviewed      Recent Cultures (last 7 days):       Results from last 7 days  Lab Units 08/20/18  1613 08/18/18  1811   BLOOD CULTURE  No Growth at 48 hrs  --    URINE CULTURE   --  >100,000 cfu/ml Enterococcus faecalis*       Last 24 Hours Medication List:     Current Facility-Administered Medications:  acetaminophen 650 mg Oral Q6H PRN Bulmaro Quiñones MD    albuterol 2 puff Inhalation Q4H PRN Bulmaro Quiñones MD    ampicillin 2,000 mg Intravenous Q6H BETTY Siegel Last Rate: 2,000 mg (08/22/18 2132)   apixaban 5 mg Oral BID Jolly Marrero MD    b complex-vitamin C-folic acid 1 capsule Oral Daily With [facility], BRENDA    bisacodyl 10 mg Rectal Daily PRN Jaylyn Norwood PA-C    diazepam 5 mg Oral Q6H PRN Gilmar Chahal PA-C    docusate sodium 100 mg Oral BID Jolly Marrero MD    furosemide 20 mg Oral BID (diuretic) BETTY Pozo    hydrALAZINE 25 mg Oral TID Ming Ruano PA-C    menthol-methyl salicylate  Apply externally 4x Daily PRN RED WillisC    metoprolol tartrate 12 5 mg Oral Q12H Ronald Tong MD    ondansetron 4 mg Intravenous Q6H PRN BETTY Siegel    polyethylene glycol 17 g Oral Daily Jolly Marrero MD    prednisoLONE acetate 1 drop Left Eye 4x Daily Bulmaro Quiñones MD         Today, Patient Was Seen By: Jolly Marrero MD    ** Please Note: Dictation voice to text software may have been used in the creation of this document   **

## 2018-08-23 NOTE — ASSESSMENT & PLAN NOTE
· Baseline unknown  · Records from [facility] awaited  · Creatinine improved from 2 8 on admission to 2 1 today with diuresis for volume overload  · Monitor

## 2018-08-23 NOTE — PROGRESS NOTES
Tap water enema performed as order by Dr Smita Carmen  Pt tolerated  Small brown stool noted  Pt repositioned and will continue to monitor

## 2018-08-23 NOTE — PROCEDURES
PICC Line Insertion  Date/Time: 8/23/2018 3:19 PM  Performed by: Adela Jain by: Tabitha Dhillon     Patient location:  IR  Consent:     Consent obtained:  Written  Universal protocol:     Procedure explained and questions answered to patient or proxy's satisfaction: yes      Relevant documents present and verified: yes      Test results available and properly labeled: yes      Radiology Images displayed and confirmed  If images not available, report reviewed: yes      Required blood products, implants, devices, and special equipment available: yes      Site/side marked: yes      Immediately prior to procedure, a time out was called: yes      Patient identity confirmed:  Verbally with patient, arm band and hospital-assigned identification number  Pre-procedure details:     Hand hygiene: Hand hygiene performed prior to insertion      Sterile barrier technique: All elements of maximal sterile technique followed      Skin preparation:  ChloraPrep    Skin preparation agent: Skin preparation agent completely dried prior to procedure    Indications:     PICC line indications: long term antibiotics and other (comment)    Anesthesia (see MAR for exact dosages):      Anesthesia method:  Local infiltration    Local anesthetic:  Lidocaine 1% w/o epi  Procedure details:     Location:  Basilic    Vessel type: vein      Laterality:  Right    Approach: percutaneous technique used      Patient position:  Flat    Procedural supplies:  Double lumen    Catheter size:  5 Fr    Landmarks identified: yes      Ultrasound guidance: yes      Sterile ultrasound techniques: Sterile gel and sterile probe covers were used      Number of attempts:  1    Successful placement: yes      Total catheter length (cm):  37    Catheter out on skin (cm):  0    Max flow rate:  999 mls/hr    Arm circumference:  25 cm  Post-procedure details:     Post-procedure:  Securement device placed    Assessment:  Blood return through all ports, placement verified by x-ray and free fluid flow    Post-procedure complications: none      Patient tolerance of procedure:   Tolerated well, no immediate complications

## 2018-08-23 NOTE — PROGRESS NOTES
[facility]   Johana Connor 70 y o  female MRN: [id number]  Unit/Bed#: -01 Encounter: [id number]  Reason for Consult: SANTI    ASSESSMENT and PLAN:    70-year-old female with a past medical history of  hypertension, diabetes,CAD, CHF, solitary  functioning kidney, DVT, presenting on 08/18 with worsening shortness of breath  And change in mental state  nephrology is consulted for elevated creatinine   patient was recently admitted in [place] after a fall   Patient was noted to have a creatinine of 2 7 on admission and therefore nephrology team was consulted  1) SANTI ? vs CKD? -creatinine has been stable 2 7-2 8 and this may be the baseline; then decreased to 2 2 yesterday and slightly higher on 8/23  Per Primary Team discussion, patient had Cr 1 5 in July and then on discharge recently on 8/16, Cr was 1 79 mg/dL  - UA -small blood, 10-20 RBC, trace protein, moderate leukocyte, 0 bacteria  - UPCR -  0 8  - renal us -  small atrophic right kidney which is 6 4 cm dn L 10 cm  - I/O; avoid nephrotoxic agents  - on Nephrocaps  -creatinine slightly higher on 08/23  This may be in the setting of AFib and hypotension  -hold hydralazine  -continue Lasix for now  -check chest x-ray in the morning  -this may be the new baseline for now  -continue treatment for urinary tract infection  -follow-up final records  - reviewed with Primary Team Attending     2) Vol overload     -  Received intravenous diuretics initially  -Echocardiogram- EF 40%, grade III dCHF  - moderate TR, PASP 50 mmHg     3) HTN-marginal blood pressures  Hold hydralazine  4) change in mental state     -Urine with Enterococcus  -antibiotics per the primary team     5) Anemia     - Iron panel with iron deficiency anemia   - IV iron held due to current UTI     6) transaminitis     - as per Primary Team  -improving  Possible congestive hepatopathy?       7) acid/base    - bicarb stable      SUBJECTIVE / INTERVAL HISTORY:  Patient seen post midline insertion  Appears weak  States that is feeling better than yesterday  Urine output not strictly recorded  Blood pressure is marginal is morning      OBJECTIVE:  Current Weight: Weight - Scale: 71 8 kg (158 lb 4 6 oz)  Vitals:    08/22/18 1657 08/22/18 2214 08/23/18 0522 08/23/18 0700   BP: 137/69 114/58  113/60   BP Location: Right arm Right arm  Right arm   Pulse:  104  (!) 107   Resp:  18  16   Temp:  98 °F (36 7 °C)  97 5 °F (36 4 °C)   TempSrc:  Oral  Oral   SpO2:  98%  99%   Weight:   71 8 kg (158 lb 4 6 oz)    Height:           Intake/Output Summary (Last 24 hours) at 08/23/18 1447  Last data filed at 08/23/18 1101   Gross per 24 hour   Intake              300 ml   Output              500 ml   Net             -200 ml     General: NAD, weak  Skin: no rash  Eyes: slight icteric sclera  ENT: dry mucous membrane  Neck: supple  Chest: diminished air intake b/l  CVS: s1s2  Abdomen: soft, nontender  Extremities: no sig pitting edema  : no holt  Neuro: AAOX2-3  Psych: normal affect    Medications:    Current Facility-Administered Medications:     acetaminophen (TYLENOL) tablet 650 mg, 650 mg, Oral, Q6H PRN, Bulmaro Quiñones MD, 650 mg at 08/22/18 1749    albuterol (PROVENTIL HFA,VENTOLIN HFA) inhaler 2 puff, 2 puff, Inhalation, Q4H PRN, Bulmaro Quiñones MD    ampicillin (OMNIPEN) 2,000 mg in sodium chloride 0 9 % 100 mL IVPB, 2,000 mg, Intravenous, Q6H, BETTY Pozo, Last Rate: 200 mL/hr at 08/23/18 1008, 2,000 mg at 08/23/18 1008    apixaban (ELIQUIS) tablet 5 mg, 5 mg, Oral, BID, Jolly Marrero MD, 5 mg at 08/23/18 1005    b complex-vitamin C-folic acid (NEPHROCAPS) capsule 1 capsule, 1 capsule, Oral, Daily With [facility], 1 capsule at 08/22/18 1750    bisacodyl (DULCOLAX) rectal suppository 10 mg, 10 mg, Rectal, Daily PRN, Jaylyn Norwood PA-C, 10 mg at 08/22/18 1421    docusate sodium (COLACE) capsule 100 mg, 100 mg, Oral, BID, Osmin Paz MD, 100 mg at 08/23/18 1004    furosemide (LASIX) tablet 20 mg, 20 mg, Oral, BID (diuretic), BETTY Pozo, 20 mg at 08/22/18 1750    hydrALAZINE (APRESOLINE) tablet 25 mg, 25 mg, Oral, TID, Xiomara Handley PA-C, Stopped at 08/23/18 1005    menthol-methyl salicylate (BENGAY) 59-78 % cream, , Apply externally, 4x Daily PRN, Geoff Brantley PA-C, 1 application at 10/73/30 2023    metoprolol tartrate (LOPRESSOR) partial tablet 12 5 mg, 12 5 mg, Oral, Q12H [address], Osmin Paz MD, 12 5 mg at 08/23/18 1005    ondansetron (ZOFRAN) injection 4 mg, 4 mg, Intravenous, Q6H PRN, BETTY Pozo, 4 mg at 08/21/18 1800    polyethylene glycol (MIRALAX) packet 17 g, 17 g, Oral, Daily, Osmin Paz MD, 17 g at 08/23/18 1004    prednisoLONE acetate (PRED FORTE) 1 % ophthalmic suspension 1 drop, 1 drop, Left Eye, 4x Daily, Phoebe Cole MD, 1 drop at 08/23/18 1313    Laboratory Results:    Results from last 7 days  Lab Units 08/23/18  1313 08/22/18  1323 08/21/18  0640 08/20/18  0604 08/19/18  0534 08/19/18  0345 08/18/18  2154 08/18/18  1745 08/18/18  1705 08/18/18  1704   WBC Thousand/uL 10 39* 9 21  --   --   --  10 84*  --  9 66  --   --    HEMOGLOBIN g/dL 8 2* 7 8*  --   --   --  8 4*  --  8 1*  --   --    I STAT HEMOGLOBIN g/dl  --   --   --   --   --   --   --   --  10 2* 9 2*   HEMATOCRIT % 28 6* 27 9*  --   --   --  29 8*  --  28 4* 30* 27*   PLATELETS Thousands/uL 154 149  --   --   --  230  --  291  --   --    SODIUM mmol/L 145 144 143 141 143  --  143 142  --   --    POTASSIUM mmol/L 3 8 3 6 4 1 3 9 4 6  --  4 5 5 3  --   --    CHLORIDE mmol/L 110* 112* 109* 109* 111*  --  110* 109*  --   --    CO2 mmol/L 25 23 22 20* 22  --  22 22  --   --    BUN mg/dL 48* 49* 54* 57* 54*  --  52* 51*  --   --    CREATININE mg/dL 2 34* 2 17* 2 62* 2 81* 2 69*  --  2 77* 2 70*  --   --    CALCIUM mg/dL 8 3 8 4 8 0* 7 8* 8 2*  --  8 6 8 3  --   --    MAGNESIUM mg/dL 2 2  --   --   --   --  2 4 --   --   --   --    PHOSPHORUS mg/dL  --   --   --   --   --  4 8*  --   --   --   --    TOTAL PROTEIN g/dL 5 7* 5 5*  --   --  5 8*  --   --  6 0*  --   --    GLUCOSE RANDOM mg/dL 139 94 79 97 89  --  129 155*  --   --    GLUCOSE, ISTAT mg/dl  --   --   --   --   --   --   --   --  122 146*

## 2018-08-24 PROBLEM — I25.10 CAD (CORONARY ARTERY DISEASE): Status: ACTIVE | Noted: 2018-01-01

## 2018-08-24 PROBLEM — R13.10 DYSPHAGIA: Status: ACTIVE | Noted: 2018-01-01

## 2018-08-24 NOTE — ASSESSMENT & PLAN NOTE
· Daughter states that she had likely been recommended O2 on recent discharge  · Hypoxia likely from volume overload  · Continue O2 to maintain sats above 88%  · Await records from [place]  · Chest x-ray to be repeated in the morning

## 2018-08-24 NOTE — PROGRESS NOTES
Progress Note - Johnathon Sosa 1940, 70 y o  female MRN: [id number]    Unit/Bed#: -01 Encounter: [id number]    Primary Care Provider: No primary care provider on file  Date and time admitted to hospital: 8/18/2018  4:17 PM        UTI (urinary tract infection)   Assessment & Plan    · Enterococcus faecalis  · Clinically improved  · Continue ampicillin          Acute kidney injury [facility])   Assessment & Plan    · Per discussion with primary care physician's office creatinine was 1 5 on July 12, 2018  · Following recent discharge on August 16, 2018 when she had SANTI on CKD 3 her creatinine was 1 79 on 08/16/2018   · Discussed with Dr Tia Meraz -continue current dose of Lasix  Renal function improved with diuresis  · Await faxed records from her primary care physician's office        Volume overload   Assessment & Plan    · Improved  · Status post few doses of IV Lasix  · Currently on Lasix 20 mg twice daily        Atrial fibrillation ([address] )   Assessment & Plan    · Noted to be in rapid AFib in the evening of 08/22/2018  · According to daughter she has a history of AFib  · Rate controlled  · Continue Eliquis, low-dose metoprolol        NSTEMI (non-ST elevated myocardial infarction) [facility])   Assessment & Plan    · No further cardiac workup recommended by Cardiology  · Due to volume overload on admission         * Encephalopathy   Assessment & Plan    · Gradual improvement  · Toxic metabolic from urinary tract infection, acute kidney injury            Abnormal LFTs   Assessment & Plan    · Likely from passive congestion  · GI eval  · Hepatitis panel         Acute respiratory failure with hypoxia ([address] )   Assessment & Plan    · Daughter states that she had likely been recommended O2 on recent discharge  · Hypoxia likely from volume overload  · Continue O2 to maintain sats above 88%  · Await records from [place]  · Chest x-ray to be repeated in the morning            VTE Pharmacologic Prophylaxis: Pharmacologic: Apixaban (Eliquis)  Mechanical VTE Prophylaxis in Place: Yes    Patient Centered Rounds: I have performed bedside rounds with nursing staff today  Discussions with Specialists or Other Care Team Provider:  Discussed with Dr Jaylan Nj    Education and Discussions with Family / Patient:  Extensive discussion with daughter and her  at the bedside     Time Spent for Care: 20 minutes  More than 50% of total time spent on counseling and coordination of care as described above  Current Length of Stay: 5 day(s)    Current Patient Status: Inpatient   Certification Statement: The patient will continue to require additional inpatient hospital stay due to UTI, SANTI, volume overload    Code Status: Level 1 - Full Code    Subjective:   Complains of tiredness  Daughter concerned with persistent generalized weakness  No fever  No nausea, vomiting or diarrhea    Objective:     Vitals:   Temp (24hrs), Av 8 °F (36 6 °C), Min:97 5 °F (36 4 °C), Max:98 °F (36 7 °C)    HR:  [104-117] 117  Resp:  [16-18] 18  BP: (113-144)/(58-66) 127/66  SpO2:  [98 %-99 %] 99 %  Body mass index is 27 17 kg/m²  Physical Exam:     Physical Exam   Constitutional: She is oriented to person, place, and time  HENT:   Head: Atraumatic  Eyes: EOM are normal    Neck: Neck supple  No JVD present  No tracheal deviation present  No thyromegaly present  Cardiovascular: An irregularly irregular rhythm present  Pulmonary/Chest: Effort normal and breath sounds normal  No respiratory distress  She has no wheezes  She has no rales  Abdominal: Soft  Bowel sounds are normal  She exhibits no distension  There is no tenderness  There is no rebound  Musculoskeletal:   Trace left upper extremity edema with weeping   Neurological: She is alert and oriented to person, place, and time  Skin: Skin is warm and dry  Psychiatric: She has a normal mood and affect         Additional Data:     Labs:      Results from last 7 days  Lab Units 08/23/18  1313 08/22/18  1323   WBC Thousand/uL 10 39* 9 21   HEMOGLOBIN g/dL 8 2* 7 8*   HEMATOCRIT % 28 6* 27 9*   PLATELETS Thousands/uL 154 149   NEUTROS PCT %  --  78*   LYMPHS PCT %  --  13*   MONOS PCT %  --  8   EOS PCT %  --  0       Results from last 7 days  Lab Units 08/23/18  1313   SODIUM mmol/L 145   POTASSIUM mmol/L 3 8   CHLORIDE mmol/L 110*   CO2 mmol/L 25   BUN mg/dL 48*   CREATININE mg/dL 2 34*   CALCIUM mg/dL 8 3   TOTAL PROTEIN g/dL 5 7*   BILIRUBIN TOTAL mg/dL 1 60*   ALK PHOS U/L 223*   ALT U/L 167*   AST U/L 40   GLUCOSE RANDOM mg/dL 139       Results from last 7 days  Lab Units 08/20/18  0628   INR  2 54*       Results from last 7 days  Lab Units 08/23/18  2123 08/23/18  1725 08/23/18  1148 08/23/18  0807 08/22/18  2104 08/22/18  1614 08/22/18  1123 08/22/18  0757 08/21/18  2108 08/21/18  1540 08/18/18  1634   POC GLUCOSE mg/dl 248* 187* 129 119 162* 139 106 125 141* 132 114       Results from last 7 days  Lab Units 08/22/18  1323   HEMOGLOBIN A1C % 6 4*         * I Have Reviewed All Lab Data Listed Above  * Additional Pertinent Lab Tests Reviewed:  [address] Admission Reviewed      Last 24 Hours Medication List:     Current Facility-Administered Medications:  acetaminophen 650 mg Oral Q6H PRN Luis Enrique Simpson MD    albuterol 2 puff Inhalation Q4H PRN Luis Enrique Simpson MD    ampicillin 2,000 mg Intravenous Q6H BETTY Shaffer Last Rate: 2,000 mg (08/23/18 2118)   apixaban 5 mg Oral BID Oz Lino MD    b complex-vitamin C-folic acid 1 capsule Oral Daily With [facility], BRENDA    bisacodyl 10 mg Rectal Daily PRN Jaylyn Norwood, BRENDA    docusate sodium 100 mg Oral BID Oz Lino MD    furosemide 20 mg Oral BID (diuretic) BETTY Shaffer    [START ON 8/24/2018] insulin lispro 1-5 Units Subcutaneous TID AC Vitaly Thrasher PA-C    insulin lispro 1-5 Units Subcutaneous HS Vitaly Figueroa PA-C    menthol-methyl salicylate  Apply externally 4x Daily PRN Vitaly Biggs PA-C    metoprolol tartrate 12 5 mg Oral Q12H [facility] Jolly Marrero MD    ondansetron 4 mg Intravenous Q6H PRN BETTY Siegel    polyethylene glycol 17 g Oral Daily Jolly Marrero MD    prednisoLONE acetate 1 drop Left Eye 4x Daily Bulmaro Quiñones MD         Today, Patient Was Seen By: Jolly Marrero MD    ** Please Note: Dictation voice to text software may have been used in the creation of this document   **

## 2018-08-24 NOTE — ASSESSMENT & PLAN NOTE
· Per discussion with primary care physician's office creatinine was 1 5 on July 12, 2018  · Following recent discharge on August 16, 2018 when she had SANTI on CKD 3 her creatinine was 1 79 on 08/16/2018   · Discussed with Dr Delicia Hurst -continue current dose of Lasix    Renal function improved with diuresis  · Await faxed records from her primary care physician's office

## 2018-08-24 NOTE — PLAN OF CARE
Problem: Potential for Falls  Goal: Patient will remain free of falls  INTERVENTIONS:  - Assess patient frequently for physical needs  -  Identify cognitive and physical deficits and behaviors that affect risk of falls    -  [place] fall precautions as indicated by assessment   - Educate patient/family on patient safety including physical limitations  - Instruct patient to call for assistance with activity based on assessment  - Modify environment to reduce risk of injury  - Consider OT/PT consult to assist with strengthening/mobility   Outcome: Progressing      Problem: Prexisting or High Potential for Compromised Skin Integrity  Goal: Skin integrity is maintained or improved  INTERVENTIONS:  - Identify patients at risk for skin breakdown  - Assess and monitor skin integrity  - Assess and monitor nutrition and hydration status  - Monitor labs (i e  albumin)  - Assess for incontinence   - Turn and reposition patient  - Assist with mobility/ambulation  - Relieve pressure over bony prominences  - Avoid friction and shearing  - Provide appropriate hygiene as needed including keeping skin clean and dry  - Evaluate need for skin moisturizer/barrier cream  - Collaborate with interdisciplinary team (i e  Nutrition, Rehabilitation, etc )   - Patient/family teaching   Outcome: Progressing      Problem: DISCHARGE PLANNING - CARE MANAGEMENT  Goal: Discharge to post-acute care or home with appropriate resources  INTERVENTIONS:  - Conduct assessment to determine patient/family and health care team treatment goals, and need for post-acute services based on payer coverage, community resources, and patient preferences, and barriers to discharge  - Address psychosocial, clinical, and financial barriers to discharge as identified in assessment in conjunction with the patient/family and health care team  - Arrange appropriate level of post-acute services according to patients   needs and preference and payer coverage in collaboration with the physician and health care team  - Communicate with and update the patient/family, physician, and health care team regarding progress on the discharge plan  - Arrange appropriate transportation to post-acute venues   Outcome: Progressing      Problem: Nutrition/Hydration-ADULT  Goal: Nutrient/Hydration intake appropriate for improving, restoring or maintaining nutritional needs  Monitor and assess patient's nutrition/hydration status for malnutrition (ex- brittle hair, bruises, dry skin, pale skin and conjunctiva, muscle wasting, smooth red tongue, and disorientation)  Collaborate with interdisciplinary team and initiate plan and interventions as ordered  Monitor patient's weight and dietary intake as ordered or per policy  Utilize nutrition screening tool and intervene per policy  Determine patient's food preferences and provide high-protein, high-caloric foods as appropriate       INTERVENTIONS:  - Monitor oral intake, urinary output, labs, and treatment plans  - Assess nutrition and hydration status and recommend course of action  - Evaluate amount of meals eaten  - Assist patient with eating if necessary   - Allow adequate time for meals  - Recommend/ encourage appropriate diets, oral nutritional supplements, and vitamin/mineral supplements  - Order, calculate, and assess calorie counts as needed  - Recommend, monitor, and adjust tube feedings and TPN/PPN based on assessed needs  - Assess need for intravenous fluids  - Provide specific nutrition/hydration education as appropriate  - Include patient/family/caregiver in decisions related to nutrition   Outcome: Progressing

## 2018-08-24 NOTE — PROGRESS NOTES
NEPHROLOGY PROGRESS NOTE   Sangita Ho 70 y o  female MRN: [id number]  Unit/Bed#: -01 Encounter: [id number]  Reason for Consult: SANTI    ASSESSMENT/PLAN:  1  Acute Kidney Injury vs Chronic Kidney Disease- creatinine was stable around 2 7-2 8 but then improved to 2 17 on 8/22 and now worsening again to 2 8  - per primary team, creatinine was 1 5 in July and 1 8 on 8/16 discharge from [place]  - UA: small blood, 10-20 rbc, trace protein, moderate leuks, 0 bacteria  - UPC ratio is 0 8  - renal ultrasound: atrophic right kidney  - avoid a fib and hypotension  - holding antihypertensives  - check PVR again (previously 250ml)  2  Volume Overload- now off lasix as of today 8/24  3  Hypertension- BP acceptable with holding antihypertensives  4  Anemia, iron deficient- received two doses of IV iron  5  Transaminitis- GI on board  6  UTI- on ampicillin    SUBJECTIVE:  Patient states she has foot pain  She states she has no difficulty breathing currently  She is not eating great as she doesn't have an appetite      OBJECTIVE:  Current Weight: Weight - Scale: 71 6 kg (157 lb 13 6 oz)  Vitals:    08/23/18 2118 08/23/18 2210 08/24/18 0544 08/24/18 0700   BP: 127/66 127/65  137/58   BP Location:  Right arm  Left arm   Pulse: (!) 117 105  (!) 109   Resp:  18  20   Temp:  (!) 97 4 °F (36 3 °C)  97 5 °F (36 4 °C)   TempSrc:  Oral  Oral   SpO2:  99%  100%   Weight:   71 6 kg (157 lb 13 6 oz)    Height:           Intake/Output Summary (Last 24 hours) at 08/24/18 1127  Last data filed at 08/24/18 0427   Gross per 24 hour   Intake              237 ml   Output              600 ml   Net             -363 ml     General: NAD  Skin: no rash  HEENT: normocephalic  Neck: supple  Chest: CTAB  Heart: RRR  Abdomen: soft nt nd  Extremities: no edema  Neuro: alert awake  Psych: mood and affect appropriate    Medications:    Current Facility-Administered Medications:     acetaminophen (TYLENOL) tablet 650 mg, 650 mg, Oral, Q6H PRN, Thiago Every Michelle Hebert MD, 650 mg at 08/24/18 0832    albuterol (PROVENTIL HFA,VENTOLIN HFA) inhaler 2 puff, 2 puff, Inhalation, Q4H PRN, Bob Clayton MD    ampicillin (OMNIPEN) 2,000 mg in sodium chloride 0 9 % 100 mL IVPB, 2,000 mg, Intravenous, Q6H, BETTY Pozo, Last Rate: 200 mL/hr at 08/24/18 0816, 2,000 mg at 08/24/18 0816    apixaban (ELIQUIS) tablet 5 mg, 5 mg, Oral, BID, Ronny Hodgson MD, 5 mg at 08/24/18 0810    b complex-vitamin C-folic acid (NEPHROCAPS) capsule 1 capsule, 1 capsule, Oral, Daily With [place], Brigitte Larson PA-C, 1 capsule at 08/23/18 1812    bisacodyl (DULCOLAX) rectal suppository 10 mg, 10 mg, Rectal, Daily PRN, Jaylyn Norwood PA-C, 10 mg at 08/22/18 1421    docusate sodium (COLACE) capsule 100 mg, 100 mg, Oral, BID, Ronny Hodgson MD, 100 mg at 08/24/18 0810    insulin lispro (HumaLOG) 100 units/mL subcutaneous injection 1-5 Units, 1-5 Units, Subcutaneous, TID AC **AND** Fingerstick Glucose (POCT), , , TID AC, Vitaly Thrasher PA-C    insulin lispro (HumaLOG) 100 units/mL subcutaneous injection 1-5 Units, 1-5 Units, Subcutaneous, HS, Vitaly Thrasher PA-C, 2 Units at 08/23/18 2247    menthol-methyl salicylate (BENGAY) 31-03 % cream, , Apply externally, 4x Daily PRN, Darien Clancy PA-C    metoprolol tartrate (LOPRESSOR) partial tablet 12 5 mg, 12 5 mg, Oral, Q12H [facility], Ronny Hodgson MD, 12 5 mg at 08/24/18 0810    ondansetron (ZOFRAN) injection 4 mg, 4 mg, Intravenous, Q6H PRN, BETTY Pozo, 4 mg at 08/24/18 3750    polyethylene glycol (MIRALAX) packet 17 g, 17 g, Oral, Daily, Ronny Hodgson MD, 17 g at 08/24/18 0810    prednisoLONE acetate (PRED FORTE) 1 % ophthalmic suspension 1 drop, 1 drop, Left Eye, 4x Daily, Bob Clayton MD, 1 drop at 08/24/18 0818    Laboratory Results:    Results from last 7 days  Lab Units 08/24/18  0622 08/23/18  1313 08/22/18  1323 08/21/18  2384 08/20/18  0604 08/19/18  0534 08/19/18  0345 08/18/18  2154 08/18/18  1745 08/18/18  1705 08/18/18  1704   WBC Thousand/uL  --  10 39* 9 21  --   --   --  10 84*  --  9 66  --   --   --    HEMOGLOBIN g/dL  --  8 2* 7 8*  --   --   --  8 4*  --  8 1*  --   --   --    I STAT HEMOGLOBIN g/dl  --   --   --   --   --   --   --   --   --   --  10 2* 9 2*   HEMATOCRIT %  --  28 6* 27 9*  --   --   --  29 8*  --  28 4*  --  30* 27*   PLATELETS Thousands/uL  --  154 149  --   --   --  230  --  291  --   --   --    SODIUM mmol/L 145 145 144 143 141 143  --  143 142  < >  --   --    POTASSIUM mmol/L 3 7 3 8 3 6 4 1 3 9 4 6  --  4 5 5 3  < >  --   --    CHLORIDE mmol/L 110* 110* 112* 109* 109* 111*  --  110* 109*  < >  --   --    CO2 mmol/L 28 25 23 22 20* 22  --  22 22  < >  --   --    BUN mg/dL 56* 48* 49* 54* 57* 54*  --  52* 51*  < >  --   --    CREATININE mg/dL 2 79* 2 34* 2 17* 2 62* 2 81* 2 69*  --  2 77* 2 70*  < >  --   --    CALCIUM mg/dL 8 1* 8 3 8 4 8 0* 7 8* 8 2*  --  8 6 8 3  < >  --   --    MAGNESIUM mg/dL  --  2 2  --   --   --   --  2 4  --   --   --   --   --    PHOSPHORUS mg/dL  --   --   --   --   --   --  4 8*  --   --   --   --   --    TOTAL PROTEIN g/dL 5 3* 5 7* 5 5*  --   --  5 8*  --   --  6 0*  --   --   --    GLUCOSE RANDOM mg/dL 139 139 94 79 97 89  --  129 155*  < >  --   --    GLUCOSE, ISTAT mg/dl  --   --   --   --   --   --   --   --   --   --  122 146*   < > = values in this interval not displayed

## 2018-08-24 NOTE — PHYSICAL THERAPY NOTE
Physical Therapy Cancellation Note    Attempt made to see patient for physical therapy session however patient family refusal  Will continue to follow as appropriate        Slick Fitting, PTA

## 2018-08-24 NOTE — ASSESSMENT & PLAN NOTE
· Noted to be in rapid AFib in the evening of 08/22/2018  · According to daughter she has a history of AFib  · Rate controlled  · Continue Eliquis, low-dose metoprolol

## 2018-08-24 NOTE — TREATMENT PLAN
Called as patient has developed SOB and edema    - pt was given 40 IV furosemide    At this juncture I would be concerned if pt had acute event as this AM was lying supine, not  In distress, not in resp distress and now pt is SOB and has developed edema  To note, AST/ALT are improving  Plan    - 80 IV lasix now with albumin as marginal BP  - holt  - ABG  - troponin  - Primary Team is discussing with ICU     Update - CBC with Hb and platelets much lower  Repeat  If accurate, then will need to evaluate for DIC/hemolysis/consumptive issue  rec CT scan without contrast    Update - CT with RP hematoma  Ground glass opacities on CT chest with pl effusion  - DDAVP one time 20 mcg  - give furosemide IV with pRBC  - pt likely has ATN in setting of hemodynamic compromise

## 2018-08-25 PROBLEM — D62 ACUTE BLOOD LOSS ANEMIA: Status: ACTIVE | Noted: 2018-01-01

## 2018-08-25 PROBLEM — K66.1 NONTRAUMATIC RETROPERITONEAL HEMATOMA: Status: ACTIVE | Noted: 2018-01-01

## 2018-08-25 PROBLEM — D50.9 IRON DEFICIENCY ANEMIA: Status: RESOLVED | Noted: 2018-01-01 | Resolved: 2018-01-01

## 2018-08-25 PROBLEM — D68.9 COAGULOPATHY (HCC): Status: ACTIVE | Noted: 2018-01-01

## 2018-08-25 PROBLEM — E87.8 LOW BICARBONATE LEVEL: Status: RESOLVED | Noted: 2018-01-01 | Resolved: 2018-01-01

## 2018-08-25 NOTE — PROGRESS NOTES
Progress Note - Critical Care   Mukund Arroyo 70 y o  female MRN: [id number]  Unit/Bed#:  Encounter: [id number]  Addendum 10:46 AM  She is to sites have been identified on peripheral smear  This raises the concern for TTP in the setting of hemolytic anemia, renal failure, and encephalopathy  Discussed further with Nephrology and will ask Hematology to evaluate  Assessment/Plan:  1  Acute toxic metabolic encephalopathy  · Possibly secondary to uremia and congestive heart failure  · Unlikely from acute hepatic dysfunction although ammonia has increased from 18-34  · May need to consider neurology evaluation if not clinically improving  2  Acute kidney injury  · Discussed with Nephrology, Dr Moody Hamilton  · Will try high-dose Lasix and metolazone  · May need dialysis in the coming days due to extremely poor urine output and significant volume overload  3  Retroperitoneal bleed with acute blood loss anemia  · Monitor hemoglobin and platelet count  · Coagulopathy reversal  · Hemoglobin seems to be stable at this point  4  Coagulopathy secondary to Eliquis  · Last dose 8/24/18 17:25  · Continues to have elevated INR  · Will give vitamin K  · Received [facility]  · Received DDAVP for platelet dysfunction  5  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  · Has not responded to diuretics to this point  · Significant volume overload bilateral pleural effusion, ascites,  6  Abnormal liver function tests with acute hepatic insufficiency  · Workup in progress per Gastroenterology  · Multiple serology sent and are pending  7  Enterococcus UTI  · On ampicillin, D#5/5 ampicillin, will complete after today's dose      Critical Care Time:  48 minutes  Documented critical care time excludes any procedures documented elsewhere  It also excludes any family updates or critical care time by other providers      _____________________________________________________________________    HPI/24hr events:   Required prothrombin concentrate and 1 unit packed red blood cell overnight for retroperitoneal bleeding  Medications:    Current Facility-Administered Medications:  acetaminophen 650 mg Oral Q6H PRN Arash Vidales MD    albuterol 2 puff Inhalation Q4H PRN Arash Vidales MD    ampicillin 2,000 mg Intravenous Q6H BETTY Mariee Last Rate: Stopped (08/25/18 0915)   b complex-vitamin C-folic acid 1 capsule Oral Daily With BRENDA Covrarubias    bisacodyl 10 mg Rectal Daily PRN Jaylyn Norwood PA-C    docusate sodium 100 mg Oral BID Aubrey Ruiz MD    insulin lispro 1-5 Units Subcutaneous TID AC Vitaly Thrasher PA-C    insulin lispro 1-5 Units Subcutaneous HS Vitaly Connolly PA-C    menthol-methyl salicylate  Apply externally 4x Daily PRN Vitaly Connolly PA-C    metoprolol tartrate 12 5 mg Oral Q12H Gordon Pedraza MD    ondansetron 4 mg Intravenous Q6H PRN BETTY Mariee    polyethylene glycol 17 g Oral Daily Aubrey Ruiz MD    prednisoLONE acetate 1 drop Left Eye 4x Daily Arash Vidales MD               Physical exam:  Vitals: Body mass index is 27 25 kg/m²  Blood pressure 147/62, pulse 97, temperature 97 9 °F (36 6 °C), resp  rate 14, height 5' 4" (1 626 m), weight 72 kg (158 lb 11 7 oz), SpO2 100 %  ,  Temp  Min: 96 8 °F (36 °C)  Max: 98 8 °F (37 1 °C)  IBW: 54 7 kg    SpO2: 100 %  SpO2 Activity: At Rest  O2 Device: Nasal cannula      Intake/Output Summary (Last 24 hours) at 08/25/18 1015  Last data filed at 08/25/18 0915   Gross per 24 hour   Intake              920 ml   Output               64 ml   Net              856 ml       Invasive/non-invasive ventilation settings:   Respiratory    Lab Data (Last 4 hours)    None         O2/Vent Data (Last 4 hours)    None              Invasive Devices     Peripherally Inserted Central Catheter Line            PICC Line 08/23/18 1 day          Peripheral Intravenous Line            Peripheral IV 08/24/18 Right Forearm less than 1 day Drain            Urethral Catheter Temperature probe 16 Fr  less than 1 day                  Physical Exam:  Gen:  Still with encephalopathy  She is arousable but fairly slow to respond  HEENT:  Pupils are reactive  Nonicteric  Oropharynx is dry  Neck:  Engorged veins on the neck  No adenopathy or thyromegaly  Trachea midline  Chest:  Shallow  Oxygen saturations are adequate  Diminished at the bases with dullness to percussion  Cor:  Regular, no murmur  Abd:  Soft, benign  Ext:  Anasarca  Neuro:  Not able to move left arm  Weak but able to move left leg right leg, and right arm  No facial droop  Skin:  Thin and fragile but otherwise intact  Has ecchymosis on the left side of her head and over the right eyebrow      Diagnostic Data:  Lab: I have personally reviewed pertinent lab results     CBC:     Results from last 7 days  Lab Units 08/25/18  0800 08/25/18  0319 08/25/18  0010 08/24/18 2116 08/24/18 2040 08/24/18 1938   WBC Thousand/uL  --  10 82*  --  10 15  --  9 61   HEMOGLOBIN g/dL 7 8* 8 0* 6 3* 6 2*  --  6 2*   I STAT HEMOGLOBIN g/dl  --   --   --   --  7 1*  --    HEMATOCRIT %  --  27 4*  --  21 8* 21* 21 8*   PLATELETS Thousands/uL  --  108*  --  101*  101*  --  112*       CMP:     Results from last 7 days  Lab Units 08/25/18  0900 08/25/18  0319 08/24/18 2040 08/24/18 1938 08/24/18  0622 08/23/18  1313   SODIUM mmol/L 146* 144  --  145 145 145   POTASSIUM mmol/L 4 5 4 4  --  4 1 3 7 3 8   CHLORIDE mmol/L 108 107  --  109* 110* 110*   CO2 mmol/L 24 22  --  27 28 25   BUN mg/dL 68* 65*  --  62* 56* 48*   CREATININE mg/dL 3 66* 3 43*  --  3 27* 2 79* 2 34*   CALCIUM mg/dL 8 4 8 3  --  8 0* 8 1* 8 3   TOTAL PROTEIN g/dL  --   --   --  5 2* 5 3* 5 7*   BILIRUBIN TOTAL mg/dL  --   --   --  1 40* 1 20* 1 60*   ALK PHOS U/L  --   --   --  197* 221* 223*   ALT U/L  --   --   --  111* 131* 167*   AST U/L  --   --   --  22 27 40   GLUCOSE RANDOM mg/dL 148* 157*  --  154* 139 139   GLUCOSE, ISTAT mg/dl  --   --  141*  --   --   --      PT/INR:   Lab Results   Component Value Date    INR 3 00 (H) 08/24/2018   ,   Magnesium:   Results from last 7 days  Lab Units 08/25/18  0319 08/24/18  1938 08/23/18  1313   MAGNESIUM mg/dL 2 2 2 2 2 2     Phosphorous:   Results from last 7 days  Lab Units 08/19/18  0345   PHOSPHORUS mg/dL 4 8*       Microbiology:    Results from last 7 days  Lab Units 08/20/18  1613 08/18/18  1811   BLOOD CULTURE  No Growth After 4 Days  --    URINE CULTURE   --  >100,000 cfu/ml Enterococcus faecalis*       Imaging:  CT of the head was viewed personally on the [facility] system  No evidence of hemorrhage  Microangiopathic chronic changes  No evidence of evolving stroke at this point    Cardiac lab/EKG/telemetry/ECHO:   Echocardiogram shows aortic stenosis with an aortic valve area of 1 1 centimeter squared  Ejection fraction 40%  Grade 3 diastolic dysfunction  VTE Prophylaxis:  Eliquis    Code Status: Level 1 - Full Code    Alan Chew MD    Portions of the record may have been created with voice recognition software  Occasional wrong word or "sound a like" substitutions may have occurred due to the inherent limitations of voice recognition software  Read the chart carefully and recognize, using context, where substitutions have occurred

## 2018-08-25 NOTE — PHYSICAL THERAPY NOTE
Physical Therapy Cancellation/Hold Note    Chart check performed  Pt was transferred from Medical/Surgical unit to ICU  [facility] stated pt is not appropriate for continued PT intervention due to current medical status  PT placed on hold  Reorders will be needed to resume PT  PAC notified       Sean Dunn, PT

## 2018-08-25 NOTE — ASSESSMENT & PLAN NOTE
· Seen by cardiology on admission  · In view of renal function no invasive work-up at present per cardiology discussion with daughter  · Ct BB

## 2018-08-25 NOTE — CONSULTS
Consultation - Ricky Phillips 70 y o  female MRN: [id number]    Unit/Bed#:  Encounter: [id number]      Assessment/Plan     Assessment:  In summary, this is a 42-year-old female history of moderate to severe anemia as well as evolving thrombocytopenia, renal failure, mental depression  Presence of schistocytes and mild hyperbilirubinemia support intravascular hemolysis  Studies are requested to confirmed the presence of the same  Tevin test is also requested  FDP and D-dimer are contradictory  Coagulopathy would not usually be a feature of TTP, if present  This could be explained on the basis of hepatic synthetic dysfunction, albumin 2 2  Vitamin K deficiency could also contribute  Iron replacement, vitamin K replacement to be arranged  Currently I would say that this represents a case of possible TTP  Studies should be helpful next 24 hours  Treatment to follow accordingly  Plan:  See above  History of Present Illness     HPI: Ricky Phillips is a 70y o  year old female who presents with Altered mental status and shortness of breath  Patient had been admitted in [place] for fall  She was brought to the [facility] by her daughter  She had recently started some new heart medications  She was felt to have some heart failure at the time of the mid admission with vascular congestion and elevated BNP  LFTs and INR were also elevated  Creatinine on admission, 2 7  This has been stable although recently rising  Blood counts on admission WBC 10 8, hemoglobin 8 4, MCV 85, platelet count 615, normal differential   Hemoglobin has declined to 6 2, transfusion provided  FDP <10, plasminogen 51, schistocytes noted  INR 2 5  Iron 15 TIBC 407 saturation 4%  Ferritin 35  Vitamin B12 3800  TSH 5 4  D-dimer 2200, PTT 47, fibrinogen 307  AST 22, , alk-phos 197, albumin 2 2, bilirubin 1 4    CT chest abdomen pelvis shows a 6 cm retroperitoneal soft tissue density consistent with hematoma  Bilateral airspace opacities suggestive of pneumonia  Anasarca  Inpatient consult to Hematology  Consult performed by: [facility]  Consult ordered by: Mariaelena Tong          Review of Systems   Unable to perform ROS: Mental status change       Historical Information   Past Medical History:   Diagnosis Date    Cardiac disease     Diabetes ([address] )     Hypertension      Past Surgical History:   Procedure Laterality Date    CORONARY ANGIOPLASTY WITH STENT PLACEMENT      IR PICC LINE  8/23/2018     Social History   History   Alcohol Use No     History   Drug Use No     History   Smoking Status    Former Smoker   Smokeless Tobacco    Never Used     Family History: History reviewed  No pertinent family history  Meds/Allergies   all current active meds have been reviewed  No Known Allergies    Objective   Vitals: Blood pressure 160/65, pulse 95, temperature 97 7 °F (36 5 °C), resp  rate 14, height 5' 4" (1 626 m), weight 72 kg (158 lb 11 7 oz), SpO2 100 %  Intake/Output Summary (Last 24 hours) at 08/25/18 1535  Last data filed at 08/25/18 1400   Gross per 24 hour   Intake           976 62 ml   Output               68 ml   Net           908 62 ml     Invasive Devices     Peripherally Inserted Central Catheter Line            PICC Line 08/23/18 2 days          Peripheral Intravenous Line            Peripheral IV 08/24/18 Right Forearm less than 1 day          Drain            Urethral Catheter Temperature probe 16 Fr  less than 1 day                Physical Exam   Constitutional: She appears well-developed  HENT:   Head: Normocephalic  Bruising left temporal region  Eyes: Pupils are equal, round, and reactive to light  Neck: Neck supple  Cardiovascular: Normal rate  No murmur heard  Pulmonary/Chest: No respiratory distress  She has no wheezes  She has no rales  Abdominal: Soft  She exhibits no distension  There is no tenderness  There is no rebound     Genitourinary: Genitourinary Comments: Abbott catheter draining clear urine  No hematuria  Musculoskeletal: She exhibits no edema  Lymphadenopathy:     She has no cervical adenopathy  Neurological: She displays normal reflexes  Patient is lethargic  She is able to answer slowly with 1-2 word answers  Skin: Skin is warm  No rash noted  Few scattered bruises on the the upper extremities and left temporal area  Psychiatric: She has a normal mood and affect  Thought content normal        Lab Results: I have personally reviewed pertinent reports  Imaging Studies: I have personally reviewed pertinent reports  EKG, Pathology, and Other Studies: I have personally reviewed pertinent reports  Code Status: Level 1 - Full Code  Advance Directive and Living Will:      Power of :    POLST:      Counseling / Coordination of Care  Total floor / unit time spent today 40 minutes  Greater than 50% of total time was spent with the patient and / or family counseling and / or coordination of care  A description of the counseling / coordination of care:  See note

## 2018-08-25 NOTE — ASSESSMENT & PLAN NOTE
· Shortness of breath gradually worsening since this evening  · Increased edema over lower extremities  · SOB persisted despite IV Lasix 40 mg   · Renal function and transamnitis worsening  · Per discussion with Dr Fallon Abarca - given Lasix 80 mg IV with Albumin 12 5  · Bipap  · Transfer to stepdown #2   · Monitor respiratory status, I/O, daily weights, renal function  · Nephrology input appreciated

## 2018-08-25 NOTE — ASSESSMENT & PLAN NOTE
· NSTEMI Type 2   · No further cardiac workup recommended by Cardiology  · Due to volume overload on admission

## 2018-08-25 NOTE — PROGRESS NOTES
Progress Note - Josue Shaw 1940, 70 y o  female MRN: [id number]    Unit/Bed#:  Encounter: [id number]    Primary Care Provider: No primary care provider on file     Date and time admitted to hospital: 8/18/2018  4:17 PM        Acute on chronic combined systolic (congestive) and diastolic (congestive) heart failure (HCC)   Assessment & Plan    · Shortness of breath gradually worsening since this evening  · Increased edema over lower extremities  · SOB persisted despite IV Lasix 40 mg   · Renal function and transamnitis worsening  · Per discussion with Dr Camp Cons - given Lasix 80 mg IV with Albumin 12 5  · Bipap  · Transfer to stepdown #2   · Monitor respiratory status, I/O, daily weights, renal function  · Nephrology input appreciated          Acute kidney injury [facility])   Assessment & Plan    · Per discussion with primary care physician's office creatinine was 1 5 on July 12, 2018  · Following recent discharge on August 16, 2018 when she had SANTI on CKD 3 her creatinine was 1 79 on 08/16/2018   · Now oliguric with volume overload - lasix as above per discussion with nephrology - input appreciated          UTI (urinary tract infection)   Assessment & Plan    · Enterococcus faecalis  · Continue Ampicillin(D#4)          * Encephalopathy   Assessment & Plan    · Worsened today  · Toxic-metabolic due to above  · Treat as above, supportive care        Atrial fibrillation [address] [address] )   Assessment & Plan    · On Eliquis, Metoprolol  · Rate controlled        NSTEMI (non-ST elevated myocardial infarction) ([facility] [address] )   Assessment & Plan    · NSTEMI Type 2   · No further cardiac workup recommended by Cardiology  · Due to volume overload on admission         Abnormal LFTs   Assessment & Plan    · Likely from passive congestion  · Had improved but worsened today with new volume overload  · GI input appreciated  · F/u chronic hepatitis panel, EUGENE, AMA, anti smooth muscle antibody        Acute respiratory failure with hypoxia [facility])   Assessment & Plan    · Due to acute on chronic CHF  · CXR with increasing left pleural effusion - pulm consult  · Ct O2 to maintain sats >88%        CAD (coronary artery disease)   Assessment & Plan    · Seen by cardiology on admission  · In view of renal function no invasive work-up at present per cardiology discussion with daughter  · Ct BB        Dysphagia   Assessment & Plan    · Ct modified diet as advised by speech when more alert            VTE Pharmacologic Prophylaxis:   Pharmacologic: Apixaban (Eliquis)  Mechanical VTE Prophylaxis in Place: Yes    Patient Centered Rounds: I have performed bedside rounds with nursing staff today  Discussions with Specialists or Other Care Team Provider: Discussed with Dr Jose Rico    Education and Discussions with Family / Patient: Discussed with daughter at the bedside    Time Spent for Care: 45 minutes  More than 50% of total time spent on counseling and coordination of care as described above  Current Length of Stay: 6 day(s)    Current Patient Status: Inpatient   Certification Statement: The patient will continue to require additional inpatient hospital stay due to acute on chronic CHF    Code Status: Level 1 - Full Code    Subjective:   Severe shortness of breath since this evening - persisted despite 40 mg IV Lasix  Moderate edema noted over thighs  Decreased responsiveness  Objective:     Vitals:   Temp (24hrs), Av 4 °F (36 3 °C), Min:97 2 °F (36 2 °C), Max:97 5 °F (36 4 °C)    HR:  [] 94  Resp:  [18-20] 20  BP: (118-137)/(54-68) 122/68  SpO2:  [99 %-100 %] 100 %  Body mass index is 27 09 kg/m²  Physical Exam:     Physical Exam   HENT:   Head: Atraumatic  Eyes: EOM are normal    Neck: Neck supple  JVD present  No thyromegaly present  Cardiovascular: An irregularly irregular rhythm present  Pulmonary/Chest: She is in respiratory distress  Decreased breath sounds at the bases   Abdominal: Soft   Bowel sounds are normal  She exhibits no distension  There is no tenderness  There is no rebound  Musculoskeletal:   2-3+ lower extremity edema   Neurological:   Drowsy - easily arousable   Skin: Skin is warm and dry  Additional Data:     Labs:      Results from last 7 days  Lab Units 08/24/18 2040 08/24/18  1938   WBC Thousand/uL  --  9 61   HEMOGLOBIN g/dL  --  6 2*   I STAT HEMOGLOBIN g/dl 7 1*  --    HEMATOCRIT % 21* 21 8*   PLATELETS Thousands/uL  --  112*   NEUTROS PCT %  --  71   LYMPHS PCT %  --  17   MONOS PCT %  --  11   EOS PCT %  --  0       Results from last 7 days  Lab Units 08/24/18 2040 08/24/18 1938   SODIUM mmol/L  --  145   POTASSIUM mmol/L  --  4 1   CHLORIDE mmol/L  --  109*   CO2 mmol/L  --  27   BUN mg/dL  --  62*   CREATININE mg/dL  --  3 27*   CALCIUM mg/dL  --  8 0*   TOTAL PROTEIN g/dL  --  5 2*   BILIRUBIN TOTAL mg/dL  --  1 40*   ALK PHOS U/L  --  197*   ALT U/L  --  111*   AST U/L  --  22   GLUCOSE RANDOM mg/dL  --  154*   GLUCOSE, ISTAT mg/dl 141*  --        Results from last 7 days  Lab Units 08/24/18  0622   INR  2 52*       Results from last 7 days  Lab Units 08/24/18  1615 08/24/18  1130 08/24/18  0756 08/23/18  2123 08/23/18  1725 08/23/18  1148 08/23/18  0807 08/22/18  2104 08/22/18  1614 08/22/18  1123 08/22/18  0757 08/21/18  2108   POC GLUCOSE mg/dl 187* 160* 148* 248* 187* 129 119 162* 139 106 125 141*       Results from last 7 days  Lab Units 08/22/18  1323   HEMOGLOBIN A1C % 6 4*         * I Have Reviewed All Lab Data Listed Above  * Additional Pertinent Lab Tests Reviewed:  [place] [address] Admission Reviewed    Recent Cultures (last 7 days):       Results from last 7 days  Lab Units 08/20/18  1613 08/18/18  1811   BLOOD CULTURE  No Growth at 72 hrs   --    URINE CULTURE   --  >100,000 cfu/ml Enterococcus faecalis*       Last 24 Hours Medication List:     Current Facility-Administered Medications:  acetaminophen 650 mg Oral Q6H PRN Ru Mendoza MD    albuterol 2 puff Inhalation Q4H PRN Ru Mendoza MD    ampicillin 2,000 mg Intravenous Q6H BETTY Landaverde Last Rate: 2,000 mg (08/24/18 0213)   b complex-vitamin C-folic acid 1 capsule Oral Daily With BRENDA Covarrubias    bisacodyl 10 mg Rectal Daily PRN Jaylyn Norwood PA-C    docusate sodium 100 mg Oral BID Luis Mills MD    insulin lispro 1-5 Units Subcutaneous TID AC Vitaly Thrasher PA-C    insulin lispro 1-5 Units Subcutaneous HS Vitaly Oocnnor PA-C    menthol-methyl salicylate  Apply externally 4x Daily PRN Vitaly Oconnor PA-C    metoprolol tartrate 12 5 mg Oral Q12H Monique Brown MD    ondansetron 4 mg Intravenous Q6H PRN BETTY Landaverde    polyethylene glycol 17 g Oral Daily Luis Mills MD    prednisoLONE acetate 1 drop Left Eye 4x Daily Ru Mendoza MD         Today, Patient Was Seen By: Luis Mills MD    ** Please Note: Dictation voice to text software may have been used in the creation of this document   **

## 2018-08-25 NOTE — PLAN OF CARE
Patient acutely c/o sob, lungs cta, sats 98-99% and remains on 2L oxygen via nc  Repositioned without relief, remains with diminished urine output throughout the day  D/w advanced NP, cxr pending

## 2018-08-25 NOTE — PROGRESS NOTES
[facility] PROGRESS NOTE   Chema Amado 70 y o  female MRN: [id number]  Unit/Bed#:  Encounter: [id number]  Reason for Consult: SANTI on CKD    ASSESSMENT and PLAN:    79-year-old female with a past medical history of  hypertension, diabetes,CAD, CHF, solitary  functioning kidney, DVT, presenting on 08/18 with worsening shortness of breath  And change in mental state  nephrology is consulted for elevated creatinine   patient was recently admitted in [facility] after a [facility]   Patient was noted to have a creatinine of 2 7 on admission and therefore nephrology team was consulted     1) SANTI ? vs CKD? -creatinine has been stable 2 7-2 8 and this may be the new baseline; then decreased to 2 2 yesterday and slightly higher on 8/23  Per Primary Team discussion, patient had Cr 1 5 in July and then on discharge recently on 8/16, Cr was 1 79 mg/dL        - UA -small blood, 10-20 RBC, trace protein, moderate leukocyte, 0 bacteria  - UPCR -  0 8  - renal us -  small atrophic right kidney which is 6 4 cm dn L 10 cm  - I/O; avoid nephrotoxic agents  - CT with hemorrhagic cyst of L kidney  - Cr now worsening rapidly 8/25 - likely in setting of ATN, but given thrombocytopenia need to rule out other differentials as outlined below  - attempt 200 IV furosemide and 5 metolazone  - then start furosemide gtt 10 mg/hr  - BMP this afternoon to monitor electrolytes closely  - reviewed with Primary Team  - reviewed potential dialysis with Daughter today over phone    2) Vol overload     - Received intravenous diuretics initially - see above for diuretic management  - Echocardiogram- EF 40%, grade III dCHF  - moderate TR, PASP 50 mmHg     3) HTN-     - metoprolol with hold parameters     4) change in mental state     -Urine with Enterococcus  -antibiotics per the primary team  -etiology still unclear as the would expect the patient to start to improve with treatment for UTI at this point  -could consider MRI of the brain without contrast if the patient does not improve     5) Anemia     - acute anemia overnight  - CT with RP bleed  - s/p DDAVP 8/24  - given 1 unit pRBC 8/24  - Hb improving     6) transaminitis     - as per Primary Team  -improving   Possible congestive hepatopathy?    - GI team consulted     7) acid/base     - bicarb stable    8) thrombocytopenia    - need to consider TTP (but generally with TTP would not see coagulopathy) vs DIC vs sepsis vs other  - please have Hematology evaluate  - Ddimer elevated  - Antithrombin III level low  - schistocytes on smear     9) hypernatremia    - monitor for now  Repeat BMP this afternoon    SUBJECTIVE / INTERVAL HISTORY:    Patient transferred to the ICU overnight  There is concern for acute anemia  Underwent CT abdomen pelvis with concern for retroperitoneal bleed  Patient received 1 unit packed red blood cells  Abbott catheter was placed  Urine output has been minimal     OBJECTIVE:  Current Weight: Weight - Scale: 72 kg (158 lb 11 7 oz)  Vitals:    08/25/18 0800 08/25/18 0820 08/25/18 0831 08/25/18 0924   BP:  125/58  147/62   BP Location:       Pulse: 100   97   Resp: 12   14   Temp: 98 1 °F (36 7 °C)   97 9 °F (36 6 °C)   TempSrc:       SpO2: 100%   100%   Weight:   72 kg (158 lb 11 7 oz)    Height:           Intake/Output Summary (Last 24 hours) at 08/25/18 1035  Last data filed at 08/25/18 0915   Gross per 24 hour   Intake              920 ml   Output               64 ml   Net              856 ml     General: NAD  Skin: no rash  HEENT: bruising L forehead/temporal region  Neck: supple  Chest: diminished air intake b/l   Coarse breath sounds b/l  Heart: RRR  Abdomen: soft  Extremities: + dependent edema b/l  Neuro: arousable, but lethargic  Psych: mood and affect appropriate    Medications:    Current Facility-Administered Medications:     acetaminophen (TYLENOL) tablet 650 mg, 650 mg, Oral, Q6H PRN, Phoebe Cole MD, 650 mg at 08/25/18 0846    albuterol (PROVENTIL HFA,VENTOLIN HFA) inhaler 2 puff, 2 puff, Inhalation, Q4H PRN, Kwaku Cordero MD    ampicillin (OMNIPEN) 2,000 mg in sodium chloride 0 9 % 100 mL IVPB, 2,000 mg, Intravenous, Q6H, Ashley Manuel PA-C, Stopped at 08/25/18 0915    b complex-vitamin C-folic acid (NEPHROCAPS) capsule 1 capsule, 1 capsule, Oral, Daily With Dinner, The BRENDA Lrason, 1 capsule at 08/23/18 1812    bisacodyl (DULCOLAX) rectal suppository 10 mg, 10 mg, Rectal, Daily PRN, Jaylyn Norwood PA-C, 10 mg at 08/22/18 1421    docusate sodium (COLACE) capsule 100 mg, 100 mg, Oral, BID, Tiffanie Luna MD, 100 mg at 08/25/18 0846    insulin lispro (HumaLOG) 100 units/mL subcutaneous injection 1-5 Units, 1-5 Units, Subcutaneous, TID AC, 1 Units at 08/24/18 1642 **AND** [CANCELED] Fingerstick Glucose (POCT), , , TID AC, Vitaly Thrasher PA-C    insulin lispro (HumaLOG) 100 units/mL subcutaneous injection 1-5 Units, 1-5 Units, Subcutaneous, HS, Vitaly Thrasher PA-C, 2 Units at 08/23/18 2247    menthol-methyl salicylate (BENGAY) 79-36 % cream, , Apply externally, 4x Daily PRN, Mari Parmar PA-C    metoprolol tartrate (LOPRESSOR) partial tablet 12 5 mg, 12 5 mg, Oral, Q12H [address], Tiffanie Luna MD, 12 5 mg at 08/25/18 0846    ondansetron (ZOFRAN) injection 4 mg, 4 mg, Intravenous, Q6H PRN, BETTY Pozo, 4 mg at 08/24/18 1553    phytonadione (AQUA-MEPHYTON) 10 mg/mL 10 mg in sodium chloride 0 9 % 50 mL IVPB, 10 mg, Intravenous, Once, State Junior PA-C    polyethylene glycol (MIRALAX) packet 17 g, 17 g, Oral, Daily, Tiffanie Luna MD, 17 g at 08/25/18 0846    prednisoLONE acetate (PRED FORTE) 1 % ophthalmic suspension 1 drop, 1 drop, Left Eye, 4x Daily, Kwaku Cordero MD, 1 drop at 08/25/18 0845    Laboratory Results:    Results from last 7 days  Lab Units 08/25/18  0900 08/25/18  0800 08/25/18  0319 08/25/18  0010 08/24/18  2116 08/24/18  2040 08/24/18  1938 08/24/18  0622 08/23/18  1313 08/22/18  1323 08/21/18  0567 08/19/18  0534 08/19/18  0345  08/18/18  1745   WBC Thousand/uL  --   --  10 82*  --  10 15  --  9 61  --  10 39* 9 21  --   --   --  10 84*  --  9 66   HEMOGLOBIN g/dL  --  7 8* 8 0* 6 3* 6 2*  --  6 2*  --  8 2* 7 8*  --   --   --  8 4*  --  8 1*   I STAT HEMOGLOBIN g/dl  --   --   --   --   --  7 1*  --   --   --   --   --   --   --   --   --   --    HEMATOCRIT %  --   --  27 4*  --  21 8* 21* 21 8*  --  28 6* 27 9*  --   --   --  29 8*  --  28 4*   PLATELETS Thousands/uL  --   --  108*  --  101*  101*  --  112*  --  154 149  --   --   --  230  --  291   SODIUM mmol/L 146*  --  144  --   --   --  145 145 145 144 143  < > 143  --   < > 142   POTASSIUM mmol/L 4 5  --  4 4  --   --   --  4 1 3 7 3 8 3 6 4 1  < > 4 6  --   < > 5 3   CHLORIDE mmol/L 108  --  107  --   --   --  109* 110* 110* 112* 109*  < > 111*  --   < > 109*   CO2 mmol/L 24  --  22  --   --   --  27 28 25 23 22  < > 22  --   < > 22   BUN mg/dL 68*  --  65*  --   --   --  62* 56* 48* 49* 54*  < > 54*  --   < > 51*   CREATININE mg/dL 3 66*  --  3 43*  --   --   --  3 27* 2 79* 2 34* 2 17* 2 62*  < > 2 69*  --   < > 2 70*   CALCIUM mg/dL 8 4  --  8 3  --   --   --  8 0* 8 1* 8 3 8 4 8 0*  < > 8 2*  --   < > 8 3   MAGNESIUM mg/dL  --   --  2 2  --   --   --  2 2  --  2 2  --   --   --   --  2 4  --   --    PHOSPHORUS mg/dL  --   --   --   --   --   --   --   --   --   --   --   --   --  4 8*  --   --    TOTAL PROTEIN g/dL  --   --   --   --   --   --  5 2* 5 3* 5 7* 5 5*  --   --  5 8*  --   --  6 0*   GLUCOSE RANDOM mg/dL 148*  --  157*  --   --   --  154* 139 139 94 79  < > 89  --   < > 155*   GLUCOSE, ISTAT mg/dl  --   --   --   --   --  141*  --   --   --   --   --   --   --   --   --   --    < > = values in this interval not displayed

## 2018-08-25 NOTE — ASSESSMENT & PLAN NOTE
· Due to acute on chronic CHF  · CXR with increasing left pleural effusion - pulm consult  · Ct O2 to maintain sats >88%

## 2018-08-25 NOTE — PROGRESS NOTES
Critical Care Accept / Progress Note    Cristal Nolan 70 y o  female MRN: [id number]  Unit/Bed#:  Encounter: [id number]    Attending Physician: Daniela Garcia MD      ______________________________________________________________________  Assessment and Plan:     1  Acute / chronic combined systolic / diastolic heart failure  2  Acute Kidney Injury  3  Toxic Metabolic encephalopathy  4  Urinary Tract Infection  5  Atrial Fibrillation on Eliquis  6  Retroperitoneal Hematoma  7  Acute Blood Loss anemia with Coagulopathy  8  Bilateral Pleural Effusions  9  NSTEMI Type 2  10  Transaminitis  11  CAD s/p CABG      Neuro: continue to monitor mental status  Although had Head CT upon admission which was normal, with worsening mental status now will repeat Stat Head CT, obtain ammonia level  Hold sedative medications  CV: continue telemetry monitoring, HD stable at this time  Continue to maintain goal MAP >65mm/hg  ECHO obtained on 8/19 revealing EF 40% with moderate diffuse hypokinesis, G3DD, moderate aortic stenosis with mild to moderate regurg  Continue to attempt to diurese  Cardiology previously following in consult, will seek additional evaluation as patient now appearing markedly overloaded  In review of cardiology notes, noted underlying multiple co-morbidities and daughter believed no invasive cardiac workup  Pulm: continue SpO2 monitoring and maintain goal >92%, currently on nasal cannula with SpO2 100%, will hold and continue to monitor  CT Chest obtained revealing multifocal airspace opacities suspicious for pneumonia; however moderate bilateral pleural effusions  Work of breathing at this time is stable  BiPAP previously ordered, not necessary at this time  GI: marked encephalopathy, will make NPO at this time and advance if improvement in mental status; however obtain bedside swallow evaluation   CT A/P performed non-contrast revealing a retroperitoneal hematoma measuring 5X6cm, as well as anasarca and perihepatic ascites  Upon notification of read, ordered [facility] and discussed with IR, Dr Valencia Olmos will continue to follow  We discussed risk vs benefit in setting of SANTI  We will attempt to correct coagulopathy and closely monitor  : perform bladder scan, place holt catheter for strict I/O and U/O measures as we attempt to aggressively diurese  Patient previously provided 40mg, discussed with Nephrology - Dr Galileo Echevarria upon transfer to ICU, will order additional 80mg lasix now and continue to reassess U/O  Obtained STAT ABG revealing 7 34/46/114 with a normal HCO3 and K  We will continue to monitor closely with consideration / addition of Lasix gtt as well as need for dialysis  CT Scan A/P however performed revealing retroperitoneal hematoma, therefore SANTI could be due to ATN secondary to blood loss  Additionally noted decrease in platelets which could uremic related, we will provide 20mcg of DDAVP x1 now as discussed with Dr Galileo Echevarria  F/E/N: replete lytes prn, will hold on further IV fluids    ID: continue to monitor temps as well as WBC count  Noted Urine culture >100,000 Enterococcus faecalis susceptible to ampicillin which patient is currently on  We will continue to monitor closely and continue ampicillin  Noted only 1 BC obtained on 8/20 which reveals NGTD  In lieu of further patient decompensation, although afebrile and normal WBC count consider repeat BC; however check a procalcitonin  Heme: continue to monitor Hgb/Plt  Noted 2g drop since am, which prompted the CT Head as well as C/A/P  At same time initiated obtaining type and screen as well as further workup which included DIC panel and peripheral smear; follow up results; however noted Retroperitoneal hematoma  1U PRBC ordered; however antibodies so taking longer to administer due to screening, closely monitoring hemodynamics  With the administration of PRBCs we will provide additional 80mg Lasix    As on eliquis, eliquis was discontinued, we will administer KCentra STAT in attempt to reverse coagulopathy and stop bleeding  CT discussed urgently with IR / Dr Davion Henry as noted above  We will monitor Hgb [id number], if further evidence of bleeding or hemodynamic instability will perform IR evaluation  Continue bilateral SCDs  With all the above I did discuss with daughter need for emergent need for blood as well as discussed all risks vs benefits of procedures with SANTI as well as risk of stroke in stopping eliquis and reversing coagulopathy  Endo:  Continue to hold glipizide,  Place on accu-checks q6h while NPO and provide SSI coverage     Msk/Skin: reposition and turn q2h, monitor for breakdown    Disposition: upgrade to ICU on the [facility] service of Dr Darcie Fleming  Code Status: Level 1 - Full Code    Counseling / Coordination of Care  Total time spent today 75 minutes  Greater than 50% of total time was spent with the patient and / or family counseling and / or coordination of care  A description of the counseling / coordination of care: review of EMR, development of care and treatment plan in the setting of acute decompensation  Discussed with consultant services as well as on call radiologist and IR physician  TOTAL CRITICAL CARE TIME OF 40 Mins providing direct bedside management of this critically ill patient  ______________________________________________________________________      Chief Complaint: None offered    24 Hour Events: Patient seen and evaluated after receiving call from [facility] attending during the evening  Patient transferred to [place] [address] and was upgraded to ICU  In review of EMR as well as discussion with daughter and other service lines  Patient is a 68year old female whom was initially admitted on 8/18 after presenting with shortness of breath and reported change in mental status  Patient has an extensive PMH of HTN, DM, HLD, CAD s/p CABG as well as CHF    Daughter reports that the patient had a recent hospital admission in [place] after an apparent fall  Daughter further reported patient believed to become bradycardic which lead patient to fall  Her medication were adjusted during that hospital stay and the patient was discharged  Daughter was planning to bring her mother to live in [place]  While on their drive, patient became more short of breath with again change in mental status  On admission patient was noted to have a Cr of 2 7  Since admission we have been able to obtain out of hospital records finding that patients Cr in July was 1 5; however upon d/c for [facility] it was 1 79  Nephrology and cardiology have been following the patient throughout her admission; however now worsening renal failure with volume overload  Attempts to diurese have been unsuccessful and renal function continues to worsen  This evening there was concern was patient becoming more lethargic and appeared short of breath  Patient was transferred to the [facility] unit and soon after upgraded to ICU due to acute findings and further necessary treatment modalities  Review of Systems   Unable to perform ROS: Mental status change      ______________________________________________________________________    Physical Exam:     General: 69 y/o F with an occasional moan to painful stimuli, she will not follow commands  HEENT: Normorcephalic, with noted old skin injury to forehead   Pupils equal at 3mm, reactive; however sluggish, oropharynx patent, membranes appear dry  Neck: Trachea midline with noted JVD  Heart: Irregular rate and rhythm, with noted murmur; however no rub  Lungs: course breath sounds with rales bilaterally, breathing non-labored and not tachypnic at this time  Abd: soft, non-tender, no guarding to palpation, active BS noted  : no holt initially however noted edema about mons pubis and lower abdomen  Ext: distal pulses intact  Neuro: GCS 9  ______________________________________________________________________  Vitals:    18 2145 18 2220 18 2300 18 0000   BP: 119/58 116/57 114/56 115/56   BP Location:       Pulse: 103 100 101 100   Resp: 14 15 16 16   Temp: 98 8 °F (37 1 °C) 98 6 °F (37 °C) 98 2 °F (36 8 °C) 97 9 °F (36 6 °C)   TempSrc:       SpO2: 99% 100% 100% 100%   Weight:       Height:           Temperature:   Temp (24hrs), Av °F (36 7 °C), Min:97 2 °F (36 2 °C), Max:98 8 °F (37 1 °C)    Current Temperature: 98 8 °F (37 1 °C)  Weights:   IBW: 54 7 kg    Body mass index is 27 09 kg/m²  Weight (last 2 days)     Date/Time   Weight    18 0544  71 6 (157 85)    18 0522  71 8 (158 29)    18 0600  70 8 (156 09)    Weight: patient unable to stand at 18 0600            Hemodynamic Monitoring:  N/A     Non-Invasive/Invasive Ventilation Settings:  Respiratory    Lab Data (Last 4 hours)    None         O2/Vent Data (Last 4 hours)    None              No results found for: PHART, HQL6PUI, PO2ART, IOC1ZIT, R0PAVGOC, BEART, SOURCE  SpO2: SpO2: 100 %  Intake and Outputs:  I/O        07 -  0700  07 -  07    P  O  537     Total Intake(mL/kg) 537 (7 5)     Urine (mL/kg/hr) 600 (0 3) 261 (0 2)    Total Output 600 261    Net -63 -261          Unmeasured Emesis Occurrence 1 x         UOP: 5 ml/hr   Nutrition:        Diet Orders            Start     Ordered    18  Diet NPO  Diet effective now     Comments:  Acute encephalopathy   Question Answer Comment   Diet Type NPO    RD to adjust diet per protocol?  Yes        18 2116    18 1506  Dietary nutrition supplements  Once     Question Answer Comment   Select Supplement: Ensure Compact-Vanilla    Frequency Breakfast, Lunch        18 1505    18  Room Service  Once     Question:  Type of Service  Answer:  Room Service - Appropriate with Assistance    18          Labs:     Results from last 7 days  Lab Units 08/24/18 2116 08/24/18 2040 08/24/18 1938 08/23/18  1313 08/22/18  1323   WBC Thousand/uL 10 15  --  9 61 10 39* 9 21   HEMOGLOBIN g/dL 6 2*  --  6 2* 8 2* 7 8*   I STAT HEMOGLOBIN g/dl  --  7 1*  --   --   --    HEMATOCRIT % 21 8* 21* 21 8* 28 6* 27 9*   PLATELETS Thousands/uL 101*  101*  --  112* 154 149   NEUTROS PCT % 72  --  71  --  78*   MONOS PCT % 10  --  11  --  8       Results from last 7 days  Lab Units 08/24/18 2040 08/24/18 1938 08/24/18  0622 08/23/18  1313   SODIUM mmol/L  --  145 145 145   POTASSIUM mmol/L  --  4 1 3 7 3 8   CHLORIDE mmol/L  --  109* 110* 110*   CO2 mmol/L  --  27 28 25   BUN mg/dL  --  62* 56* 48*   CREATININE mg/dL  --  3 27* 2 79* 2 34*   CALCIUM mg/dL  --  8 0* 8 1* 8 3   TOTAL PROTEIN g/dL  --  5 2* 5 3* 5 7*   BILIRUBIN TOTAL mg/dL  --  1 40* 1 20* 1 60*   ALK PHOS U/L  --  197* 221* 223*   ALT U/L  --  111* 131* 167*   AST U/L  --  22 27 40   GLUCOSE RANDOM mg/dL  --  154* 139 139   GLUCOSE, ISTAT mg/dl 141*  --   --   --        Results from last 7 days  Lab Units 08/24/18 1938 08/23/18  1313 08/19/18  0345   MAGNESIUM mg/dL 2 2 2 2 2 4     Lab Results   Component Value Date    PHOS 4 8 (H) 08/19/2018        Results from last 7 days  Lab Units 08/24/18 2116 08/24/18 0622 08/20/18  0628 08/18/18  1745   INR  3 00* 2 52* 2 54* 5 65*   PTT seconds 47*  --   --  45*       0  Lab Value Date/Time   TROPONINI 0 38 (H) 08/24/2018 2116   TROPONINI 0 38 (H) 08/24/2018 1954   TROPONINI 0 77 (H) 08/19/2018 0345   TROPONINI 1 04 (H) 08/19/2018 0117   TROPONINI 1 12 (H) 08/18/2018 2200   TROPONINI 0 96 (H) 08/18/2018 1745       Results from last 7 days  Lab Units 08/18/18 2152 08/18/18  1745   LACTIC ACID mmol/L 2 0 2 2*     ABG:No results found for: PHART, NML7CKS, PO2ART, YPB0DQW, T1ILVLHT, BEART, SOURCE  Imaging: CT Head as well as C/A/P obtained and reviewed as noted in assessment/plan I have personally reviewed pertinent reports     and I have personally reviewed pertinent films in PACS  EKG: Telemetry monitor reviewed noting Afib  Micro:  Lab Results   Component Value Date    BLOODCX No Growth After 4 Days  08/20/2018    URINECX >100,000 cfu/ml Enterococcus faecalis (A) 08/18/2018     Allergies: No Known Allergies  Medications:   Scheduled Meds:  Current Facility-Administered Medications:  acetaminophen 650 mg Oral Q6H PRN Pavan Garduno MD    albuterol 2 puff Inhalation Q4H PRN Pavan Garduno MD    ampicillin 2,000 mg Intravenous Q6H BETTY Christensen Last Rate: 2,000 mg (08/24/18 2222)   b complex-vitamin C-folic acid 1 capsule Oral Daily With BRENDA Covarrubias    bisacodyl 10 mg Rectal Daily PRN Jaylyn Norwood PA-C    docusate sodium 100 mg Oral BID Morris Lu MD    insulin lispro 1-5 Units Subcutaneous TID AC Vitaly Thrasher PA-C    insulin lispro 1-5 Units Subcutaneous HS Vitaly Puckett PA-C    menthol-methyl salicylate  Apply externally 4x Daily PRN Vitaly Puckett PA-C    metoprolol tartrate 12 5 mg Oral Q12H Aleyda Arroyo MD    ondansetron 4 mg Intravenous Q6H PRN BETTY Christensen    polyethylene glycol 17 g Oral Daily Morris Lu MD    prednisoLONE acetate 1 drop Left Eye 4x Daily Pavan Garduno MD      Continuous Infusions:   PRN Meds:    acetaminophen 650 mg Q6H PRN   albuterol 2 puff Q4H PRN   bisacodyl 10 mg Daily PRN   menthol-methyl salicylate  4x Daily PRN   ondansetron 4 mg Q6H PRN     VTE Pharmacologic Prophylaxis: Pharmacologic VTE Prophylaxis contraindicated due to previously on eliquis, now coagulopathy with retroperitoneal hematoma  VTE Mechanical Prophylaxis: sequential compression device  Invasive lines and devices: Invasive Devices     Peripherally Inserted Central Catheter Line            PICC Line 08/23/18 1 day                     Portions of the record may have been created with voice recognition software    Occasional wrong word or "sound a like" substitutions may have occurred due to the inherent limitations of voice recognition software  Read the chart carefully and recognize, using context, where substitutions have occurred      Farhan Suarez PA-C

## 2018-08-25 NOTE — ASSESSMENT & PLAN NOTE
· Per discussion with primary care physician's office creatinine was 1 5 on July 12, 2018  · Following recent discharge on August 16, 2018 when she had SANTI on CKD 3 her creatinine was 1 79 on 08/16/2018   · Now oliguric with volume overload - lasix as above per discussion with nephrology - input appreciated

## 2018-08-25 NOTE — ASSESSMENT & PLAN NOTE
· Likely from passive congestion  · Had improved but worsened today with new volume overload  · GI input appreciated  · F/u chronic hepatitis panel, EUGENE, AMA, anti smooth muscle antibody

## 2018-08-26 NOTE — SPEECH THERAPY NOTE
Speech Language/Pathology    Speech/Language Pathology Progress Note    Patient Name: Roger Zimmerman  [id number] Date: 8/26/2018     Problem List  Patient Active Problem List   Diagnosis    History of recent fall    Supratherapeutic INR    [facility]    [facility]non[facility]ST elevated [facility]    Hypoxia    Encephalopathy    [facility]    Essential hypertension    UTI (urinary tract infection)    Acute on chronic combined systolic (congestive) and diastolic (congestive) heart failure (HCC)    Atrial fibrillation (HCC)    Abnormal LFTs    Constipation    Acute respiratory failure with hypoxia ([facility]    CAD (coronary artery disease)    Dysphagia    Nontraumatic retroperitoneal hematoma    Coagulopathy (HCC)    Acute blood loss anemia        Past Medical History  Past Medical History:   Diagnosis Date    Cardiac disease     Diabetes ([facility] )     Hypertension         Past Surgical History  Past Surgical History:   Procedure Laterality Date    CORONARY ANGIOPLASTY WITH STENT PLACEMENT      IR PICC LINE  8/23/2018         Medical chart reviewed for updates  Aware that patient is NPO status  SLP attempted to conduct follow up therapy session  Patient was initially evaluated on 8/21 and recommended for pureed solids and thin liquids  Per nurse, patient's alertness today is poor and asked SLP to follow up with patient tomorrow  Please continue short term NPO status with short term alternate nutrition per physician   Will follow

## 2018-08-26 NOTE — PROGRESS NOTES
[facility] PROGRESS NOTE   Jason Alt 70 y o  female MRN: [id number]  Unit/Bed#:  Encounter: [id number]  Reason for Consult: SANTI on CKD    ASSESSMENT and PLAN:    47-year-old female with a past medical history of  hypertension, diabetes,CAD, CHF, solitary  functioning kidney, DVT, presenting on 08/18 with worsening shortness of breath  And change in mental state  nephrology is consulted for elevated creatinine   patient was recently admitted in [facility] after a fall   Patient was noted to have a creatinine of 2 7 on admission and therefore nephrology team was consulted     1) SANTI ? vs CKD? -creatinine has been stable 2 7-2 8 and this may be the new baseline; then decreased to 2 2 yesterday and slightly higher on 8/23  Per Primary Team discussion, patient had Cr 1 5 in July and then on discharge recently on 8/16, Cr was 1 79 mg/dL     - UA -small blood, 10-20 RBC, trace protein, moderate leukocyte, 0 bacteria  - UPCR -  0 8  - renal us -  small atrophic right kidney which is 6 4 cm dn L 10 cm  - I/O; avoid nephrotoxic agents  - CT with hemorrhagic cyst of L kidney  - Cr now worsening likely in setting of ATN vs other  - started on CRRT 8/25 in evening  - temp HD cath on 8/25 - site with oozing   Monitoring for now  - plan to transition to [facility], [place] 8/27  - reviewed with family at bedside     2) Vol overload     - Received intravenous diuretics initially - but remains anuric/oliguric  - Echocardiogram- EF 40%, grade III dCHF  - moderate TR, PASP 50 mmHg     3) HTN-     - metoprolol with hold parameters     4) change in mental state     -Urine with Enterococcus  -antibiotics per the primary team  -etiology still unclear as the would expect the patient to start to improve with treatment for UTI at this point  -could consider MRI of the brain without contrast if the patient does not improve  - could consider broad spectrum antibiotics     5) Anemia     - acute anemia overnight  - CT with RP bleed  - s/p DDAVP 8/24  - given 1 unit pRBC 8/24  - Hb improving  - Hematology on board - receiving IV iron per Heme     6) transaminitis     - as per Primary Team  - worsening  Unclear etiology   - GI team consulted and on board     7) acid/base     - bicarb stable  - lactic acid 2 6 - trend level     8) thrombocytopenia     - need to consider TTP (but generally with TTP would not see coagulopathy) vs DIC vs sepsis vs other  - Hematology on board and final studies pending  - Ddimer elevated  - Antithrombin III level low  - schistocytes on smear      9) hypernatremia     - improving       SUBJECTIVE / INTERVAL HISTORY:  Pt started on CRRT overnight due to vol overload    OBJECTIVE:  Current Weight: Weight - Scale: 73 3 kg (161 lb 9 6 oz)  Vitals:    08/26/18 0845 08/26/18 0900 08/26/18 0915 08/26/18 0945   BP: 127/59 100/50 113/54 123/61   Pulse: (!) 106 (!) 106 101 (!) 107   Resp: 14 14 14 13   Temp: 97 7 °F (36 5 °C) 97 9 °F (36 6 °C) 97 9 °F (36 6 °C) 98 1 °F (36 7 °C)   TempSrc:       SpO2: 98% 99% 98% 99%   Weight:       Height:           Intake/Output Summary (Last 24 hours) at 08/26/18 1028  Last data filed at 08/26/18 0900   Gross per 24 hour   Intake          1401 27 ml   Output             1286 ml   Net           115 27 ml     General: NAD  Skin: no rash  HEENT: bruising L forehead/temporal region  Neck: supple  Chest: diminished air intake b/l   Coarse breath sounds b/l  Heart: RRR  Abdomen: soft  Extremities: + dependent edema b/l; oozing at dialysis cath site  Neuro: arousable, but lethargic  Psych: mood and affect appropriate    Medications:    Current Facility-Administered Medications:     acetaminophen (TYLENOL) tablet 650 mg, 650 mg, Oral, Q6H PRN, Srinath Bautista MD, 650 mg at 08/25/18 2051    albuterol (PROVENTIL HFA,VENTOLIN HFA) inhaler 2 puff, 2 puff, Inhalation, Q4H PRN, Srinath Bautista MD    ampicillin (OMNIPEN) 2,000 mg in sodium chloride 0 9 % 100 mL IVPB, 2,000 mg, Intravenous, Q6H, [place], [place], Last Rate: 200 mL/hr at 08/26/18 4744, 2,000 mg at 08/26/18 4594    b complex-vitamin C-folic acid (NEPHROCAPS) capsule 1 capsule, 1 capsule, Oral, Daily With Dinner, Thelma Kumar PA-C, 1 capsule at 08/23/18 1812    bisacodyl (DULCOLAX) rectal suppository 10 mg, 10 mg, Rectal, Daily PRN, Jaylyn Norwood PA-C, 10 mg at 08/22/18 1421    docusate sodium (COLACE) capsule 100 mg, 100 mg, Oral, BID, Tiffanie Luna MD, 100 mg at 08/25/18 0846    insulin lispro (HumaLOG) 100 units/mL subcutaneous injection 1-6 Units, 1-6 Units, Subcutaneous, Q6H [facility], 1 Units at 08/25/18 1213 **AND** Fingerstick Glucose (POCT), , , Q6H, Ashley Payton PA-C    iron sucrose (VENOFER) 300 mg in sodium chloride 0 9 % 250 mL IVPB, 300 mg, Intravenous, Daily, Ankush Villatoro , Last Rate: 176 7 mL/hr at 08/26/18 1010, 300 mg at 08/26/18 1010    menthol-methyl salicylate (BENGAY) 22-66 % cream, , Apply externally, 4x Daily PRN, Vitaly Thrasher PA-C    metolazone (ZAROXOLYN) tablet 5 mg, 5 mg, Oral, Daily, Ashley Manuel PA-C, 5 mg at 08/25/18 1216    metoprolol tartrate (LOPRESSOR) partial tablet 12 5 mg, 12 5 mg, Oral, Q12H [facility], Tiffanie Luna MD, 12 5 mg at 08/25/18 0846    NxStage K 4/Ca 3 dialysis solution (RFP-401) 20,000 mL, 20,000 mL, Dialysis, Continuous, Norberto Guaman MD, 20,000 mL at 08/26/18 0722    ondansetron (ZOFRAN) injection 4 mg, 4 mg, Intravenous, Q6H PRN, BETTY Pozo, 4 mg at 08/24/18 1121    pantoprazole (PROTONIX) injection 40 mg, 40 mg, Intravenous, Daily, Ashley Manuel PA-C, 40 mg at 08/26/18 1002    polyethylene glycol (MIRALAX) packet 17 g, 17 g, Oral, Daily, Tiffanie Luna MD, 17 g at 08/25/18 0846    prednisoLONE acetate (PRED FORTE) 1 % ophthalmic suspension 1 drop, 1 drop, Left Eye, 4x Daily, Kwaku Cordero MD, 1 drop at 08/26/18 1014    Laboratory Results:    Results from last 7 days  Lab Units 08/26/18  0440 08/25/18  2213 08/25/18  2211 08/25/18  1902 08/25/18  1628 08/25/18  1210 08/25/18  0900 08/25/18  0800 08/25/18  0319 08/25/18  0010 08/24/18  2116 08/24/18  2040 08/24/18  1938 08/24/18  0622 08/23/18  1313 08/22/18  1323   WBC Thousand/uL 15 89*  --   --   --   --   --   --   --  10 82*  --  10 15  --  9 61  --  10 39* 9 21   HEMOGLOBIN g/dL 9 0* 9 2*  --   --  7 6* 7 8*  --  7 8* 8 0* 6 3* 6 2*  --  6 2*  --  8 2* 7 8*   I STAT HEMOGLOBIN g/dl  --   --   --   --   --   --   --   --   --   --   --  7 1*  --   --   --   --    HEMATOCRIT % 30 2*  --   --   --   --   --   --   --  27 4*  --  21 8* 21* 21 8*  --  28 6* 27 9*   PLATELETS Thousands/uL 60*  --   --   --   --   --   --   --  108*  --  101*  101*  --  112*  --  154 149   SODIUM mmol/L 144  --  146* 146*  --  145 146*  --  144  --   --   --  145 145 145 144   POTASSIUM mmol/L 4 6  --  4 9 4 6  --  4 5 4 5  --  4 4  --   --   --  4 1 3 7 3 8 3 6   CHLORIDE mmol/L 107  --  108 109*  --  108 108  --  107  --   --   --  109* 110* 110* 112*   CO2 mmol/L 22  --  23 24  --  23 24  --  22  --   --   --  27 28 25 23   BUN mg/dL 57*  --  74* 71*  --  69* 68*  --  65*  --   --   --  62* 56* 48* 49*   CREATININE mg/dL 2 93*  --  3 87* 3 85*  --  3 67* 3 66*  --  3 43*  --   --   --  3 27* 2 79* 2 34* 2 17*   CALCIUM mg/dL 9 0  --  8 4 8 3  --  8 4 8 4  --  8 3  --   --   --  8 0* 8 1* 8 3 8 4   MAGNESIUM mg/dL 2 1  --  2 3  --   --   --   --   --  2 2  --   --   --  2 2  --  2 2  --    PHOSPHORUS mg/dL 5 1*  --  6 9*  --   --   --   --   --   --   --   --   --   --   --   --   --    TOTAL PROTEIN g/dL 5 7*  --   --   --   --   --  5 7*  --   --   --   --   --  5 2* 5 3* 5 7* 5 5*   GLUCOSE RANDOM mg/dL 108  --  119 135  --  153* 148*  --  157*  --   --   --  154* 139 139 94   GLUCOSE, ISTAT mg/dl  --   --   --   --   --   --   --   --   --   --   --  141*  --   --   --   --

## 2018-08-26 NOTE — TREATMENT PLAN
Patient's daughter consented dialysis  Dialysis is required due to volume overload, anuria urgently tonight  Reviewed in detail this is high risk procedure given coagulopathy  Family is in agreement       Reviewed with Primary Team

## 2018-08-26 NOTE — PROGRESS NOTES
Progress Note - Jason Alt 70 y o  female MRN: [id number]    Unit/Bed#:  Encounter: [id number]      Assessment:  In summary, this is a 61-year-old female history of anemia, thrombocytopenia, renal dysfunction, hepatic dysfunction, mental status change  Laboratories remain equivocal   For example,  indicates scant hemolysis  Elevated bilirubin 3 4 is out of proportion to this and suggests independent hepatic processing dysfunction rather than hemolysis as an explanation  Vitamin K administered  Folic acid to be initiated  Some labs are still pending  In the next 1 or 2 days we will initiate pheresis if the picture remains unclear  Plan:  See above  Subjective:   Patient continues with oliguria  She is arousable at times but is generally lethargic  CVVH has been initiated  Objective:  Sed rate 5, , INR 2 8, creatinine 2 9, bilirubin 3 4, alk-phos 187, AST 72,   WBC is 15 8, hemoglobin 9 0, platelet count 60  Normal differential, nucleated red blood cells 2  Sammy negative  Homocystine 13 1  Vitals: Blood pressure 123/61, pulse (!) 107, temperature 98 1 °F (36 7 °C), resp  rate 13, height 5' 4" (1 626 m), weight 73 3 kg (161 lb 9 6 oz), SpO2 99 %  ,Body mass index is 27 74 kg/m²  Intake/Output Summary (Last 24 hours) at 08/26/18 1225  Last data filed at 08/26/18 1100   Gross per 24 hour   Intake          1740 52 ml   Output             1414 ml   Net           326 52 ml       Physical Exam:   General Appearance:   Lethargic but arousable  Eyes:    PERRL   Ears:    Normal external ear canals, both ears   Nose:   Nares normal, septum midline   Throat:   Mucosa moist  Pharynx without injection      Neck:   Supple       Lungs:     Clear to auscultation bilaterally   Chest Wall:    No tenderness or deformity    Heart:    Regular rate and rhythm       Abdomen:     Soft, non-tender, bowel sounds +, no organomegaly           Extremities:   Extremities no cyanosis  Bilateral lower extremity edema at the ankles  Scattered bruises on the forearms  Skin:   no rash or icterus  Lymph nodes:   Cervical, supraclavicular, and axillary nodes normal   Neurologic:   CNII-XII intact, normal strength, sensation and reflexes     throughout          Invasive Devices     Peripherally Inserted Central Catheter Line            PICC Line 08/23/18 2 days          Central Venous Catheter Line            CVC Central Lines 08/25/18 Triple 16cm less than 1 day          Peripheral Intravenous Line            Peripheral IV 08/24/18 Right Forearm 1 day          Drain            Urethral Catheter Temperature probe 16 Fr  1 day                Lab, Imaging and other studies: I have personally reviewed pertinent reports

## 2018-08-26 NOTE — PROGRESS NOTES
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  9  Bilateral pleural effusion  10  NSTEMI Type 2  11  Transaminitis   12  Enterococcus UTI    Neuro: continue to monitor mental status  Repeat CT Head negative yesterday  Continue to hold sedative medications and monitor mental status  Believe encephalopathy related to uremia  If continued decline consider additional imaging and/or neurology consult  CV: continue telemetry monitoring, HD stable at this time  Continue to maintain goal MAP >65mm/hg  Attempts at diuresis with Lasix boluses, metolazone, and lasix gtt have been unsuccessful with fluid removal   Echo EF 40% G3DD moderate TR  Overnight while being transfused additional PRBCs worsening volume overload prompting RIJ Dialysis access and initiation of CVVH  Maintain temporary dialysis access, noted slow ooze from insertion site, no surrounding hematoma  Pulm: continue SpO2 monitoring and maintain >92%  Moderate bilateral pleural effusion  Markedly volume overload    GI: maintain NPO and advance if clinical improvement as well as mental status improvement; however will require bedside swallow evaluation  CT obtain on 8/24 revealed retroperitoneal hematoma, reversal of eliquis coagulopathy attempted  Repeat abdominal exams stable  Will need to consider feeding as has not had diet over past 2 days with encephalopathy  : maintain holt with strict I/Os and U/O measures  Initiation of CVVH secondary to volume overload and no clinical response to lasix and metolazone  Avoid nephrotoxic agents  Enterococcus UTI, antibiotics complete  Nephrology continues to follow in consult    F/E/N: replete lytes per dialysis protocol    ID: continue to monitor temps as well as WBC count  Ampicillin completed yesterday after a 5 day course for enterococcus UTI  This am noted hypothermia, will follow WBC, obtain lactic acid, repeat blood cultures as well as procalcitonin  Heme: monitor Hgb/Plt  Peripheral smear with schistocytes  Remains coagulopathic this am  Hematology consulted and following  Follow up on pending ADAMS13, haptoglobin, methylmalonic acid, RF, sed rate, LDH and homocysteine as still concern for possible TTP  Additional unit of PRBCs last evening leading to volume overload  Will place order for additional unit of PRBCs on hold in lieu of antibodies with previously finding of retroperitoneal bleed  Hold chemical DVT PPx in this setting; however continue bilateral SCDs  Endo: continue accu-checks q6h while NPO with SSI coverage  Continue to hold glipizide     Msk/Skin: reposition and turn q3h, monitor for breakdown    Disposition: maintain ICU level of care on [facility] service of Dr Surendra Erazo  Family updated overnight with initiation of CRRT with RIJ dialysis line placement with consent obtained with dialysis consent  Code Status: Level 1 - Full Code    Counseling / Coordination of Care  Total time spent today 45 minutes  Greater than 50% of total time was spent with the patient and / or family counseling and / or coordination of care  A description of the counseling / coordination of care: review of EMR, discussion with consultant services, development of care and treatment plan   ______________________________________________________________________    Chief Complaint: None offered    24 Hour Events:   Patient seen and evaluated   Last evening repeat hemoglobin 7 6 prompting additional unit of PRBCs being transfused  During transfusion patient with increased work of breathing and tachypnea  Further discussion with nephrology and daughter, agreeable to initiation of CVVH  RIJ 16cm temporary dialysis catheter placed and CVVH initiated currently running -100  This am noted hypothermia  Review of Systems   Unable to perform ROS: Mental status change     ______________________________________________________________________    Physical Exam:     General: 69 y/o F with still occasional moans to painful stimuli, not following commands  HEENT: Normocephalic, atraumatic  Pupils 3mm, reactive, oropharynx patent, membranes moist  Neck: Supple, trachea midline, noted JVD, R IJ Dialysis catheter in place, noted slight ooze from insertion site  Heart: Irregular rate, and rhythm, noted murmur, no rub  Lungs: course breath sounds with rales bilaterally  Abd: Soft, no guarding or grimace to palpation, hypoactive BS noted  : holt with noted edema about lower abdomen  Ext: distal pulses pulses intact  Neuro: GCS 9  ______________________________________________________________________  Vitals:    18 0200 18 0301 18 0401 18 0501   BP: 114/56 129/60 100/58 124/63   Pulse: 101 98 99 101   Resp: 13 12 12 (!) 11   Temp: (!) 95 7 °F (35 4 °C) (!) 95 °F (35 °C) (!) 95 °F (35 °C) (!) 95 5 °F (35 3 °C)   TempSrc:       SpO2: 100% 100% 100% 99%   Weight:       Height:           Temperature:   Temp (24hrs), Av 2 °F (36 2 °C), Min:95 °F (35 °C), Max:98 1 °F (36 7 °C)    Current Temperature: (!) 95 5 °F (35 3 °C)  Weights:   IBW: 54 7 kg    Body mass index is 27 25 kg/m²    Weight (last 2 days)     Date/Time   Weight    18 2113  74 (163 14)    18 0831  72 (158 73)    18 0600  74 7 (164 68)    18 0544  71 6 (157 85)            Hemodynamic Monitoring:  N/A     Non-Invasive/Invasive Ventilation Settings:  Respiratory    Lab Data (Last 4 hours)    None O2/Vent Data (Last 4 hours)    None              No results found for: PHART, FIW0JVU, PO2ART, OLC4LXT, V2UPEJML, BEART, SOURCE  SpO2: SpO2: 99 %  Intake and Outputs:  I/O       08/24 0701 - 08/25 0700 08/25 0701 - 08/26 0700    P  O  0     I V  (mL/kg)  20 6 (0 3)    Blood 350 500    IV Piggyback 470 482 7    Total Intake(mL/kg) 820 (11) 1003 3 (13 9)    Urine (mL/kg/hr) 271 (0 2) 14 (0)    Total Output 271 14    Net +549 +989 3              UOP: 0 ml/hr   Nutrition:        Diet Orders            Start     Ordered    08/24/18 2116  Diet NPO  Diet effective now     Comments:  Acute encephalopathy   Question Answer Comment   Diet Type NPO    RD to adjust diet per protocol? Yes        08/24/18 2116 08/22/18 1506  Dietary nutrition supplements  Once     Question Answer Comment   Select Supplement: Ensure Compact-Vanilla    Frequency Breakfast, Lunch        08/22/18 1505    08/18/18 2139  Room Service  Once     Question:  Type of Service  Answer:  Room Service - Appropriate with Assistance    08/18/18 2138          Labs:     Results from last 7 days  Lab Units 08/26/18  0440 08/25/18  2213 08/25/18  1628  08/25/18 0319  08/24/18 2116   WBC Thousand/uL 15 89*  --   --   --  10 82*  --  10 15   HEMOGLOBIN g/dL 9 0* 9 2* 7 6*  < > 8 0*  < > 6 2*   HEMATOCRIT % 30 2*  --   --   --  27 4*  --  21 8*   PLATELETS Thousands/uL 60*  --   --   --  108*  --  101*  101*   NEUTROS PCT % 84*  --   --   --  84*  --  72   MONOS PCT % 9  --   --   --  6  --  10   < > = values in this interval not displayed      Results from last 7 days  Lab Units 08/26/18  0440 08/25/18  2211 08/25/18  1902  08/25/18  0900  08/24/18  1938   SODIUM mmol/L 144 146* 146*  < > 146*  < > 145   POTASSIUM mmol/L 4 6 4 9 4 6  < > 4 5  < > 4 1   CHLORIDE mmol/L 107 108 109*  < > 108  < > 109*   CO2 mmol/L 22 23 24  < > 24  < > 27   BUN mg/dL 57* 74* 71*  < > 68*  < > 62*   CREATININE mg/dL 2 93* 3 87* 3 85*  < > 3 66*  < > 3 27*   CALCIUM mg/dL 9 0 8 4 8 3  < > 8 4  < > 8 0*   TOTAL PROTEIN g/dL 5 7*  --   --   --  5 7*  --  5 2*   BILIRUBIN TOTAL mg/dL 3 40*  --   --   --  2 40*  --  1 40*   ALK PHOS U/L 187*  --   --   --  194*  --  197*   ALT U/L 127*  --   --   --  106*  --  111*   AST U/L 72*  --   --   --  23  --  22   GLUCOSE RANDOM mg/dL 108 119 135  < > 148*  < > 154*   GLUCOSE, ISTAT   --   --   --   --   --   < >  --    < > = values in this interval not displayed  Results from last 7 days  Lab Units 08/25/18  2211 08/25/18  0319 08/24/18  1938   MAGNESIUM mg/dL 2 3 2 2 2 2     Lab Results   Component Value Date    PHOS 5 1 (H) 08/26/2018    PHOS 6 9 (H) 08/25/2018    PHOS 4 8 (H) 08/19/2018        Results from last 7 days  Lab Units 08/26/18  0440 08/25/18  1045 08/24/18  2116   INR  2 82* 2 14* 3 00*   PTT seconds  --   --  47*       0  Lab Value Date/Time   TROPONINI 0 39 (H) 08/25/2018 0010   TROPONINI 0 38 (H) 08/24/2018 2116   TROPONINI 0 38 (H) 08/24/2018 1954   TROPONINI 0 77 (H) 08/19/2018 0345   TROPONINI 1 04 (H) 08/19/2018 0117   TROPONINI 1 12 (H) 08/18/2018 2200   TROPONINI 0 96 (H) 08/18/2018 1745         ABG:No results found for: PHART, SFZ1ZVL, PO2ART, ZWV3YVD, M3UKLZQB, BEART, SOURCE  Imaging: CXR S/P Dialysis access -  I have personally reviewed pertinent reports  and I have personally reviewed pertinent films in PACS  EKG: Telemetry monitor reviewed, noted prior to initiation of CVVH frequent PVCs, markedly decreased with initiation of therapy, continue to monitor  Micro:  Lab Results   Component Value Date    BLOODCX No Growth After 5 Days   08/20/2018    URINECX >100,000 cfu/ml Enterococcus faecalis (A) 08/18/2018     Allergies: No Known Allergies  Medications:   Scheduled Meds:  Current Facility-Administered Medications:  acetaminophen 650 mg Oral Q6H PRN Kelle Mo MD    albuterol 2 puff Inhalation Q4H PRN Kelle Mo MD    ampicillin 2,000 [facility]venous [facility] Last Rate: Stopped (08/25/18 7031) b complex-vitamin C-folic acid 1 capsule Oral Daily With BRENDA Covarrubias    bisacodyl 10 mg Rectal Daily PRN Jaylyn Norwood PA-C    docusate sodium 100 mg Oral BID Mari Chand MD    furosemide 10 mg/hr Intravenous Continuous Ting Metzger PA-C Last Rate: 10 mg/hr (08/25/18 1323)   insulin lispro 1-6 Units Subcutaneous Q6H [facility]    iron sucrose 300 mg Intravenous Daily Wesley Olivares DO Last Rate: Stopped (08/25/18 1801)   menthol-methyl salicylate  Apply externally 4x Daily PRN Vitaly Bartlett PA-C    metolazone 5 mg Oral Daily Ting Metzger PA-C    metoprolol tartrate 12 5 mg Oral Q12H [facility] Mari Chand MD    NxStage K 4/Ca 3 20,000 mL Dialysis Continuous eBv Ross MD    ondansetron 4 mg Intravenous Q6H PRN BETTY Pozo    pantoprazole 40 mg Intravenous Daily Ting Metzger PA-C    polyethylene glycol 17 g Oral Daily Mari Chand MD    prednisoLONE acetate 1 drop Left Eye 4x Daily Orquidea Zepeda MD      Continuous Infusions:  furosemide 10 mg/hr Last Rate: 10 mg/hr (08/25/18 1323)   NxStage K 4/Ca 3 20,000 mL      PRN Meds:    acetaminophen 650 mg Q6H PRN   albuterol 2 puff Q4H PRN   bisacodyl 10 mg Daily PRN   menthol-methyl salicylate  4x Daily PRN   ondansetron 4 mg Q6H PRN     VTE Pharmacologic Prophylaxis: Pharmacologic VTE Prophylaxis contraindicated due to Coagulopathic  VTE Mechanical Prophylaxis: sequential compression device  Invasive lines and devices: Invasive Devices     Peripherally Inserted Central Catheter Line            PICC Line 08/23/18 2 days          Central Venous Catheter Line            CVC Central Lines 08/25/18 Triple 16cm less than 1 day          Peripheral Intravenous Line            Peripheral IV 08/24/18 Right Forearm 1 day          Drain            Urethral Catheter Temperature probe 16 Fr  1 day                     Portions of the record may have been created with voice recognition software    Occasional wrong word or "sound a like" substitutions may have occurred due to the inherent limitations of voice recognition software  Read the chart carefully and recognize, using context, where substitutions have occurred      Lang Calderon PA-C

## 2018-08-26 NOTE — PROCEDURES
Central Line Insertion  Date/Time: 8/25/2018 9:58 PM  Performed by: Manjula Maloney  Authorized by: Manjula Maloney     Patient location:  Bedside  Other Assisting Provider: No    Consent:     Consent obtained:  Written and verbal    Consent given by:  Denny Lucia (daughter)    Risks discussed:  Arterial puncture, incorrect placement, infection, pneumothorax and bleeding    Alternatives discussed:  Delayed treatment and observation  Universal protocol:     Procedure explained and questions answered to patient or proxy's satisfaction: yes      Relevant documents present and verified: yes      Test results available and properly labeled: yes      Radiology Images displayed and confirmed  If images not available, report reviewed: yes      Required blood products, implants, devices, and special equipment available: yes      Site/side marked: yes      Immediately prior to procedure, a time out was called: yes      Patient identity confirmed:  Hospital-assigned identification number, arm band and provided demographic data  Pre-procedure details:     Hand hygiene: Hand hygiene performed prior to insertion      Sterile barrier technique: All elements of maximal sterile technique followed      Skin preparation:  2% chlorhexidine and ChloraPrep    Skin preparation agent: Skin preparation agent completely dried prior to procedure    Indications:     Central line indications: dialysis    Anesthesia (see MAR for exact dosages):      Anesthesia method:  Local infiltration    Local anesthetic:  Lidocaine 1% w/o epi  Procedure details:     Location:  Right internal jugular    Vessel type: vein      Laterality:  Right    Approach: percutaneous technique used      Patient position:  Flat    Catheter type:  Triple lumen 16cm    Catheter size:  12 Fr    Landmarks identified: yes      Ultrasound guidance: yes      Sterile ultrasound techniques: Sterile gel and sterile probe covers were used      Number of attempts:  1    Successful placement: yes      Vessel of catheter tip end:  16cm  Post-procedure details:     Post-procedure:  Dressing applied and line sutured    Assessment:  Blood return through all ports, no pneumothorax on x-ray, free fluid flow and placement verified by x-ray    Patient tolerance of procedure: Tolerated well, no immediate complications    Observer: Yes      Observer name:  Stephon Seaman

## 2018-08-27 NOTE — PROCEDURES
Intubation  Date/Time: 8/27/2018 12:00 AM  Performed by: Brielle Nguyen by: Flynn Nagel     Patient location:  Bedside  Other Assisting Provider: No    Consent:     Consent obtained:  Emergent situation  Universal protocol:     Procedure explained and questions answered to patient or proxy's satisfaction: no      Relevant documents present and verified: yes      Test results available and properly labeled: yes      Radiology Images displayed and confirmed  If images not available, report reviewed: yes      Required blood products, implants, devices, and special equipment available: yes      Site/side marked: no      Immediately prior to procedure, a time out was called: yes      Patient identity confirmed:  Arm band, provided demographic data and hospital-assigned identification number  Pre-procedure details:     Patient status:  Unresponsive    Mallampati score:  1    Pretreatment medications:  Etomidate    Paralytics:  None  Indications:     Indications for intubation: respiratory failure, airway protection and hypoxemia    Procedure details:     Preoxygenation:  Bag valve mask    CPR in progress: no (BVM during event, after ROSC patient intubated)      Intubation method:  Oral    Oral intubation technique:  Glidescope (Tebla)    Laryngoscope blade: Mac 4    Tube size (mm):  8 0    Tube type:  Cuffed    Number of attempts:  1    Cricoid pressure: no      Tube visualized through cords: yes    Placement assessment:     Tube secured with:  ETT payan    Breath sounds:  Equal and absent over the epigastrium    Placement verification: chest rise, CXR verification, equal breath sounds and ETCO2 detector      CXR findings:  ETT in proper place  Post-procedure details:     Patient tolerance of procedure:   Tolerated well, no immediate complications

## 2018-08-27 NOTE — PROGRESS NOTES
Critical Care Interval Progress Note     Anastasia Pitts 70 y o  female MRN: [id number]    Unit/Bed#:  Encounter: [id number]    Impression:    1  Acute Toxic metabolic encephalopathy  2  Acute Kidney injury with Volume overload  3  Hypotension     Plan:    - currently running -150 on CVVH  - will initiate levophed and titrate to a goal MAP >65mm/hg, administer 250g 25% Albumin now in attempt to avoid vasopressor support or minimize duration as patient net -2 7 since admission  - discussed with Nephrology, Dr Recinos Done ok discontinuing CVVH at this time, return the blood and will continue to reevaluate over the next several hours with the thought of continuation tomorrow with iHD  Counseling / Coordination of Care: Total time spent today 20 minutes  Greater than 50% of total time was spent with the patient and / or family counseling and / or coordination of care  A description of the counseling / coordination of care: review of EMR, development of care and treatment plan, discussion with consultant services  ______________________________________________________________________    Chief Complaint:   None offered    Recent Events / Nursing Concern:   Notified by primary RN, patient currently on CVVH running -150  Patient now hypotensive with systolic BP in 96V with MAP 55      Vitals:   Vitals:    18 1815 18 1830 18 1845 18 1922   BP: (!) 89/53 108/55 93/50 114/52   Pulse: (!) 107 105 105 (!) 108   Resp: 15 15 14 16   Temp: (!) 95 7 °F (35 4 °C) (!) 95 7 °F (35 4 °C) (!) 95 5 °F (35 3 °C) (!) 95 7 °F (35 4 °C)   TempSrc:    Probe   SpO2: 99% 99% 100% 99%   Weight:       Height:                 Temperature: Temp (24hrs), Av 8 °F (36 °C), Min:95 °F (35 °C), Max:98 1 °F (36 7 °C)  Current: Temperature: (!) 95 7 °F (35 4 °C)    Hemodynamic Monitoring:  N/A       Respiratory:  SpO2: SpO2: 99 %  O2 Flow Rate (L/min): 2 L/min    Physical Exam:  Physical Exam     General: 69 y/o F essentially no change in mental status from earlier, occasional moan, not following commands  HEENT: Normocephalic, atraumatic   Pupils 3mm sluggist, oropharnx patent, membranes moist  Neck: Supple, trachea midline, JVD, RIJ dialysis catheter in place  Heart: irregular rate and rhythm, apically tachycardic, no murmur or rub  Lungs: course breath sounds with rales   Abd: soft, no grimace to palpation  : holt in place  Ext: distal pulses intact  Neuro: non-focal    Allergies: No Known Allergies    Medications:   Scheduled Meds:  Current Facility-Administered Medications:  acetaminophen 650 mg Rectal Q4H PRN Enrique Herring PA-C    albuterol 2 puff Inhalation Q4H PRN Kayden Bryant MD    bisacodyl 10 mg Rectal Daily PRN Jaylyn Norwood PA-C    calcium gluconate 1 G  100 mL IVPB 1 g Intravenous Once Christy Hills MD Last Rate: 1 g (08/26/18 1822)   insulin lispro 1-6 Units Subcutaneous Q6H [address] Ashley Payton PA-C    iron sucrose 300 mg Intravenous Daily Princella Baylee, DO Last Rate: 300 mg (08/26/18 1010)   menthol-methyl salicylate  Apply externally 4x Daily PRN Vitaly Diaz PA-C    metoprolol 5 mg Intravenous Q6H PRN Enrique Herring PA-C    norepinephrine 1-30 mcg/min Intravenous Titrated Bebeto Vásquez PA-C    NxStage K 4/Ca 3 20,000 mL Dialysis Continuous Christy Hills MD    ondansetron 4 mg Intravenous Q6H PRN BETTY Pozo    pantoprazole 40 mg Intravenous Daily Ashley Payton PA-C    prednisoLONE acetate 1 drop Left Eye 4x Daily Kayden Bryant MD      Continuous Infusions:  norepinephrine 1-30 mcg/min   NxStage K 4/Ca 3 20,000 mL     PRN Meds:    acetaminophen 650 mg Q4H PRN   albuterol 2 puff Q4H PRN   bisacodyl 10 mg Daily PRN   menthol-methyl salicylate  4x Daily PRN   metoprolol 5 mg Q6H PRN   ondansetron 4 mg Q6H PRN       Labs:     Results from last 7 days  Lab Units 08/26/18  1557 08/26/18  1039 08/26/18  0440  08/25/18  0319  08/24/18  2116   WBC Thousand/uL  --   -- 15 89*  --  10 82*  --  10 15   HEMOGLOBIN g/dL 8 9* 8 4* 9 0*  < > 8 0*  < > 6 2*   HEMATOCRIT %  --   --  30 2*  --  27 4*  --  21 8*   PLATELETS Thousands/uL  --   --  60*  --  108*  --  101*  101*   NEUTROS PCT %  --   --  84*  --  84*  --  72   MONOS PCT %  --   --  9  --  6  --  10   < > = values in this interval not displayed  Results from last 7 days  Lab Units 08/26/18  1557 08/26/18  1039 08/26/18  0440  08/25/18  0900  08/24/18  1938   SODIUM mmol/L 144 143 144  < > 146*  < > 145   POTASSIUM mmol/L 4 7 4 5 4 6  < > 4 5  < > 4 1   CHLORIDE mmol/L 107 108 107  < > 108  < > 109*   CO2 mmol/L 26 26 22  < > 24  < > 27   BUN mg/dL 46* 52* 57*  < > 68*  < > 62*   CREATININE mg/dL 2 41* 2 68* 2 93*  < > 3 66*  < > 3 27*   CALCIUM mg/dL 8 9 8 6 9 0  < > 8 4  < > 8 0*   TOTAL PROTEIN g/dL  --   --  5 7*  --  5 7*  --  5 2*   BILIRUBIN TOTAL mg/dL  --   --  3 40*  --  2 40*  --  1 40*   ALK PHOS U/L  --   --  187*  --  194*  --  197*   ALT U/L  --   --  127*  --  106*  --  111*   AST U/L  --   --  72*  --  23  --  22   GLUCOSE RANDOM mg/dL 103 104 108  < > 148*  < > 154*   GLUCOSE, ISTAT   --   --   --   --   --   < >  --    < > = values in this interval not displayed      Results from last 7 days  Lab Units 08/26/18  1557 08/26/18  1039 08/26/18  0440   MAGNESIUM mg/dL 2 0 2 0 2 1       Results from last 7 days  Lab Units 08/26/18  1557 08/26/18  1039 08/26/18  0440   PHOSPHORUS mg/dL 4 0 4 4* 5 1*        Results from last 7 days  Lab Units 08/26/18  0440 08/25/18  1045 08/24/18  2116   INR  2 82* 2 14* 3 00*   PTT seconds  --   --  47*       Results from last 7 days  Lab Units 08/26/18  0452   LACTIC ACID mmol/L 2 6*       0  Lab Value Date/Time   TROPONINI 0 39 (H) 08/25/2018 0010   TROPONINI 0 38 (H) 08/24/2018 2116   TROPONINI 0 38 (H) 08/24/2018 1954   TROPONINI 0 77 (H) 08/19/2018 0345   TROPONINI 1 04 (H) 08/19/2018 0117   TROPONINI 1 12 (H) 08/18/2018 2200   TROPONINI 0 96 (H) 08/18/2018 1745 Diagnostic Imaging / Data: I have personally reviewed pertinent reports  EKG: Telemetry monitor reviewed not RBBB / tachycardia  Code Status: Level 1 - Full Code    Portions of the record may have been created with voice recognition software  Occasional wrong word or "sound a like" substitutions may have occurred due to the inherent limitations of voice recognition software  Read the chart carefully and recognize, using context, where substitutions have occurred      SIGNATURE: Virgina Habermann, PA-C  DATE: August 26, 2018  TIME: 8:18 PM

## 2018-08-27 NOTE — DISCHARGE SUMMARY
Death / Discharge Summary - Himasnhu Comment 70 y o  female MRN: [id number]    Unit/Bed#:  Encounter: [id number] PCP: No primary care provider on file  Admission Date:   Admission Orders     Ordered        08/18/18 2025  Inpatient Admission (expected length of stay for this patient is greater than two midnights)  Once         08/18/18 1832  Inpatient Admission (expected length of stay for this patient is greater than two midnights)  Once               Admitting Diagnosis: Leg pain [M79 606]  Altered mental status [R41 82]  Hypoxia [R09 02]  Acute renal failure (ARF) (HCC) [N17 9]  Supratherapeutic INR [R79 1]  Elevated troponin I level [R74 8]    HPI:   Himanshu Loyd is a 70 y o  female who presents with altered mental status and difficulty breathing  She is unable to give me a history secondary to altered mental status, and her daughter is at bedside and is attempting to give a collateral history      Unfortunately the daughter is a poor historian as well  As per the daughter the patient was recently at a hospital in [place] where she was admitted for fall  The patient has visible bruising on the right side of her face to evidence this  She was discharged from this hospital on Thursday and was staying with her daughter in [place], up until her other daughter who lives here and [facility], who is currently here at bedside, drove to pick her up and bring her back to [facility]      As as per the daughter was at bedside, the patient had been started on 3 new heart medications prior to her fall, and had a heart rate in the 30s when she was admitted to hospital last week      The patient is apparently alert and oriented x3 with only mild short-term memory issues at baseline, however when she was discharged from hospital on Thursday, she was not herself as per her daughter    As per the patient's daughter she drove up from [place] today with her mother with the intent to have her stay with her going forward, but noted that her mother was becoming increasingly short of breath and more confused in the car, so she brought her into the emergency department here at [address]      The patient does have a history of heart failure, and does appear volume overloaded at bedside with elevated JVP bilateral pitting edema on both lower extremities and some vascular congestion seen on chest x-ray  BNP is 118,000 troponin is elevated 0 96      In addition LFTs are elevated in INR is elevated as well         The patient's daughter states that she has 1 kidney, and her baseline creatinine is unknown, today creatinine is 2 7  Procedures Performed:   Orders Placed This Encounter   Procedures   [facility]    ED ECG Documentation Only    Intubation       Summary of Hospital Course:   Patient throughout her admission on 8/18 was additionally seen and evaluated by Nephrology as well as Cardiology  She remained encephalopathic throughout her admission with worsening renal failure as well as a NSTEMI which was being medically managed  Nephrology continued to manage patients worsening renal failure which eventually became associated with worsening encephalopathy with associated metabolic acidosis  Patient despite aggressive treatment measures continued to decline with the developed Atrial Fibrillation with a known history for which she was on Eliquis  Patient transferred to the [facility] service on 8/25 with the following summary:    Patient transferred to [place] [address] and was upgraded to ICU  In review of EMR as well as discussion with daughter and other service lines  Patient is a 68year old female whom was initially admitted on 8/18 after presenting with shortness of breath and reported change in mental status  Patient has an extensive PMH of HTN, DM, HLD, CAD s/p CABG as well as CHF  Daughter reports that the patient had a recent hospital admission in [place] after an apparent fall   Daughter further reported patient believed to become bradycardic which lead patient to fall  Her medication were adjusted during that hospital stay and the patient was discharged  Daughter was planning to bring her mother to live in [place]  While on their drive, patient became more short of breath with again change in mental status  On admission patient was noted to have a Cr of 2 7  Since admission we have been able to obtain out of hospital records finding that patients Cr in July was 1 5; however upon d/c for [facility] it was 1 79  Nephrology and cardiology have been following the patient throughout her admission; however now worsening renal failure with volume overload  Attempts to diurese have been unsuccessful and renal function continues to worsen  This evening there was concern was patient becoming more lethargic and appeared short of breath  Patient was transferred to the [facility] unit and soon after upgraded to ICU due to acute findings and further necessary treatment modalities  Upon transfer to ICU, decision was made to being CVVH, RIJ dialysis catheter was placed  Around the same time, with the worsening encephalopathy, associated coagulopathy and now a 2g drop in Hgb, CT scans of the Head, Chest, Abdomen and pelvis were performed  A spontaneous retroperitoneal hematoma was found on imaging  Although patient was coagulopathic, findings were discussed with Dr Bianka Bates with IR, it was felt Risks were more prevalent than the possible benefit and ability to intervene  We believed further attempts at reversing the coagulopathy could stop the bleeding  Patient was transfused PRBCs as well as administered [address], and FFP  CRRT also initiated  Patient continued on CRRT with worsening coagulopathy  On the evening of 8/26 patient became hypotensive and started of pressor support with additional labwork performed    While completing further workup, noted patient again with drop in hemoglobin and worsening acidosis associated with worsening coagulopathy  Patient sustained cardiac arrest approximately 2340 hours  Review of telemetry appeared as though she developed sudden worsening hypotension with bradycardia to PEA arrest   Code blue called, see further code documentation; however ROSC after approximately 6mins  Patient remained profoundly acidotic  Repeat labs revealed worsening coagulopathy, anemia and Additional blood products were requested as Hgb 7 with Plts <40  EKG changes were noted particularly noting significant deep T wave inversions  In lieu of the coagulopathy with this finding, concerned about not only a spontaneous head bleed, but also rebleed within the retroperitoneum  Additional CTs requested of Head as well as Abdomen Pelvis  Patient was prepared for transfer to CT and daughter advised of event and requested to come into hospital   Prior to transfer to CT, patient again sudden bradycardia with progression to PEA arrest   Patient was coded and I was assisted by ER attending Dr Avery Mcnulty while I further discussed with family goals of care and overall poor prognosis  Daughter and family requested to be in room while 2nd resuscitation attempts were underway  Daughter when at bedside advised that she would like all resuscitation attempts terminated  Patient at that time was noted to be PEA, she was provided Morphine for some distress as well as Ativan and passed a few moments later      Significant Findings, Care, Treatment and Services Provided:   See Above as well as Code documentation    Complications:   See above as well as Code documentation    Disposition:      Final Diagnosis:   PEA Arrest  Acute hypoxic respiratory failure  Coagulopathy secondary to Eliquis  Suspected DIC vs TTP  Retroperitoneal Hematoma    Resolved Problems  Date Reviewed: 2018          Resolved    Iron deficiency anemia 2018     Resolved by  Jacque Lock PA-C    Low bicarbonate level 2018 Resolved by  Fouzia Sarmiento PA-C          Date, Time and Cause of Death    Date of Death:  8/27/18  Time of Death:   1:35 AM  Preliminary Cause of Death:  Acute renal failure ([facility] [address] )  Entered by:  Greta Hill[KS1 1]     Attribution     KS1 1 Fouzia Sarmiento PA-C 08/27/18 01:41          ** At request of Dr Delfino Queen [facility] attending on call at time of event, please assign Death Certificate to [facility] Attending Dr Mistry Kidney  **

## 2018-08-27 NOTE — PROGRESS NOTES
CODE BLUE / Progress Note - [address] 70 y o  female MRN: [id number]  Unit/Bed#:  Encounter: [id number]    Attending Physician: Robin Avila MD      ______________________________________________________________________  Assessment and Plan:     1  PEA Arrest with ROSC  2  Acute Hypoxic Respiratory Failure  3  Anion Gap Metabolic Acidosis  4  Coagulopathy secondary to eliquis   5  Possible TTP vs DIC  6  Thrombocytopenia  7  Acute blood loss anemia   8  Hyperkalemia  9  Transaminitis  10  Acute / chronic systolic / diastolic CHF      Neuro: continue to monitor mental status, obtain STAT Head CT as concerned for possible hemorrhage as noted deep inverted T waves diffusely    CV: review of telemetry prior to PEA arrest revealed bradycardia associated with hypotension as RN titrating up on Levophed to achieve goal MAP >65mm/hg  See code narrator documentation; however PEA arrest immediate CPR as well as BVM ventilation initiated  Patient provided Epi and Atropine and Sodium bicarb  Patient coded for approximately 6 mins  Upon ROSC, patient initially tachycardic which progressed to Wide complex tachycardia with pulses  Prepared patient for Cardioversion, Sync cardioversion performed at 200J with conversion to Sinus rate 80 with noted deep inverted T waves  Pulm: continue SpO2 monitoring to maintain >92%  Through PEA arrest, patient easy to ventilate with BVM  Upon ROSC, patient remained unresponsive and she was intubated at that time with 8 0 ETT on first attempt  Continue mechanical ventilatory support, repeat ABG 1H    GI: NPO, will perform STAT non contract CT of Abd/Pelvis as concerned for recurrence of spontaneous retroperitoneal bleeding      : maintain holt with strict I/O, nursing attempting to re-establish CVVH as machine taken down earlier per Nephrology when patient became hypotensive; however decision made to place back on CVVH after repeat labs revealing worsening metabolic acidosis    F/E/N: replete and monitor lytes prn    ID: continue to monitor temps as well as WBC count    Heme: as patient with retroperitoneal hematoma previously diagnosed on CT Scan, patient was previously provided KCentra, PRBCs as well as FFP  ISTAT concerning for decreased Hgb raising suspicion for further active bleeding or even more concerning for possible TTP or now even DIC  Repeat Labs requested noting Plt count of 33, STAT order for 2U of platelets as well as 1U PRBCs  Fibrinogen ordered as was a repeat INR as I have high suspicion for active bleeding possibly in Head as well as again within her Retroperitoneum  Prepared to be more aggressive with blood products including both FFP as well as Cryo  Endo: continue to monitor accu-checks     Msk/Skin: reposition and turn q2h, monitor for breakdown    Disposition: maintain ICU level of care  Contacted Lyssa -patients daughter upon ROSC, updated and advised to come to hospital   Discussed case and entire resuscitation with on call [facility] Attending, Dr Mika Verde,  He is in agreement with the Assessment and plan as outlined above  Additionally updated Dr Harpal Perez with Nephrology of event  Code Status: Level 1 - Full Code    Counseling / Coordination of Care  Total time spent today 50 minutes  Greater than 50% of total time was spent with the patient and / or family counseling and / or coordination of care  A description of the counseling / coordination of care: providing direct bedside management post PEA arrest, discussing with attending and consultant services as well as considering etiologies regarding patient decompensation   Total Critical Care time 50 mins over and above Code Blue Event  ______________________________________________________________________    Chief Complaint: None Offered    24 Hour Events:   Sudden onset of worsening hypotension and bradycardia progressing to PEA Arrest    Review of Systems   Unable to perform ROS: Patient unresponsive     ______________________________________________________________________    Physical Exam:     General: 69 y/o F acutely ill and toxic appearing post cardiac arrest   Noted ecchymosis about forehead and cheeks bilaterally  HEENT: Normocephalic, no obvious trauma; however noted ecchymosis about face / forehead, pupils equal at 3mm, sclera anicteric, oropharynx dry, ETT and OG in place  Neck: Trachea midline, with again noted oozing blood from R temporary dialysis site  Heart: irregular rate and rhythm  Lungs: mechanical breath sounds  Abd: soft  Ext: very weak distal pulses  Neuro: GCS 3T  Skin: pale with noted areas of ecchymosis about forehead, forearms  ______________________________________________________________________  Vitals:    18 2230 18 2240 18 2309 18 2327   BP: 99/58 (!) 84/54 98/54 (!) 76/28   BP Location:    Left arm   Pulse: (!) 115 (!) 116 (!) 108 (!) 116   Resp:    Temp: (!) 95 7 °F (35 4 °C) (!) 95 7 °F (35 4 °C) (!) 95 9 °F (35 5 °C) (!) 96 3 °F (35 7 °C)   TempSrc:    Probe   SpO2: 97% 98% 96% 97%   Weight:       Height:           Temperature:   Temp (24hrs), Av 6 °F (35 9 °C), Min:95 °F (35 °C), Max:98 1 °F (36 7 °C)    Current Temperature: (!) 96 3 °F (35 7 °C)  Weights:   IBW: 54 7 kg    Body mass index is 27 74 kg/m²    Weight (last 2 days)     Date/Time   Weight    18 0600  73 3 (161 6)    18 2113  74 (163 14)    18 0831  72 (158 73)    18 0600  74 7 (164 68)            Hemodynamic Monitoring:  N/A     Non-Invasive/Invasive Ventilation Settings:  Respiratory    Lab Data (Last 4 hours)    None         O2/Vent Data (Last 4 hours)       0000           Vent Mode AC/VC       Resp Rate (BPM) (BPM) 15       Vt (mL) (mL) 410       FIO2 (%) (%) 100       PEEP (cmH2O) (cmH2O) 10       MV 6 95                 No results found for: PHART, WUV9XGT, PO2ART, LNY3BMH, S4NCCLHV, BEART, SOURCE  SpO2: SpO2: 96 %  Intake and Outputs:  I/O       08/25 0701 - 08/26 0700 08/26 0701 - 08/27 0700    I V  (mL/kg) 145 6 (2) 16 (0 2)    Blood 500 349    IV Piggyback 855 7 667    Total Intake(mL/kg) 1501 3 (20 5) 1032 (14 1)    Urine (mL/kg/hr) 14 (0) 12 (0)    Other 913 2014    Total Output 927 2026    Net +574 3 -994              Placing back on CVVH - anuric  Nutrition:        Diet Orders            Start     Ordered    08/24/18 2116  Diet NPO  Diet effective now     Comments:  Acute encephalopathy   Question Answer Comment   Diet Type NPO    RD to adjust diet per protocol? Yes        08/24/18 2116 08/22/18 1506  Dietary nutrition supplements  Once     Question Answer Comment   Select Supplement: Ensure Compact-Vanilla    Frequency Breakfast, Lunch        08/22/18 1505    08/18/18 2139  Room Service  Once     Question:  Type of Service  Answer:  Room Service - Appropriate with Assistance    08/18/18 2138          Labs:     Results from last 7 days  Lab Units 08/27/18  0015 08/27/18  0001 08/26/18 2121 08/26/18  0440  08/25/18 0319  08/24/18 2116   WBC Thousand/uL  --  14 33*  --   --  15 89*  --  10 82*  --  10 15   HEMOGLOBIN g/dL  --  7 1* 8 3*  < > 9 0*  < > 8 0*  < > 6 2*   I STAT HEMOGLOBIN g/dl 7 5*  --   --   --   --   --   --   --   --    HEMATOCRIT % 22* 25 0*  --   --  30 2*  --  27 4*  --  21 8*   PLATELETS Thousands/uL  --  33*  --   --  60*  --  108*  --  101*  101*   NEUTROS PCT %  --   --   --   --  84*  --  84*  --  72   MONOS PCT %  --   --   --   --  9  --  6  --  10   MONO PCT MAN %  --  9  --   --   --   --   --   --   --    < > = values in this interval not displayed      Results from last 7 days  Lab Units 08/27/18  0015 08/27/18  0001 08/26/18 2121 08/26/18  1557  08/26/18  0440  08/25/18  0900   SODIUM mmol/L  --  145 143 144  < > 144  < > 146*   POTASSIUM mmol/L  --  6 0* 4 9 4 7  < > 4 6  < > 4 5   CHLORIDE mmol/L  --  106 105 107  < > 107  < > 108   CO2 mmol/L  --  23 20* 26  < > 22  < > 24 BUN mg/dL  --  41* 42* 46*  < > 57*  < > 68*   CREATININE mg/dL  --  2 54* 2 39* 2 41*  < > 2 93*  < > 3 66*   CALCIUM mg/dL  --  8 0* 9 4 8 9  < > 9 0  < > 8 4   TOTAL PROTEIN g/dL  --  4 7*  --   --   --  5 7*  --  5 7*   BILIRUBIN TOTAL mg/dL  --  3 10*  --   --   --  3 40*  --  2 40*   ALK PHOS U/L  --  145*  --   --   --  187*  --  194*   ALT U/L  --  161*  --   --   --  127*  --  106*   AST U/L  --  265*  --   --   --  72*  --  23   GLUCOSE RANDOM mg/dL  --  431* 63* 103  < > 108  < > 148*   GLUCOSE, ISTAT mg/dl 160*  --   --   --   --   --   --   --    < > = values in this interval not displayed  Results from last 7 days  Lab Units 08/27/18  0001 08/26/18  2121 08/26/18  1557   MAGNESIUM mg/dL 2 0 2 0 2 0     Lab Results   Component Value Date    PHOS 6 2 (H) 08/27/2018    PHOS 4 6 (H) 08/26/2018    PHOS 4 0 08/26/2018        Results from last 7 days  Lab Units 08/27/18  0001 08/26/18  0440 08/25/18  1045 08/24/18  2116   INR   --  2 82* 2 14* 3 00*   PTT seconds 47*  --   --  47*       0  Lab Value Date/Time   TROPONINI 0 39 (H) 08/25/2018 0010   TROPONINI 0 38 (H) 08/24/2018 2116   TROPONINI 0 38 (H) 08/24/2018 1954   TROPONINI 0 77 (H) 08/19/2018 0345   TROPONINI 1 04 (H) 08/19/2018 0117   TROPONINI 1 12 (H) 08/18/2018 2200   TROPONINI 0 96 (H) 08/18/2018 1745       Results from last 7 days  Lab Units 08/26/18  0452   LACTIC ACID mmol/L 2 6*     ABG:No results found for: PHART, DAO7RYS, PO2ART, EBC0JLY, B6UDECIW, BEART, SOURCE  Imaging: CXR reviewed post intubation, successful placement of ETT approximately 2CM above richie I have personally reviewed pertinent reports  and I have personally reviewed pertinent films in PACS  EKG: Telemetry events reviewed noting Sinus Bradycardia, to PEA bradycardia, to VTach with ROSC, Cardioversion perform  Micro:  Lab Results   Component Value Date    BLOODCX No Growth After 5 Days   08/20/2018    URINECX >100,000 cfu/ml Enterococcus faecalis (A) 08/18/2018 Allergies: No Known Allergies  Medications:   Scheduled Meds:  Current Facility-Administered Medications:  acetaminophen 650 mg Rectal Q4H PRN Gaye Ledesma PA-C    albuterol 2 puff Inhalation Q4H PRN Yong Mckeon MD    bisacodyl 10 mg Rectal Daily PRN Jaylyn Norwood PA-C    insulin lispro 1-6 Units Subcutaneous Q6H [address] Ashley Payton PA-C    iron sucrose 300 mg Intravenous Daily [place] Eye, DO Last Rate: Stopped (08/26/18 2000)   menthol-methyl salicylate  Apply externally 4x Daily PRN Vitaly Clokhalif Beckwith PA-C    metoprolol 5 mg Intravenous Q6H PRN Gaye Ledesma PA-C    norepinephrine 1-30 mcg/min Intravenous Titrated Asia Delgado PA-C Last Rate: 4 mcg/min (08/26/18 2102)   NxStage K 4/Ca 3 20,000 mL Dialysis Continuous Petey Good MD    ondansetron 4 mg Intravenous Q6H PRN BETTY Pozo    pantoprazole 40 mg Intravenous Daily Ashley Manuel PA-C    prednisoLONE acetate 1 drop Left Eye 4x Daily Yong Mckeon MD    sodium bicarbonate 50 mEq Intravenous Once Cathharris Delgado PA-C    sodium bicarbonate 50 mEq Intravenous Once Asia Delgado PA-C      Continuous Infusions:  norepinephrine 1-30 mcg/min Last Rate: 4 mcg/min (08/26/18 2102)   NxStage K 4/Ca 3 20,000 mL      PRN Meds:    acetaminophen 650 mg Q4H PRN   albuterol 2 puff Q4H PRN   bisacodyl 10 mg Daily PRN   menthol-methyl salicylate  4x Daily PRN   metoprolol 5 mg Q6H PRN   ondansetron 4 mg Q6H PRN     VTE Pharmacologic Prophylaxis: Pharmacologic VTE Prophylaxis contraindicated due to coagulopathic  VTE Mechanical Prophylaxis: sequential compression device  Invasive lines and devices:   Invasive Devices     Peripherally Inserted Central Catheter Line            PICC Line 08/23/18 3 days          Central Venous Catheter Line            CVC Central Lines 08/25/18 Triple 16cm 1 day          Peripheral Intravenous Line            Peripheral IV 08/24/18 Right Forearm 2 days          Drain            Urethral Catheter Temperature probe 16 Fr  2 days                     Portions of the record may have been created with voice recognition software  Occasional wrong word or "sound a like" substitutions may have occurred due to the inherent limitations of voice recognition software  Read the chart carefully and recognize, using context, where substitutions have occurred      Faviola Corona PA-C

## 2018-08-27 NOTE — TREATMENT PLAN
Patient CVVH was held earlier due to worsening hemodynamics  The plan was to transition to intermittent hemodialysis and the patient may have reached the ultrafiltration that was tolerated  Then the follow-up blood work shows worsening anion gap and PH  And therefore we will restart the patient on CVVH without ultrafiltration for rest of the night and reassess  Patient is currently on low-dose Levophed

## 2018-08-27 NOTE — TREATMENT PLAN
Overnight events noted  Case was reviewed with the advanced practitioner last night and also this morning  There is concern of catastrophic bleeding versus coagulopathy of unclear etiology  Patient had cardiac arrest and was  early this morning  I spoke with the daughter to answer any questions  Asked the daughter to call if they have any questions at any time  Thank you for allowing our team to participate in the care of your patient in this unfortunate Outcome

## 2018-08-27 NOTE — PROGRESS NOTES
Critical Care Interval Progress Note     Stephanie Alcaraz 70 y o  female MRN: [id number]    Unit/Bed#:  Encounter: [id number]    Impression:    1  Acute toxic metabolic encephalopathy  2  Anion Gap Metabolic Acidosis  3  Acute Kidney injury   4  Hypotension - Shock vs volume depletion    Plan:  -  Since stopping CVVH as previously noted, repeat labs reveal a worsening anion gap metabolic acidosis  Nephrology in the ICU and case discussed, will restart on CVVH now without UF   -  Continue levophed, titrate to a goal MAP >65mm/hg      Counseling / Coordination of Care: Total time spent today 20 minutes  Greater than 50% of total time was spent with the patient and / or family counseling and / or coordination of care  A description of the counseling / coordination of care: adjustment to care and treatment plan  ______________________________________________________________________    Chief Complaint: None offered    Recent Events / Nursing Concern:   Advised of repeat labs, VBG revealing anion gap metabolic acidosis  Patient remains on Levophed  Provided 250g 25% albumin without significant response  Vitals:   Vitals:    18 2153 18 2215 18 2230 18 2240   BP: (!) 88/57 91/53 99/58 (!) 84/54   Pulse: (!) 118 (!) 117 (!) 115 (!) 116   Resp: 20 20 21 20   Temp: (!) 95 5 °F (35 3 °C) (!) 95 7 °F (35 4 °C) (!) 95 7 °F (35 4 °C) (!) 95 7 °F (35 4 °C)   TempSrc:       SpO2: 97% 97% 97% 98%   Weight:       Height:                 Temperature: Temp (24hrs), Av 6 °F (35 9 °C), Min:95 °F (35 °C), Max:98 1 °F (36 7 °C)  Current: Temperature: (!) 95 7 °F (35 4 °C)    Hemodynamic Monitoring:  N/A       Respiratory:  SpO2: SpO2: 98 %  O2 Flow Rate (L/min): 2 L/min    Physical Exam:  Physical Exam    General: 69 y/o F no change in mental status since prior note  HEENT: normocephalic, atraumatic   Pupils 3mm, oropharynx patent, membranes moist  Neck: supple, trachea midline, JVD, RIJ catheter in place  Heart: irregular rate and rhythm, apically tachycardic  Lungs: course breath sounds  Abd: Soft  : holt  Ext: distal pulses intact  Neuro: difficult to assess, non-focal    Allergies: No Known Allergies    Medications:   Scheduled Meds:  Current Facility-Administered Medications:  acetaminophen 650 mg Rectal Q4H PRN Marchelle Barthel, PA-C    albuterol 2 puff Inhalation Q4H PRN Riley Wilkes MD    bisacodyl 10 mg Rectal Daily PRN Jaylyn Norwood PA-C    insulin lispro 1-6 Units Subcutaneous Q6H [facility] Ashley Payton PA-C    iron sucrose 300 mg Intravenous Daily Priscila Vasquez,  Last Rate: Stopped (08/26/18 2000)   menthol-methyl salicylate  Apply externally 4x Daily PRN Shawn Alfredia Holter, PA-C    metoprolol 5 mg Intravenous Q6H PRN Marchelle Barthel, PA-C    norepinephrine 1-30 mcg/min Intravenous Titrated Cristal Serrato PA-C Last Rate: 4 mcg/min (08/26/18 2102)   NxStage K 4/Ca 3 20,000 mL Dialysis Continuous Kelsea Martin MD    ondansetron 4 mg Intravenous Q6H PRN BETTY Pozo    pantoprazole 40 mg Intravenous Daily Marchelle Barthel, PA-C    prednisoLONE acetate 1 drop Left Eye 4x Daily Riley Wilkes MD      Continuous Infusions:  norepinephrine 1-30 mcg/min Last Rate: 4 mcg/min (08/26/18 2102)   NxStage K 4/Ca 3 20,000 mL      PRN Meds:    acetaminophen 650 mg Q4H PRN   albuterol 2 puff Q4H PRN   bisacodyl 10 mg Daily PRN   menthol-methyl salicylate  4x Daily PRN   metoprolol 5 mg Q6H PRN   ondansetron 4 mg Q6H PRN       Labs:     Results from last 7 days  Lab Units 08/26/18  2121 08/26/18  1557 08/26/18  1039 08/26/18  0440  08/25/18  0319  08/24/18  2116   WBC Thousand/uL  --   --   --  15 89*  --  10 82*  --  10 15   HEMOGLOBIN g/dL 8 3* 8 9* 8 4* 9 0*  < > 8 0*  < > 6 2*   HEMATOCRIT %  --   --   --  30 2*  --  27 4*  --  21 8*   PLATELETS Thousands/uL  --   --   --  60*  --  108*  --  101*  101*   NEUTROS PCT %  --   --   --  84*  --  84*  --  72   MONOS PCT %  --   --   --  9  --  6 --  10   < > = values in this interval not displayed  Results from last 7 days  Lab Units 08/26/18 2121 08/26/18  1557 08/26/18  1039 08/26/18  0440  08/25/18  0900  08/24/18  1938   SODIUM mmol/L 143 144 143 144  < > 146*  < > 145   POTASSIUM mmol/L 4 9 4 7 4 5 4 6  < > 4 5  < > 4 1   CHLORIDE mmol/L 105 107 108 107  < > 108  < > 109*   CO2 mmol/L 20* 26 26 22  < > 24  < > 27   BUN mg/dL 42* 46* 52* 57*  < > 68*  < > 62*   CREATININE mg/dL 2 39* 2 41* 2 68* 2 93*  < > 3 66*  < > 3 27*   CALCIUM mg/dL 9 4 8 9 8 6 9 0  < > 8 4  < > 8 0*   TOTAL PROTEIN g/dL  --   --   --  5 7*  --  5 7*  --  5 2*   BILIRUBIN TOTAL mg/dL  --   --   --  3 40*  --  2 40*  --  1 40*   ALK PHOS U/L  --   --   --  187*  --  194*  --  197*   ALT U/L  --   --   --  127*  --  106*  --  111*   AST U/L  --   --   --  72*  --  23  --  22   GLUCOSE RANDOM mg/dL 63* 103 104 108  < > 148*  < > 154*   GLUCOSE, ISTAT   --   --   --   --   --   --   < >  --    < > = values in this interval not displayed  Results from last 7 days  Lab Units 08/26/18 2121 08/26/18  1557 08/26/18  1039   MAGNESIUM mg/dL 2 0 2 0 2 0       Results from last 7 days  Lab Units 08/26/18 2121 08/26/18  1557 08/26/18  1039   PHOSPHORUS mg/dL 4 6* 4 0 4 4*        Results from last 7 days  Lab Units 08/26/18 0440 08/25/18  1045 08/24/18  2116   INR  2 82* 2 14* 3 00*   PTT seconds  --   --  47*       Results from last 7 days  Lab Units 08/26/18  0452   LACTIC ACID mmol/L 2 6*       0  Lab Value Date/Time   TROPONINI 0 39 (H) 08/25/2018 0010   TROPONINI 0 38 (H) 08/24/2018 2116   TROPONINI 0 38 (H) 08/24/2018 1954   TROPONINI 0 77 (H) 08/19/2018 0345   TROPONINI 1 04 (H) 08/19/2018 0117   TROPONINI 1 12 (H) 08/18/2018 2200   TROPONINI 0 96 (H) 08/18/2018 1745           Diagnostic Imaging / Data: I have personally reviewed pertinent reports      EKG: Tachycardia  Code Status: Level 1 - Full Code    Portions of the record may have been created with voice recognition software  Occasional wrong word or "sound a like" substitutions may have occurred due to the inherent limitations of voice recognition software  Read the chart carefully and recognize, using context, where substitutions have occurred      SIGNATURE: Shahnaz Munoz PA-C  DATE: August 26, 2018  TIME: 10:57 PM

## 2018-08-27 NOTE — DEATH NOTE
INPATIENT DEATH NOTE  Juani Haider 70 y o  female MRN: [id number]  Unit/Bed#:  Encounter: [id number]    Date, Time and Cause of Death    Date of Death:  18  Time of Death:   1:35 AM  Preliminary Cause of Death:  Acute renal failure ([facility] [address] )  Entered by:  Antony Hill[KS1 1]     Attribution     KS1 1 Bettye Bailey PA-C 18 01:41           Patient's Information  Pronounced by: Ariel Edge PA-C  Did the patient's death occur in the ED?: No  Did the patient's death occur in the OR?: No  Did the patient's death occur less than 10 days post-op?: No  Did the patient's death occur within 24 hours of admission?: No  Was code status DNR at the time of death?: No    PHYSICAL EXAM:  Unresponsive to noxious stimuli, Spontaneous respirations absent, Breath sounds absent, Carotid pulse absent, Heart sounds absent, Pupillary light reflex absent and Corneal blink reflex absent    Medical Examiner notification criteria:  NONE APPLICABLE   Medical Examiner's office notified?:  Yes, was in restraints post first cardiac arrest with intubation  Medical Examiner accepted case?:  No  Name of Medical Examiner: [facility] coroners office    Family Notification  Was the family notified?: Yes  Date Notified: 18  Time Notified: 0100  Notified by: Ariel Edge  Name of Family Notified of Death: April   Relationship to Patient: Daughter  Family Notification Route: Telephone    Autopsy Options:  Decision for post-mortem examination not yet made by next of kin      Primary Service Attending Physician notified?:  yes - Attending:  Dr Maria Elena Victor on call [facility] Attending    Physician/Resident responsible for completing Discharge Summary:  Ariel Edge PA-C

## 2018-08-28 LAB — HAPTOGLOB SERPL-MCNC: 136 MG/DL (ref 34–200)

## 2018-08-29 LAB
METHYLMALONATE SERPL-SCNC: 264 NMOL/L (ref 0–378)
SL AMB DISCLAIMER: NORMAL
VWF CP ACT/NOR PPP CHRO: 41 %
VWF CP ACT/NOR PPP CHRO: NORMAL %

## 2018-08-30 LAB
ATRIAL RATE: 76 BPM
ATRIAL RATE: 91 BPM
P AXIS: 41 DEGREES
PR INTERVAL: 178 MS
QRS AXIS: -41 DEGREES
QRS AXIS: -49 DEGREES
QRSD INTERVAL: 134 MS
QRSD INTERVAL: 156 MS
QT INTERVAL: 394 MS
QT INTERVAL: 442 MS
QTC INTERVAL: 422 MS
QTC INTERVAL: 543 MS
T WAVE AXIS: 124 DEGREES
T WAVE AXIS: 132 DEGREES
VENTRICULAR RATE: 69 BPM
VENTRICULAR RATE: 91 BPM

## 2018-08-31 LAB — BACTERIA BLD CULT: NORMAL

## 2023-02-26 NOTE — CONSULTS
Consultation - [facility]  Himanshu Comment 70 y o  female MRN: [id number]  Unit/Bed#: -01 Encounter: [id number]        Consults    Reason for Consult / Principal Problem: elevated LFTs    ASSESSMENT and PLAN:    Principal Problem:    Encephalopathy  Active Problems:    History of recent fall    Supratherapeutic INR    SANTI (acute kidney injury) ([facility] [address] )    NSTEMI (non-ST elevated myocardial infarction) ([facility] )    Hypoxia    Iron deficiency anemia    Low bicarbonate level    Acute kidney injury ([place] [address] )    Essential hypertension    UTI (urinary tract infection)    Acute on chronic combined systolic (congestive) and diastolic (congestive) heart failure (HCC)    Volume overload    Atrial fibrillation (HCC)    Abnormal LFTs    Constipation    Acute respiratory failure with hypoxia ([place] [address] )    #1  Elevated LFTs: unclear what patient's baseline is  AST 40, , Alk phos 223, T bili 1 6  These have overall trended down since her admission on 8/18 at which time her ASt and ALT where in the 400's and her T bili was 1 8  Differential includes congestive hepatopathy in the setting of CHF, autoimmune hepatitis, viral hepatitis, medication induced, etc  Platelets normal  INR 2 52 in the setting of Eliquis use and SANTI  This was 5 upon admission  US of the liver showed a normal liver with small amount of ascites and a right pleural effusion    -Follow up viral hepatitis panel    -Check EUGENE, AMA, ASMA, ceruloplasmin, alpha-1 antitrypsin  -Will order a liver doppler to rule out Budd Chiari    -Would recommend continuing to treat CHF as LFTs have been improving with treatment  -Consider outpatient liver biopsy if LFTs not improving   -Avoid hepatotoxic meds  -Continue to monitor LFTs closely   Will add hepatic function panel for today  -------------------------------------------------------------------------------------------------------------------    HPI:  This is a 51-year-old female with a history of hypertension, diabetes, CHF, and atrial fibrillation on Eliquis who has been admitted for the last 6 days secondary to encephalopathy from UTI, acute kidney injury, and volume overload  The patient was noted to have elevated LFTs upon admission which have been improving since that time  It was thought this could be secondary to congestive hepatopathy  She had an ultrasound on the 18th which showed a normal liver and small amount of ascites with a right pleural effusion  Overall the patient is a poor historian and is unable to give any history  It is unclear what her baseline LFTs are as we do not have labs prior to this  We are waiting on labs to be sent over from the PCP and be scanned into the chart  It appears that her LFTs were elevated prior to the start of any antibiotic medication so it is unlikely that this is medication related from anything she received in the hospital     REVIEW OF SYSTEMS:  Unable to perform secondary to confusion      Historical Information   Past Medical History:   Diagnosis Date    Cardiac disease     Diabetes ([facility] [address] )     Hypertension      Past Surgical History:   Procedure Laterality Date    CORONARY ANGIOPLASTY WITH STENT PLACEMENT      IR PICC LINE  8/23/2018     Social History   History   Alcohol Use No     History   Drug Use No     History   Smoking Status    Former Smoker   Smokeless Tobacco    Never Used     History reviewed  No pertinent family history      Meds/Allergies     Prescriptions Prior to Admission   Medication    albuterol (ACCUNEB) 1 25 MG/3ML nebulizer solution    apixaban (ELIQUIS) 5 mg    furosemide (LASIX) 20 mg tablet    glipiZIDE (GLUCOTROL) 5 mg tablet    hydrALAZINE (APRESOLINE) 25 mg tablet    ketorolac (ACULAR) 0 5 % ophthalmic solution    ofloxacin (OCUFLOX) 0 3 % ophthalmic solution    prednisoLONE acetate (PRED FORTE) 1 % ophthalmic suspension    albuterol (PROVENTIL HFA,VENTOLIN HFA) 90 mcg/act inhaler     Current Facility-Administered Medications   Medication Dose Route Frequency    acetaminophen (TYLENOL) tablet 650 mg  650 mg Oral Q6H PRN    albuterol (PROVENTIL HFA,VENTOLIN HFA) inhaler 2 puff  2 puff Inhalation Q4H PRN    ampicillin (OMNIPEN) 2,000 mg in sodium chloride 0 9 % 100 mL IVPB  2,000 mg Intravenous Q6H    apixaban (ELIQUIS) tablet 5 mg  5 mg Oral BID    b complex-vitamin C-folic acid (NEPHROCAPS) capsule 1 capsule  1 capsule Oral Daily With Dinner    bisacodyl (DULCOLAX) rectal suppository 10 mg  10 mg Rectal Daily PRN    docusate sodium (COLACE) capsule 100 mg  100 mg Oral BID    furosemide (LASIX) tablet 20 mg  20 mg Oral BID (diuretic)    insulin lispro (HumaLOG) 100 units/mL subcutaneous injection 1-5 Units  1-5 Units Subcutaneous TID AC    insulin lispro (HumaLOG) 100 units/mL subcutaneous injection 1-5 Units  1-5 Units Subcutaneous HS    menthol-methyl salicylate (BENGAY) 76-10 % cream   Apply externally 4x Daily PRN    metoprolol tartrate (LOPRESSOR) partial tablet 12 5 mg  12 5 mg Oral Q12H RAD    ondansetron (ZOFRAN) injection 4 mg  4 mg Intravenous Q6H PRN    polyethylene glycol (MIRALAX) packet 17 g  17 g Oral Daily    prednisoLONE acetate (PRED FORTE) 1 % ophthalmic suspension 1 drop  1 drop Left Eye 4x Daily       No Known Allergies        Objective     Blood pressure 137/58, pulse (!) 109, temperature 97 5 °F (36 4 °C), temperature source Oral, resp  rate 20, height 5' 4" (1 626 m), weight 71 6 kg (157 lb 13 6 oz), SpO2 100 %        Intake/Output Summary (Last 24 hours) at 08/24/18 0942  Last data filed at 08/24/18 0427   Gross per 24 hour   Intake              537 ml   Output              600 ml   Net              -63 ml         PHYSICAL EXAM:      General Appearance:   Alert but confused, cooperative, no distress, appears stated age    HEENT:   Normocephalic, atraumatic, anicteric, no oropharyngeal thrush present      Neck:  Supple, symmetrical, trachea midline, no adenopathy;    thyroid: no enlargement/tenderness/nodules; no carotid  bruit or JVD    Lungs:   Clear to auscultation bilaterally; no rales, rhonchi or wheezing; respirations unlabored    Heart[de-identified]   S1 and S2 normal; regular rate; irregularly irregular rhythm present, no murmur, rub, or gallop  Abdomen:   Soft, non-tender, non-distended; normal bowel sounds; no masses, no organomegaly    Genitalia:   Deferred    Rectal:   Deferred    Extremities:  No cyanosis, clubbing; left upper extremity edema   Pulses:  2+ and symmetric all extremities    Skin:  Skin color, texture, turgor normal, no rashes or lesions    Lymph nodes:  No palpable cervical, axillary or inguinal lymphadenopathy        Lab Results:     Results from last 7 days  Lab Units 08/23/18  1313 08/22/18  1323   WBC Thousand/uL 10 39* 9 21   HEMOGLOBIN g/dL 8 2* 7 8*   HEMATOCRIT % 28 6* 27 9*   PLATELETS Thousands/uL 154 149   NEUTROS PCT %  --  78*   LYMPHS PCT %  --  13*   MONOS PCT %  --  8   EOS PCT %  --  0       Results from last 7 days  Lab Units 08/24/18  0622 08/23/18  1313   SODIUM mmol/L 145 145   POTASSIUM mmol/L 3 7 3 8   CHLORIDE mmol/L 110* 110*   CO2 mmol/L 28 25   BUN mg/dL 56* 48*   CREATININE mg/dL 2 79* 2 34*   CALCIUM mg/dL 8 1* 8 3   TOTAL PROTEIN g/dL  --  5 7*   BILIRUBIN TOTAL mg/dL  --  1 60*   ALK PHOS U/L  --  223*   ALT U/L  --  167*   AST U/L  --  40   GLUCOSE RANDOM mg/dL 139 139       Results from last 7 days  Lab Units 08/24/18  0622   INR  2 52*           Imaging Studies: I have personally reviewed pertinent imaging studies  Xr Chest 1 View Portable    Result Date: 8/19/2018  Impression: Cardiomegaly  Left-sided pleural effusion    Workstation performed: [id number]     WB Ankle 3+ Vw Left    Result Date: 8/20/2018  Impression: No acute osseous abnormality  Workstation performed: [id number]     Xr Ankle 3+ Vw Right    Result Date: 8/20/2018  Impression: No acute osseous abnormality   Workstation performed: [id number]     Ct Head Wo Contrast    Result Date: 8/18/2018  Impression: No acute intracranial abnormality  Microangiopathic changes  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  Workstation performed: [id number]     Us Abdomen Complete    Result Date: 8/19/2018  Impression: Unremarkable liver  No biliary obstruction  Small atrophic right kidney  Small ascites  Right pleural effusion  Workstation performed: [id number]     Ir Picc Line    Result Date: 8/23/2018  Impression: 1  Status post placement of a 5-Maced[place] central venous catheter via the right brachial vein with its tip at the cavoatrial junction under ultrasound and fluoroscopic guidance  Workstation performed: [id number]           Patient was seen and examined by Dr Emily Santos  All jessica medical decisions were made by Dr Emily Santos  Thank you for allowing us to participate in the care of this present patient  We will follow-up with you closely  94
